# Patient Record
Sex: FEMALE | Race: WHITE | NOT HISPANIC OR LATINO | Employment: OTHER | URBAN - METROPOLITAN AREA
[De-identification: names, ages, dates, MRNs, and addresses within clinical notes are randomized per-mention and may not be internally consistent; named-entity substitution may affect disease eponyms.]

---

## 2019-01-09 DIAGNOSIS — Z00.6 EXAMINATION FOR NORMAL COMPARISON FOR CLINICAL RESEARCH: Primary | ICD-10-CM

## 2019-01-18 ENCOUNTER — TELEPHONE (OUTPATIENT)
Dept: CARDIAC SURGERY | Facility: CLINIC | Age: 59
End: 2019-01-18

## 2019-01-18 NOTE — TELEPHONE ENCOUNTER
New patient scheduled to see Dr. Quinn on 1/10/19 for carotid stenosis. Patient no showed to this appointment. Patient records were placed on Dr. Quinn's desk for review. He stated that he would like to see the patient back on 2/4/19 at his [place] clinic. However, patient has transportation issues and wishes to see Dr. Quinn when he is at his [place] clinic. Appointment made for [place] on 3/14/19.

## 2019-03-14 ENCOUNTER — OFFICE VISIT (OUTPATIENT)
Dept: CARDIAC SURGERY | Facility: CLINIC | Age: 59
End: 2019-03-14

## 2019-03-14 VITALS
WEIGHT: 115 LBS | HEIGHT: 63 IN | BODY MASS INDEX: 20.38 KG/M2 | OXYGEN SATURATION: 99 % | SYSTOLIC BLOOD PRESSURE: 174 MMHG | HEART RATE: 104 BPM | DIASTOLIC BLOOD PRESSURE: 85 MMHG

## 2019-03-14 DIAGNOSIS — I65.22 STENOSIS OF LEFT CAROTID ARTERY: Primary | ICD-10-CM

## 2019-03-14 PROCEDURE — 99205 OFFICE O/P NEW HI 60 MIN: CPT | Performed by: THORACIC SURGERY (CARDIOTHORACIC VASCULAR SURGERY)

## 2019-03-14 RX ORDER — AMLODIPINE BESYLATE 10 MG/1
10 TABLET ORAL EVERY MORNING
Refills: 3 | COMMUNITY
Start: 2019-01-31

## 2019-03-14 RX ORDER — ISOSORBIDE MONONITRATE 30 MG/1
30 TABLET, EXTENDED RELEASE ORAL DAILY
COMMUNITY
End: 2019-04-01

## 2019-03-14 RX ORDER — CLOPIDOGREL BISULFATE 75 MG/1
75 TABLET ORAL EVERY MORNING
Refills: 3 | Status: ON HOLD | COMMUNITY
Start: 2019-01-31 | End: 2022-05-23

## 2019-03-14 RX ORDER — ASPIRIN 81 MG/1
81 TABLET ORAL DAILY
Refills: 3 | COMMUNITY
Start: 2019-02-25

## 2019-03-14 RX ORDER — GABAPENTIN 300 MG/1
300 CAPSULE ORAL 2 TIMES DAILY
Refills: 3 | COMMUNITY
Start: 2019-02-15 | End: 2022-03-03

## 2019-03-14 RX ORDER — ROSUVASTATIN CALCIUM 20 MG/1
20 TABLET, COATED ORAL EVERY MORNING
Refills: 3 | COMMUNITY
Start: 2019-02-25

## 2019-03-14 RX ORDER — FAMOTIDINE 20 MG/1
20 TABLET, FILM COATED ORAL DAILY
Refills: 2 | COMMUNITY
Start: 2019-02-27 | End: 2022-03-03

## 2019-03-14 NOTE — PROGRESS NOTES
"03/14/2019  Patient Information  Shannon Day                                                                                          [address]   [place] [address]   1960  'PCP/Referring Physician'  Provider, No Known  None  No ref. provider found    Chief Complaint   Patient presents with   • Consult     Np referred for carotid artery stenosis.   • Carotid Artery Disease       History of Present Illness:  This is a patient who has a long smoking history beginning at age 12 years.  She has smoked up to two packs a day and continues to smoke.  She has had what she states is a \"previous heart attack\" and she says that she has had a heart catheterization in the last two yeas and she was actually referred to me by her cardiologist with a CTA demonstrating 75% left-sided carotid stenosis.  These are also very small arteries visualized on the scan.  She denies strokes, seizures, TIAs, amaurosis fugax, dysarthria or dysphagia.      Patient Active Problem List   Diagnosis   • Stenosis of left carotid artery     Past Medical History:   Diagnosis Date   • Anxiety    • Arthritis    • Coronary artery disease    • Depression    • Dyslipidemia    • GERD (gastroesophageal reflux disease)    • Hypertension    • Myocardial infarction (CMS/HCC)      Past Surgical History:   Procedure Laterality Date   • CORONARY STENT PLACEMENT         Current Outpatient Medications:   •  amLODIPine (NORVASC) 10 MG tablet, Take 10 mg by mouth Daily., Disp: , Rfl: 3  •  aspirin 81 MG EC tablet, Take 81 mg by mouth Daily., Disp: , Rfl: 3  •  clopidogrel (PLAVIX) 75 MG tablet, Take 75 mg by mouth Daily., Disp: , Rfl: 3  •  famotidine (PEPCID) 20 MG tablet, Take 20 mg by mouth Daily., Disp: , Rfl: 2  •  gabapentin (NEURONTIN) 300 MG capsule, Take 300 mg by mouth 2 (Two) Times a Day., Disp: , Rfl: 3  •  isosorbide mononitrate (IMDUR) 30 MG 24 hr tablet, Take 30 mg by mouth Daily., Disp: , Rfl:   •  rosuvastatin (CRESTOR) 20 MG tablet, " Take 20 mg by mouth Daily., Disp: , Rfl: 3  Allergies   Allergen Reactions   • Percocet [Oxycodone-Acetaminophen] Nausea And Vomiting     Social History     Socioeconomic History   • Marital status: Single     Spouse name: Not on file   • Number of children: 0   • Years of education: Not on file   • Highest education level: Not on file   Social Needs   • Financial resource strain: Not on file   • Food insecurity - worry: Not on file   • Food insecurity - inability: Not on file   • Transportation needs - medical: Not on file   • Transportation needs - non-medical: Not on file   Occupational History   • Occupation: unemployed      Comment: applied for disability   Tobacco Use   • Smoking status: Current Every Day Smoker     Packs/day: 1.00     Years: 46.00     Pack years: 46.00     Types: Cigarettes   • Smokeless tobacco: Never Used   • Tobacco comment: started smoking at age 12   Substance and Sexual Activity   • Alcohol use: Yes     Comment: 1-2 beers daily   • Drug use: No   • Sexual activity: Not on file   Other Topics Concern   • Not on file   Social History Narrative    Lives in [facility]     Family History   Problem Relation Age of Onset   • Other Mother         brain tumor   • Alcohol abuse Father    • Cirrhosis Father      Review of Systems   Constitution: Positive for diaphoresis. Negative for chills, fever, malaise/fatigue, night sweats and weight loss.   HENT: Positive for nosebleeds. Negative for hearing loss, odynophagia and sore throat.    Cardiovascular: Negative for chest pain, dyspnea on exertion, leg swelling, orthopnea and palpitations.   Respiratory: Positive for cough. Negative for hemoptysis.    Endocrine: Negative for cold intolerance, heat intolerance, polydipsia, polyphagia and polyuria.   Hematologic/Lymphatic: Bruises/bleeds easily.   Skin: Negative for itching and rash.   Musculoskeletal: Positive for back pain and falls. Negative for joint pain, joint swelling and  "myalgias.   Gastrointestinal: Negative for abdominal pain, constipation, diarrhea, hematemesis, hematochezia, melena, nausea and vomiting.   Genitourinary: Negative for dysuria, frequency and hematuria.   Neurological: Positive for loss of balance. Negative for focal weakness, headaches, numbness and seizures.   Psychiatric/Behavioral: Positive for depression. Negative for suicidal ideas. The patient is nervous/anxious.    All other systems reviewed and are negative.    Vitals:    03/14/19 1303   BP: 174/85   BP Location: Left arm   Patient Position: Sitting   Pulse: 104   SpO2: 99%   Weight: 52.2 kg (115 lb)   Height: 160 cm (63\")      Physical Exam  CONSTITUTIONAL:  Alert and conversant, in no acute distress.  EYES:    Eyes anicteric.  EOMI.  PERRLA.  ENT:    No nasal deviation.  Trachea is midline.  NECK:    No masses or JVD.  LUNGS:   Decreased in the bases; no wheezing, rales or rhonchi.  CARDIAC:        Regular rate and rhythm without murmur or rub.  No carotid bruits.  GI:      The abdomen is soft, nontender, nondistended with normal bowel sounds.  No hepatosplenomegaly and no masses.  EXTREMITIES:    No cyanosis, clubbing or edema.  SKIN:    No ulcers.  NEURO:   No focal motor or sensory deficits.  PSYCH:   Alert and oriented x3; mood and affect good.    Labs/Imaging:  I have reviewed the CT scan demonstrating 75% left carotid stenosis and very small arteries.    Assessment/Plan:  This patient has a very long smoking history.  She was referred by her cardiologist for a 75% left carotid stenosis.  I have recommended carotid endarterectomy.  I have discussed the risks of stroke, bleeding, infection, death, renal failure and also explained the preoperative testing process.  She agrees to proceed.  She has no telephone and no car for transportation.  This will make scheduling arrangements problematic.  She will phone our office within the next week to try to arrange a time for surgery and the necessary preop " testing.  We will proceed with left carotid endarterectomy.    Patient Active Problem List   Diagnosis   • Stenosis of left carotid artery     CC: MD Vciky Navarro transcribing on behalf of Dallin Quinn MD

## 2019-03-27 ENCOUNTER — PREP FOR SURGERY (OUTPATIENT)
Dept: OTHER | Facility: HOSPITAL | Age: 59
End: 2019-03-27

## 2019-03-27 DIAGNOSIS — I65.22 CAROTID STENOSIS, LEFT: Primary | ICD-10-CM

## 2019-03-27 DIAGNOSIS — I65.22 STENOSIS OF LEFT CAROTID ARTERY: Primary | ICD-10-CM

## 2019-03-27 RX ORDER — CHLORHEXIDINE GLUCONATE 500 MG/1
1 CLOTH TOPICAL EVERY 12 HOURS PRN
Status: CANCELLED | OUTPATIENT
Start: 2019-03-27

## 2019-04-01 ENCOUNTER — APPOINTMENT (OUTPATIENT)
Dept: PREADMISSION TESTING | Facility: HOSPITAL | Age: 59
End: 2019-04-01

## 2019-04-01 ENCOUNTER — ANESTHESIA EVENT (OUTPATIENT)
Dept: PERIOP | Facility: HOSPITAL | Age: 59
End: 2019-04-01

## 2019-04-01 ENCOUNTER — HOSPITAL ENCOUNTER (OUTPATIENT)
Dept: GENERAL RADIOLOGY | Facility: HOSPITAL | Age: 59
Discharge: HOME | End: 2019-04-01
Admitting: PHYSICIAN ASSISTANT

## 2019-04-01 VITALS — WEIGHT: 116.84 LBS | HEIGHT: 63 IN | BODY MASS INDEX: 20.7 KG/M2

## 2019-04-01 DIAGNOSIS — I65.22 CAROTID STENOSIS, LEFT: ICD-10-CM

## 2019-04-01 LAB
ABO GROUP BLD: NORMAL
ANION GAP SERPL CALCULATED.3IONS-SCNC: 7 MMOL/L (ref 3–11)
BLD GP AB SCN SERPL QL: NEGATIVE
BUN BLD-MCNC: 9 MG/DL (ref 9–23)
BUN/CREAT SERPL: 12.2 (ref 7–25)
CALCIUM SPEC-SCNC: 10.2 MG/DL (ref 8.7–10.4)
CHLORIDE SERPL-SCNC: 103 MMOL/L (ref 99–109)
CO2 SERPL-SCNC: 24 MMOL/L (ref 20–31)
CREAT BLD-MCNC: 0.74 MG/DL (ref 0.6–1.3)
DEPRECATED RDW RBC AUTO: 45.1 FL (ref 37–54)
ERYTHROCYTE [DISTWIDTH] IN BLOOD BY AUTOMATED COUNT: 13.3 % (ref 11.3–14.5)
GFR SERPL CREATININE-BSD FRML MDRD: 81 ML/MIN/1.73
GLUCOSE BLD-MCNC: 98 MG/DL (ref 70–100)
HBA1C MFR BLD: 5.2 % (ref 4.8–5.6)
HCT VFR BLD AUTO: 42.6 % (ref 34.5–44)
HGB BLD-MCNC: 14 G/DL (ref 11.5–15.5)
INR PPP: 0.95 (ref 0.85–1.16)
MCH RBC QN AUTO: 30.6 PG (ref 27–31)
MCHC RBC AUTO-ENTMCNC: 32.9 G/DL (ref 32–36)
MCV RBC AUTO: 93 FL (ref 80–99)
PLATELET # BLD AUTO: 292 10*3/MM3 (ref 150–450)
PMV BLD AUTO: 10.1 FL (ref 6–12)
POTASSIUM BLD-SCNC: 4.1 MMOL/L (ref 3.5–5.5)
PROTHROMBIN TIME: 12.2 SECONDS (ref 11.2–14.3)
RBC # BLD AUTO: 4.58 10*6/MM3 (ref 3.89–5.14)
RH BLD: POSITIVE
SODIUM BLD-SCNC: 134 MMOL/L (ref 132–146)
T&S EXPIRATION DATE: NORMAL
WBC NRBC COR # BLD: 11.37 10*3/MM3 (ref 3.5–10.8)

## 2019-04-01 PROCEDURE — 80048 BASIC METABOLIC PNL TOTAL CA: CPT | Performed by: PHYSICIAN ASSISTANT

## 2019-04-01 PROCEDURE — 83036 HEMOGLOBIN GLYCOSYLATED A1C: CPT | Performed by: PHYSICIAN ASSISTANT

## 2019-04-01 PROCEDURE — 71046 X-RAY EXAM CHEST 2 VIEWS: CPT

## 2019-04-01 PROCEDURE — 93005 ELECTROCARDIOGRAM TRACING: CPT

## 2019-04-01 PROCEDURE — 86901 BLOOD TYPING SEROLOGIC RH(D): CPT | Performed by: PHYSICIAN ASSISTANT

## 2019-04-01 PROCEDURE — 86850 RBC ANTIBODY SCREEN: CPT | Performed by: PHYSICIAN ASSISTANT

## 2019-04-01 PROCEDURE — 36415 COLL VENOUS BLD VENIPUNCTURE: CPT

## 2019-04-01 PROCEDURE — 86900 BLOOD TYPING SEROLOGIC ABO: CPT | Performed by: PHYSICIAN ASSISTANT

## 2019-04-01 PROCEDURE — 85027 COMPLETE CBC AUTOMATED: CPT | Performed by: PHYSICIAN ASSISTANT

## 2019-04-01 PROCEDURE — 85610 PROTHROMBIN TIME: CPT | Performed by: PHYSICIAN ASSISTANT

## 2019-04-01 RX ORDER — FAMOTIDINE 10 MG/ML
20 INJECTION, SOLUTION INTRAVENOUS ONCE
Status: CANCELLED | OUTPATIENT
Start: 2019-04-01 | End: 2019-04-01

## 2019-04-01 NOTE — PAT
EKG IN CHART-PATIENT SEES DR. DELAROSA IN [place].      Patient to apply Chlorhexadine wipes  to surgical area (as instructed) the night before procedure and the AM of procedure. Wipes provided.    Per Anesthesia Request, patient instructed not to take their ACE/ARB medications on the AM of surgery.

## 2019-04-02 ENCOUNTER — APPOINTMENT (OUTPATIENT)
Dept: NEUROLOGY | Facility: HOSPITAL | Age: 59
End: 2019-04-02

## 2019-04-02 ENCOUNTER — ANESTHESIA (OUTPATIENT)
Dept: PERIOP | Facility: HOSPITAL | Age: 59
End: 2019-04-02

## 2019-04-02 ENCOUNTER — HOSPITAL ENCOUNTER (INPATIENT)
Facility: HOSPITAL | Age: 59
LOS: 2 days | Discharge: HOME | End: 2019-04-04
Attending: THORACIC SURGERY (CARDIOTHORACIC VASCULAR SURGERY) | Admitting: THORACIC SURGERY (CARDIOTHORACIC VASCULAR SURGERY)

## 2019-04-02 DIAGNOSIS — I65.22 STENOSIS OF LEFT CAROTID ARTERY: ICD-10-CM

## 2019-04-02 DIAGNOSIS — I65.22 CAROTID STENOSIS, LEFT: ICD-10-CM

## 2019-04-02 PROBLEM — I10 ESSENTIAL HYPERTENSION: Status: ACTIVE | Noted: 2019-04-02

## 2019-04-02 PROBLEM — E78.5 DYSLIPIDEMIA: Status: ACTIVE | Noted: 2019-04-02

## 2019-04-02 PROBLEM — J41.1 MUCOPURULENT CHRONIC BRONCHITIS (HCC): Status: ACTIVE | Noted: 2019-04-02

## 2019-04-02 PROBLEM — I25.10 CORONARY ARTERY DISEASE INVOLVING NATIVE CORONARY ARTERY OF NATIVE HEART WITHOUT ANGINA PECTORIS: Status: ACTIVE | Noted: 2019-04-02

## 2019-04-02 LAB
ABO GROUP BLD: NORMAL
RH BLD: POSITIVE

## 2019-04-02 PROCEDURE — 86900 BLOOD TYPING SEROLOGIC ABO: CPT

## 2019-04-02 PROCEDURE — 25010000002 PROPOFOL 10 MG/ML EMULSION: Performed by: NURSE ANESTHETIST, CERTIFIED REGISTERED

## 2019-04-02 PROCEDURE — 88304 TISSUE EXAM BY PATHOLOGIST: CPT | Performed by: THORACIC SURGERY (CARDIOTHORACIC VASCULAR SURGERY)

## 2019-04-02 PROCEDURE — 03UL0KZ SUPPLEMENT LEFT INTERNAL CAROTID ARTERY WITH NONAUTOLOGOUS TISSUE SUBSTITUTE, OPEN APPROACH: ICD-10-PCS | Performed by: THORACIC SURGERY (CARDIOTHORACIC VASCULAR SURGERY)

## 2019-04-02 PROCEDURE — C1768 GRAFT, VASCULAR: HCPCS | Performed by: THORACIC SURGERY (CARDIOTHORACIC VASCULAR SURGERY)

## 2019-04-02 PROCEDURE — 03CL0ZZ EXTIRPATION OF MATTER FROM LEFT INTERNAL CAROTID ARTERY, OPEN APPROACH: ICD-10-PCS | Performed by: THORACIC SURGERY (CARDIOTHORACIC VASCULAR SURGERY)

## 2019-04-02 PROCEDURE — 25010000002 PHENYLEPHRINE PER 1 ML: Performed by: NURSE ANESTHETIST, CERTIFIED REGISTERED

## 2019-04-02 PROCEDURE — 25010000002 HEPARIN (PORCINE) PER 1000 UNITS: Performed by: THORACIC SURGERY (CARDIOTHORACIC VASCULAR SURGERY)

## 2019-04-02 PROCEDURE — 25010000002 DEXAMETHASONE PER 1 MG: Performed by: NURSE ANESTHETIST, CERTIFIED REGISTERED

## 2019-04-02 PROCEDURE — 25010000002 CEFAZOLIN PER 500 MG: Performed by: THORACIC SURGERY (CARDIOTHORACIC VASCULAR SURGERY)

## 2019-04-02 PROCEDURE — 94799 UNLISTED PULMONARY SVC/PX: CPT

## 2019-04-02 PROCEDURE — 88311 DECALCIFY TISSUE: CPT | Performed by: THORACIC SURGERY (CARDIOTHORACIC VASCULAR SURGERY)

## 2019-04-02 PROCEDURE — 25010000002 PROTAMINE SULFATE PER 10 MG: Performed by: NURSE ANESTHETIST, CERTIFIED REGISTERED

## 2019-04-02 PROCEDURE — 94640 AIRWAY INHALATION TREATMENT: CPT

## 2019-04-02 PROCEDURE — 95955 EEG DURING SURGERY: CPT

## 2019-04-02 PROCEDURE — 25010000002 HEPARIN (PORCINE) PER 1000 UNITS: Performed by: NURSE ANESTHETIST, CERTIFIED REGISTERED

## 2019-04-02 PROCEDURE — 25010000002 FENTANYL CITRATE (PF) 100 MCG/2ML SOLUTION: Performed by: NURSE ANESTHETIST, CERTIFIED REGISTERED

## 2019-04-02 PROCEDURE — 25010000002 ONDANSETRON PER 1 MG: Performed by: NURSE ANESTHETIST, CERTIFIED REGISTERED

## 2019-04-02 PROCEDURE — 25010000003 CEFAZOLIN IN DEXTROSE 2-4 GM/100ML-% SOLUTION: Performed by: THORACIC SURGERY (CARDIOTHORACIC VASCULAR SURGERY)

## 2019-04-02 PROCEDURE — 86901 BLOOD TYPING SEROLOGIC RH(D): CPT

## 2019-04-02 PROCEDURE — 25010000002 NEOSTIGMINE 10 MG/10ML SOLUTION: Performed by: NURSE ANESTHETIST, CERTIFIED REGISTERED

## 2019-04-02 PROCEDURE — 99233 SBSQ HOSP IP/OBS HIGH 50: CPT | Performed by: INTERNAL MEDICINE

## 2019-04-02 PROCEDURE — 95816 EEG AWAKE AND DROWSY: CPT

## 2019-04-02 PROCEDURE — 35301 RECHANNELING OF ARTERY: CPT | Performed by: THORACIC SURGERY (CARDIOTHORACIC VASCULAR SURGERY)

## 2019-04-02 DEVICE — VASCU-GUARD PERIPHERAL VASCULAR PATCH IS PREPARED FROM BOVINE PERICARDIUM WHICH IS CROSS-LINKED WITH GLUTARALDEHYDE. THE PERICARDIUM IS PROCURED FROM CATTLE ORIGINATING IN THE UNITED STATES. VASCU-GUARD PERIPHERAL VASCULAR PATCH IS CHEMICALLY STERILIZED USING ETHANOL AND PROPYLENE OXIDE. VASCU-GUARD PERIPHERAL VASCULAR PATCH HAS BEEN TREATED WITH 1 MOLAR SODIUM HYDROXIDE FOR A MINIMUM OF 60 MINUTES AT 20 - 25 C.  VASCU-GUARD PERIPHERAL VASCULAR PATCH IS PACKAGED IN A CONTAINER FILLED WITH STERILE, NON-PYROGENIC WATER CONTAINING PROPYLENE OXIDE. THE CONTENTS OF THE UNOPENED, UNDAMAGED CONTAINER ARE STERILE.
Type: IMPLANTABLE DEVICE | Site: CAROTID | Status: FUNCTIONAL
Brand: VASCU-GUARD PERIPHERAL VASCULAR PATCH

## 2019-04-02 RX ORDER — AMLODIPINE BESYLATE 10 MG/1
10 TABLET ORAL DAILY
Status: DISCONTINUED | OUTPATIENT
Start: 2019-04-03 | End: 2019-04-04 | Stop reason: HOSPADM

## 2019-04-02 RX ORDER — SODIUM CHLORIDE 0.9 % (FLUSH) 0.9 %
3-10 SYRINGE (ML) INJECTION AS NEEDED
Status: DISCONTINUED | OUTPATIENT
Start: 2019-04-02 | End: 2019-04-02 | Stop reason: HOSPADM

## 2019-04-02 RX ORDER — FENTANYL CITRATE 50 UG/ML
50 INJECTION, SOLUTION INTRAMUSCULAR; INTRAVENOUS
Status: DISCONTINUED | OUTPATIENT
Start: 2019-04-02 | End: 2019-04-02 | Stop reason: HOSPADM

## 2019-04-02 RX ORDER — CEFAZOLIN SODIUM 2 G/100ML
2 INJECTION, SOLUTION INTRAVENOUS EVERY 8 HOURS
Status: COMPLETED | OUTPATIENT
Start: 2019-04-02 | End: 2019-04-03

## 2019-04-02 RX ORDER — NEOSTIGMINE METHYLSULFATE 1 MG/ML
INJECTION, SOLUTION INTRAVENOUS AS NEEDED
Status: DISCONTINUED | OUTPATIENT
Start: 2019-04-02 | End: 2019-04-02 | Stop reason: SURG

## 2019-04-02 RX ORDER — GABAPENTIN 300 MG/1
300 CAPSULE ORAL EVERY 12 HOURS SCHEDULED
Status: DISCONTINUED | OUTPATIENT
Start: 2019-04-02 | End: 2019-04-04 | Stop reason: HOSPADM

## 2019-04-02 RX ORDER — MAGNESIUM HYDROXIDE 1200 MG/15ML
LIQUID ORAL AS NEEDED
Status: DISCONTINUED | OUTPATIENT
Start: 2019-04-02 | End: 2019-04-02 | Stop reason: HOSPADM

## 2019-04-02 RX ORDER — GLYCOPYRROLATE 0.2 MG/ML
INJECTION INTRAMUSCULAR; INTRAVENOUS AS NEEDED
Status: DISCONTINUED | OUTPATIENT
Start: 2019-04-02 | End: 2019-04-02 | Stop reason: SURG

## 2019-04-02 RX ORDER — PROMETHAZINE HYDROCHLORIDE 25 MG/ML
6.25 INJECTION, SOLUTION INTRAMUSCULAR; INTRAVENOUS ONCE AS NEEDED
Status: DISCONTINUED | OUTPATIENT
Start: 2019-04-02 | End: 2019-04-02 | Stop reason: HOSPADM

## 2019-04-02 RX ORDER — BUDESONIDE AND FORMOTEROL FUMARATE DIHYDRATE 160; 4.5 UG/1; UG/1
2 AEROSOL RESPIRATORY (INHALATION)
Status: DISCONTINUED | OUTPATIENT
Start: 2019-04-02 | End: 2019-04-04 | Stop reason: HOSPADM

## 2019-04-02 RX ORDER — ONDANSETRON 2 MG/ML
4 INJECTION INTRAMUSCULAR; INTRAVENOUS ONCE AS NEEDED
Status: DISCONTINUED | OUTPATIENT
Start: 2019-04-02 | End: 2019-04-02 | Stop reason: HOSPADM

## 2019-04-02 RX ORDER — PROPOFOL 10 MG/ML
VIAL (ML) INTRAVENOUS AS NEEDED
Status: DISCONTINUED | OUTPATIENT
Start: 2019-04-02 | End: 2019-04-02 | Stop reason: SURG

## 2019-04-02 RX ORDER — MORPHINE SULFATE 4 MG/ML
2 INJECTION, SOLUTION INTRAMUSCULAR; INTRAVENOUS EVERY 4 HOURS PRN
Status: DISCONTINUED | OUTPATIENT
Start: 2019-04-02 | End: 2019-04-04 | Stop reason: HOSPADM

## 2019-04-02 RX ORDER — ESMOLOL HYDROCHLORIDE 10 MG/ML
INJECTION INTRAVENOUS AS NEEDED
Status: DISCONTINUED | OUTPATIENT
Start: 2019-04-02 | End: 2019-04-02 | Stop reason: SURG

## 2019-04-02 RX ORDER — HEPARIN SODIUM 1000 [USP'U]/ML
INJECTION, SOLUTION INTRAVENOUS; SUBCUTANEOUS AS NEEDED
Status: DISCONTINUED | OUTPATIENT
Start: 2019-04-02 | End: 2019-04-02 | Stop reason: SURG

## 2019-04-02 RX ORDER — FAMOTIDINE 20 MG/1
20 TABLET, FILM COATED ORAL 2 TIMES DAILY
Status: DISCONTINUED | OUTPATIENT
Start: 2019-04-02 | End: 2019-04-04 | Stop reason: HOSPADM

## 2019-04-02 RX ORDER — LIDOCAINE HYDROCHLORIDE 10 MG/ML
INJECTION, SOLUTION EPIDURAL; INFILTRATION; INTRACAUDAL; PERINEURAL AS NEEDED
Status: DISCONTINUED | OUTPATIENT
Start: 2019-04-02 | End: 2019-04-02 | Stop reason: SURG

## 2019-04-02 RX ORDER — CEFAZOLIN SODIUM 2 G/100ML
2 INJECTION, SOLUTION INTRAVENOUS ONCE
Status: COMPLETED | OUTPATIENT
Start: 2019-04-02 | End: 2019-04-02

## 2019-04-02 RX ORDER — HYDRALAZINE HYDROCHLORIDE 20 MG/ML
5 INJECTION INTRAMUSCULAR; INTRAVENOUS
Status: DISCONTINUED | OUTPATIENT
Start: 2019-04-02 | End: 2019-04-02 | Stop reason: HOSPADM

## 2019-04-02 RX ORDER — SODIUM CHLORIDE 0.9 % (FLUSH) 0.9 %
3 SYRINGE (ML) INJECTION EVERY 12 HOURS SCHEDULED
Status: DISCONTINUED | OUTPATIENT
Start: 2019-04-02 | End: 2019-04-04 | Stop reason: HOSPADM

## 2019-04-02 RX ORDER — CLOPIDOGREL BISULFATE 75 MG/1
75 TABLET ORAL DAILY
Status: DISCONTINUED | OUTPATIENT
Start: 2019-04-03 | End: 2019-04-04 | Stop reason: HOSPADM

## 2019-04-02 RX ORDER — SODIUM CHLORIDE 0.9 % (FLUSH) 0.9 %
1-10 SYRINGE (ML) INJECTION AS NEEDED
Status: DISCONTINUED | OUTPATIENT
Start: 2019-04-02 | End: 2019-04-02 | Stop reason: HOSPADM

## 2019-04-02 RX ORDER — SODIUM CHLORIDE 0.9 % (FLUSH) 0.9 %
3 SYRINGE (ML) INJECTION EVERY 12 HOURS SCHEDULED
Status: DISCONTINUED | OUTPATIENT
Start: 2019-04-02 | End: 2019-04-02 | Stop reason: HOSPADM

## 2019-04-02 RX ORDER — NALOXONE HCL 0.4 MG/ML
0.4 VIAL (ML) INJECTION AS NEEDED
Status: DISCONTINUED | OUTPATIENT
Start: 2019-04-02 | End: 2019-04-02 | Stop reason: HOSPADM

## 2019-04-02 RX ORDER — PROTAMINE SULFATE 10 MG/ML
INJECTION, SOLUTION INTRAVENOUS AS NEEDED
Status: DISCONTINUED | OUTPATIENT
Start: 2019-04-02 | End: 2019-04-02 | Stop reason: SURG

## 2019-04-02 RX ORDER — FAMOTIDINE 20 MG/1
20 TABLET, FILM COATED ORAL DAILY
Status: DISCONTINUED | OUTPATIENT
Start: 2019-04-03 | End: 2019-04-02

## 2019-04-02 RX ORDER — IPRATROPIUM BROMIDE AND ALBUTEROL SULFATE 2.5; .5 MG/3ML; MG/3ML
3 SOLUTION RESPIRATORY (INHALATION) ONCE AS NEEDED
Status: DISCONTINUED | OUTPATIENT
Start: 2019-04-02 | End: 2019-04-02 | Stop reason: HOSPADM

## 2019-04-02 RX ORDER — NICOTINE 21 MG/24HR
1 PATCH, TRANSDERMAL 24 HOURS TRANSDERMAL
Status: DISCONTINUED | OUTPATIENT
Start: 2019-04-02 | End: 2019-04-04 | Stop reason: HOSPADM

## 2019-04-02 RX ORDER — LIDOCAINE HYDROCHLORIDE 10 MG/ML
0.5 INJECTION, SOLUTION EPIDURAL; INFILTRATION; INTRACAUDAL; PERINEURAL ONCE AS NEEDED
Status: COMPLETED | OUTPATIENT
Start: 2019-04-02 | End: 2019-04-02

## 2019-04-02 RX ORDER — SODIUM CHLORIDE, SODIUM LACTATE, POTASSIUM CHLORIDE, CALCIUM CHLORIDE 600; 310; 30; 20 MG/100ML; MG/100ML; MG/100ML; MG/100ML
INJECTION, SOLUTION INTRAVENOUS CONTINUOUS PRN
Status: DISCONTINUED | OUTPATIENT
Start: 2019-04-02 | End: 2019-04-02 | Stop reason: SURG

## 2019-04-02 RX ORDER — CHLORHEXIDINE GLUCONATE 500 MG/1
1 CLOTH TOPICAL EVERY 12 HOURS PRN
Status: DISCONTINUED | OUTPATIENT
Start: 2019-04-02 | End: 2019-04-04 | Stop reason: HOSPADM

## 2019-04-02 RX ORDER — LIDOCAINE HYDROCHLORIDE 10 MG/ML
INJECTION, SOLUTION INFILTRATION; PERINEURAL AS NEEDED
Status: DISCONTINUED | OUTPATIENT
Start: 2019-04-02 | End: 2019-04-02 | Stop reason: HOSPADM

## 2019-04-02 RX ORDER — MEPERIDINE HYDROCHLORIDE 25 MG/ML
12.5 INJECTION INTRAMUSCULAR; INTRAVENOUS; SUBCUTANEOUS
Status: DISCONTINUED | OUTPATIENT
Start: 2019-04-02 | End: 2019-04-02 | Stop reason: HOSPADM

## 2019-04-02 RX ORDER — PROMETHAZINE HYDROCHLORIDE 25 MG/1
25 SUPPOSITORY RECTAL ONCE AS NEEDED
Status: DISCONTINUED | OUTPATIENT
Start: 2019-04-02 | End: 2019-04-02 | Stop reason: HOSPADM

## 2019-04-02 RX ORDER — IPRATROPIUM BROMIDE AND ALBUTEROL SULFATE 2.5; .5 MG/3ML; MG/3ML
3 SOLUTION RESPIRATORY (INHALATION)
Status: DISCONTINUED | OUTPATIENT
Start: 2019-04-02 | End: 2019-04-04 | Stop reason: HOSPADM

## 2019-04-02 RX ORDER — ASPIRIN 81 MG/1
81 TABLET ORAL DAILY
Status: DISCONTINUED | OUTPATIENT
Start: 2019-04-03 | End: 2019-04-04 | Stop reason: HOSPADM

## 2019-04-02 RX ORDER — SODIUM CHLORIDE 0.9 % (FLUSH) 0.9 %
1-10 SYRINGE (ML) INJECTION AS NEEDED
Status: DISCONTINUED | OUTPATIENT
Start: 2019-04-02 | End: 2019-04-04 | Stop reason: HOSPADM

## 2019-04-02 RX ORDER — FENTANYL CITRATE 50 UG/ML
INJECTION, SOLUTION INTRAMUSCULAR; INTRAVENOUS AS NEEDED
Status: DISCONTINUED | OUTPATIENT
Start: 2019-04-02 | End: 2019-04-02 | Stop reason: SURG

## 2019-04-02 RX ORDER — HYDROMORPHONE HYDROCHLORIDE 1 MG/ML
0.5 INJECTION, SOLUTION INTRAMUSCULAR; INTRAVENOUS; SUBCUTANEOUS
Status: DISCONTINUED | OUTPATIENT
Start: 2019-04-02 | End: 2019-04-02 | Stop reason: HOSPADM

## 2019-04-02 RX ORDER — NICARDIPINE HYDROCHLORIDE 2.5 MG/ML
INJECTION INTRAVENOUS AS NEEDED
Status: DISCONTINUED | OUTPATIENT
Start: 2019-04-02 | End: 2019-04-02 | Stop reason: SURG

## 2019-04-02 RX ORDER — FAMOTIDINE 20 MG/1
20 TABLET, FILM COATED ORAL ONCE
Status: DISCONTINUED | OUTPATIENT
Start: 2019-04-02 | End: 2019-04-02 | Stop reason: HOSPADM

## 2019-04-02 RX ORDER — ROSUVASTATIN CALCIUM 20 MG/1
20 TABLET, COATED ORAL DAILY
Status: DISCONTINUED | OUTPATIENT
Start: 2019-04-03 | End: 2019-04-04 | Stop reason: HOSPADM

## 2019-04-02 RX ORDER — HYDROCODONE BITARTRATE AND ACETAMINOPHEN 5; 325 MG/1; MG/1
1 TABLET ORAL EVERY 6 HOURS PRN
Status: DISCONTINUED | OUTPATIENT
Start: 2019-04-02 | End: 2019-04-04 | Stop reason: HOSPADM

## 2019-04-02 RX ORDER — ONDANSETRON 2 MG/ML
INJECTION INTRAMUSCULAR; INTRAVENOUS AS NEEDED
Status: DISCONTINUED | OUTPATIENT
Start: 2019-04-02 | End: 2019-04-02 | Stop reason: SURG

## 2019-04-02 RX ORDER — SODIUM CHLORIDE, SODIUM LACTATE, POTASSIUM CHLORIDE, CALCIUM CHLORIDE 600; 310; 30; 20 MG/100ML; MG/100ML; MG/100ML; MG/100ML
9 INJECTION, SOLUTION INTRAVENOUS CONTINUOUS
Status: DISCONTINUED | OUTPATIENT
Start: 2019-04-02 | End: 2019-04-04 | Stop reason: HOSPADM

## 2019-04-02 RX ORDER — ROCURONIUM BROMIDE 10 MG/ML
INJECTION, SOLUTION INTRAVENOUS AS NEEDED
Status: DISCONTINUED | OUTPATIENT
Start: 2019-04-02 | End: 2019-04-02 | Stop reason: SURG

## 2019-04-02 RX ORDER — HYDROCODONE BITARTRATE AND ACETAMINOPHEN 5; 325 MG/1; MG/1
1 TABLET ORAL ONCE AS NEEDED
Status: DISCONTINUED | OUTPATIENT
Start: 2019-04-02 | End: 2019-04-02 | Stop reason: HOSPADM

## 2019-04-02 RX ORDER — SODIUM CHLORIDE 9 MG/ML
INJECTION, SOLUTION INTRAVENOUS AS NEEDED
Status: DISCONTINUED | OUTPATIENT
Start: 2019-04-02 | End: 2019-04-02 | Stop reason: HOSPADM

## 2019-04-02 RX ORDER — THROMBIN HUMAN AND FIBRINOGEN 2; 5.5 [USP'U]/1; MG/1
PATCH TOPICAL AS NEEDED
Status: DISCONTINUED | OUTPATIENT
Start: 2019-04-02 | End: 2019-04-02 | Stop reason: HOSPADM

## 2019-04-02 RX ORDER — PROMETHAZINE HYDROCHLORIDE 25 MG/1
25 TABLET ORAL ONCE AS NEEDED
Status: DISCONTINUED | OUTPATIENT
Start: 2019-04-02 | End: 2019-04-02 | Stop reason: HOSPADM

## 2019-04-02 RX ORDER — LABETALOL HYDROCHLORIDE 5 MG/ML
5 INJECTION, SOLUTION INTRAVENOUS
Status: DISCONTINUED | OUTPATIENT
Start: 2019-04-02 | End: 2019-04-02 | Stop reason: HOSPADM

## 2019-04-02 RX ORDER — DEXAMETHASONE SODIUM PHOSPHATE 4 MG/ML
INJECTION, SOLUTION INTRA-ARTICULAR; INTRALESIONAL; INTRAMUSCULAR; INTRAVENOUS; SOFT TISSUE AS NEEDED
Status: DISCONTINUED | OUTPATIENT
Start: 2019-04-02 | End: 2019-04-02 | Stop reason: SURG

## 2019-04-02 RX ORDER — SODIUM CHLORIDE 450 MG/100ML
30 INJECTION, SOLUTION INTRAVENOUS CONTINUOUS
Status: DISCONTINUED | OUTPATIENT
Start: 2019-04-02 | End: 2019-04-04 | Stop reason: HOSPADM

## 2019-04-02 RX ADMIN — SODIUM CHLORIDE, POTASSIUM CHLORIDE, SODIUM LACTATE AND CALCIUM CHLORIDE: 600; 310; 30; 20 INJECTION, SOLUTION INTRAVENOUS at 13:40

## 2019-04-02 RX ADMIN — HEPARIN SODIUM 5000 UNITS: 1000 INJECTION, SOLUTION INTRAVENOUS; SUBCUTANEOUS at 14:06

## 2019-04-02 RX ADMIN — PHENYLEPHRINE HYDROCHLORIDE 80 MCG: 10 INJECTION INTRAVENOUS at 14:54

## 2019-04-02 RX ADMIN — ROCURONIUM BROMIDE 40 MG: 10 INJECTION INTRAVENOUS at 13:51

## 2019-04-02 RX ADMIN — BUDESONIDE AND FORMOTEROL FUMARATE DIHYDRATE 2 PUFF: 160; 4.5 AEROSOL RESPIRATORY (INHALATION) at 20:13

## 2019-04-02 RX ADMIN — SODIUM CHLORIDE, POTASSIUM CHLORIDE, SODIUM LACTATE AND CALCIUM CHLORIDE: 600; 310; 30; 20 INJECTION, SOLUTION INTRAVENOUS at 13:41

## 2019-04-02 RX ADMIN — IPRATROPIUM BROMIDE AND ALBUTEROL SULFATE 3 ML: 2.5; .5 SOLUTION RESPIRATORY (INHALATION) at 20:13

## 2019-04-02 RX ADMIN — SODIUM CHLORIDE, POTASSIUM CHLORIDE, SODIUM LACTATE AND CALCIUM CHLORIDE 9 ML/HR: 600; 310; 30; 20 INJECTION, SOLUTION INTRAVENOUS at 11:42

## 2019-04-02 RX ADMIN — PHENYLEPHRINE HYDROCHLORIDE 1 MCG/KG/MIN: 10 INJECTION INTRAVENOUS at 13:59

## 2019-04-02 RX ADMIN — ESMOLOL HYDROCHLORIDE 20 MG: 10 INJECTION INTRAVENOUS at 14:35

## 2019-04-02 RX ADMIN — NICARDIPINE HYDROCHLORIDE 0.05 MG: 25 INJECTION INTRAVENOUS at 14:25

## 2019-04-02 RX ADMIN — PHENYLEPHRINE HYDROCHLORIDE 40 MCG: 10 INJECTION INTRAVENOUS at 14:44

## 2019-04-02 RX ADMIN — NICARDIPINE HYDROCHLORIDE 0.05 MG: 25 INJECTION INTRAVENOUS at 14:35

## 2019-04-02 RX ADMIN — SODIUM CHLORIDE, PRESERVATIVE FREE 3 ML: 5 INJECTION INTRAVENOUS at 20:22

## 2019-04-02 RX ADMIN — NEOSTIGMINE METHYLSULFATE 2.5 MG: 1 INJECTION, SOLUTION INTRAVENOUS at 14:58

## 2019-04-02 RX ADMIN — HYDROCODONE BITARTRATE AND ACETAMINOPHEN 1 TABLET: 5; 325 TABLET ORAL at 20:21

## 2019-04-02 RX ADMIN — PROTAMINE SULFATE 50 MG: 10 INJECTION, SOLUTION INTRAVENOUS at 14:40

## 2019-04-02 RX ADMIN — LIDOCAINE HYDROCHLORIDE 0.5 ML: 10 INJECTION, SOLUTION EPIDURAL; INFILTRATION; INTRACAUDAL; PERINEURAL at 11:42

## 2019-04-02 RX ADMIN — PHENYLEPHRINE HYDROCHLORIDE 40 MCG: 10 INJECTION INTRAVENOUS at 14:49

## 2019-04-02 RX ADMIN — DEXTROSE MONOHYDRATE 2 G: 5 INJECTION, SOLUTION INTRAVENOUS at 20:21

## 2019-04-02 RX ADMIN — SODIUM CHLORIDE 5 MG/HR: 9 INJECTION, SOLUTION INTRAVENOUS at 14:31

## 2019-04-02 RX ADMIN — NICARDIPINE HYDROCHLORIDE 5 MG/HR: 0.1 INJECTION, SOLUTION INTRAVENOUS at 20:22

## 2019-04-02 RX ADMIN — ONDANSETRON 4 MG: 2 INJECTION INTRAMUSCULAR; INTRAVENOUS at 14:23

## 2019-04-02 RX ADMIN — FENTANYL CITRATE 100 MCG: 50 INJECTION, SOLUTION INTRAMUSCULAR; INTRAVENOUS at 13:51

## 2019-04-02 RX ADMIN — SODIUM CHLORIDE 30 ML/HR: 4.5 INJECTION, SOLUTION INTRAVENOUS at 17:09

## 2019-04-02 RX ADMIN — FAMOTIDINE 20 MG: 20 TABLET ORAL at 20:21

## 2019-04-02 RX ADMIN — CEFAZOLIN SODIUM 2 G: 2 INJECTION, SOLUTION INTRAVENOUS at 13:41

## 2019-04-02 RX ADMIN — PHENYLEPHRINE HYDROCHLORIDE 160 MCG: 10 INJECTION INTRAVENOUS at 14:58

## 2019-04-02 RX ADMIN — NICOTINE 1 PATCH: 21 PATCH, EXTENDED RELEASE TRANSDERMAL at 16:32

## 2019-04-02 RX ADMIN — LIDOCAINE HYDROCHLORIDE 50 MG: 10 INJECTION, SOLUTION EPIDURAL; INFILTRATION; INTRACAUDAL; PERINEURAL at 13:51

## 2019-04-02 RX ADMIN — GABAPENTIN 300 MG: 300 CAPSULE ORAL at 20:21

## 2019-04-02 RX ADMIN — NICARDIPINE HYDROCHLORIDE 5 MG/HR: 0.1 INJECTION, SOLUTION INTRAVENOUS at 17:12

## 2019-04-02 RX ADMIN — PROPOFOL 200 MG: 10 INJECTION, EMULSION INTRAVENOUS at 13:51

## 2019-04-02 RX ADMIN — DEXAMETHASONE SODIUM PHOSPHATE 8 MG: 4 INJECTION, SOLUTION INTRAMUSCULAR; INTRAVENOUS at 13:58

## 2019-04-02 RX ADMIN — GLYCOPYRROLATE 0.4 MG: 0.2 INJECTION, SOLUTION INTRAMUSCULAR; INTRAVENOUS at 14:58

## 2019-04-02 NOTE — ANESTHESIA PROCEDURE NOTES
Arterial Line      Patient location during procedure: pre-op   Line placed for hemodynamic monitoring.  Performed By   Anesthesiologist: Sebastien Duran MD  Preanesthetic Checklist  Completed: patient identified, site marked, surgical consent, pre-op evaluation, timeout performed, IV checked, risks and benefits discussed and monitors and equipment checked  Arterial Line Prep   Sterile Tech: cap, gloves and sterile barriers  Prep: ChloraPrep  Patient monitoring: blood pressure monitoring, continuous pulse oximetry and EKG  Arterial Line Procedure   Laterality:right  Location:  radial artery  Catheter size: 20 G   Guidance: ultrasound guided  PROCEDURE NOTE/ULTRASOUND INTERPRETATION.  Using ultrasound guidance the potential vascular sites for insertion of the catheter were visualized to determine the patency of the vessel to be used for vascular access.  After selecting the appropriate site for insertion, the needle was visualized under ultrasound being inserted into the radial artery, followed by ultrasound confirmation of wire and catheter placement. There were no abnormalities seen on ultrasound; an image was taken; and the patient tolerated the procedure with no complications.   Number of attempts: 1  Successful placement: yes          Post Assessment   Dressing Type: line sutured, occlusive dressing applied, secured with tape and wrist guard applied.   Complications no  Circ/Move/Sens Assessment: normal and unchanged.   Patient Tolerance: patient tolerated the procedure well with no apparent complications

## 2019-04-02 NOTE — OP NOTE
Operative Report    Preop Diagnosis: Left internal carotid stenosis      Postoperative Diagnosis: Same      Procedure: Left internal carotid endarterectomy with pericardial patch closure and EEG monitoring        Surgeons: Dallin Quinn MD as surgeon.  Molina Lacey PA-C is the assistant        Indication: Patient with significant left-sided carotid stenosis documented by CT angiogram.  She understood the risk of stroke bleeding infection death and renal failure these risks were explained on multiple occasions and she agreed to proceed        Description: Supine position.  Sterile prep and drape.  Antibiotics given.  Incision made on the left neck anterior to the sternocleidomastoid and through the platysma.  Care was taken to identify not injure the hypoglossal and vagus nerves and heparin was given.  The internal/external and common carotid arteries were clamped and no deleterious EEG changes were encountered.  The artery was opened there was not only a calcific plaque but appeared to be a fresh hemorrhagic plaque.  It was endarterectomized to a smooth surface and the specimen sent to pathology.  A pericardial patch was sutured in place with Prolene brisk back bleeding was noted from the internal carotid the clamps were then sent released in the proper sequential fashion good hemostasis was obtained and the incision closed multiple layers of suture.  Blood loss less than 100 mL sponge and needle count reported as correct      Please note that portions of this note were completed with a voice recognition program. Efforts were made to edit the dictations, but occasionally words are mistranscribed.

## 2019-04-02 NOTE — PLAN OF CARE
Problem: Patient Care Overview  Goal: Plan of Care Review  Outcome: Ongoing (interventions implemented as appropriate)   04/02/19 1752   Coping/Psychosocial   Plan of Care Reviewed With patient;family   Plan of Care Review   Progress improving   OTHER   Outcome Summary Patient arrived from PACU @ 1709. Patient is Alert and oriented and completely nuerologically intact. Right pupil slightly larger than the left at baseline. Patient Vital signs stable. Will continue to monitor.        Problem: Skin Injury Risk (Adult)  Goal: Identify Related Risk Factors and Signs and Symptoms  Outcome: Outcome(s) achieved Date Met: 04/02/19 04/02/19 1752   Skin Injury Risk (Adult)   Related Risk Factors (Skin Injury Risk) critical care admission     Goal: Skin Health and Integrity  Outcome: Ongoing (interventions implemented as appropriate)   04/02/19 1752   Skin Injury Risk (Adult)   Skin Health and Integrity making progress toward outcome       Problem: Fall Risk (Adult)  Goal: Identify Related Risk Factors and Signs and Symptoms  Outcome: Outcome(s) achieved Date Met: 04/02/19 04/02/19 1752   Fall Risk (Adult)   Related Risk Factors (Fall Risk) polypharmacy     Goal: Absence of Fall  Outcome: Ongoing (interventions implemented as appropriate)   04/02/19 1752   Fall Risk (Adult)   Absence of Fall making progress toward outcome       Problem: Carotid Endarterectomy (Adult)  Goal: Signs and Symptoms of Listed Potential Problems Will be Absent, Minimized or Managed (Carotid Endarterectomy)  Outcome: Outcome(s) achieved Date Met: 04/02/19 04/02/19 1752   Goal/Outcome Evaluation   Problems Assessed (Carotid Endarterectomy) all   Problems Present (Carotid Endarterect) situational response     Goal: Anesthesia/Sedation Recovery  Outcome: Ongoing (interventions implemented as appropriate)   04/02/19 1752   Goal/Outcome Evaluation   Anesthesia/Sedation Recovery progressing toward baseline

## 2019-04-02 NOTE — INTERVAL H&P NOTE
"Pre-Op H&P (See Recent Office Note Attached for Full H&P)    Chief complaint: Carotid artery stenosis    Review of Systems:  General ROS:  no fever, chills, rashes, No change since last office visit  Cardiovascular ROS: no chest pain or dyspnea on exertion.  History of CAD s/p stent.  Follows with Dr. Macario and last visit 6 months ago with no new cardiac symptoms  Respiratory ROS: no cough, shortness of breath, or wheezing  Meds:    No current facility-administered medications on file prior to encounter.      Current Outpatient Medications on File Prior to Encounter   Medication Sig Dispense Refill   • amLODIPine (NORVASC) 10 MG tablet Take 10 mg by mouth Daily.  3   • aspirin 81 MG EC tablet Take 81 mg by mouth Daily.  3   • clopidogrel (PLAVIX) 75 MG tablet Take 75 mg by mouth Daily.  3   • famotidine (PEPCID) 20 MG tablet Take 20 mg by mouth Daily.  2   • gabapentin (NEURONTIN) 300 MG capsule Take 300 mg by mouth 2 (Two) Times a Day.  3   • rosuvastatin (CRESTOR) 20 MG tablet Take 20 mg by mouth Daily.  3       Vital Signs:  /79 (BP Location: Right arm, Patient Position: Lying)   Pulse 92   Temp 98 °F (36.7 °C) (Temporal)   Resp 18   Ht 160 cm (63\")   Wt 52.6 kg (116 lb)   SpO2 96%   BMI 20.55 kg/m²     Physical Exam:    CV:  S1S2 regular rate and rhythm, no murmur               Resp:  Clear to auscultation; respirations regular, even and unlabored    Results Review:     Lab Results   Component Value Date    WBC 11.37 (H) 04/01/2019    HGB 14.0 04/01/2019    HCT 42.6 04/01/2019    MCV 93.0 04/01/2019     04/01/2019    GLUCOSE 98 04/01/2019    BUN 9 04/01/2019    CREATININE 0.74 04/01/2019    EGFRIFNONA 81 04/01/2019     04/01/2019    K 4.1 04/01/2019     04/01/2019    CO2 24.0 04/01/2019    CALCIUM 10.2 04/01/2019        I reviewed the patient's new clinical results.    Cancer Staging (if applicable)  Cancer Patient: __ yes _x_no __unknown; If yes, clinical stage T:__ N:__M:__, " stage group or __N/A    Assessment/Plan:    Left carotid stenosis - Left carotid endarterectomy.  I have discussed the risks of stroke, bleeding, infection, death, and renal failure.  She agrees to proceed.  As above.  Patient with critical left-sided carotid stenosis she began smoking at age 12 years.  And is an ongoing smoker she understands that the surgery has with the risk of stroke bleeding infection death.  These risks were explained in the office and then again today for the second time.  She agrees to proceed    Kelle Hogan, APRN  4/2/2019   12:21 PM

## 2019-04-02 NOTE — ANESTHESIA POSTPROCEDURE EVALUATION
Patient: Shannon Chan    Procedure Summary     Date:  04/02/19 Room / Location:   [facility]    Anesthesia Start:  1341 Anesthesia Stop:  1522    Procedure:  CAROTID ENDARTERECTOMY WITH EEG LEFT (Left Neck) Diagnosis:       Stenosis of left carotid artery      (Stenosis of left carotid artery [I65.22])    Surgeon:  Dallin Quinn MD Provider:  Sebastein Duran MD    Anesthesia Type:  general ASA Status:  3          Anesthesia Type: general  Last vitals  BP   132/68   Temp 97.6   Pulse 85   Resp 18   SpO2 97%     Post Anesthesia Care and Evaluation    Patient location during evaluation: PACU  Patient participation: complete - patient participated  Level of consciousness: awake and alert  Pain score: 0  Pain management: adequate  Airway patency: patent  Anesthetic complications: No anesthetic complications  PONV Status: none  Cardiovascular status: hemodynamically stable and acceptable  Respiratory status: nonlabored ventilation, acceptable and nasal cannula  Hydration status: acceptable

## 2019-04-02 NOTE — ANESTHESIA PREPROCEDURE EVALUATION
Anesthesia Evaluation     Patient summary reviewed and Nursing notes reviewed   NPO Solid Status: > 8 hours  NPO Liquid Status: > 4 hours           Airway   Mallampati: I  TM distance: >3 FB  Neck ROM: full  No difficulty expected  Dental      Pulmonary     breath sounds clear to auscultation  Cardiovascular     Rhythm: regular  Rate: normal    (+) hypertension, CAD, PVD, hyperlipidemia,  carotid artery disease left carotid      Neuro/Psych  GI/Hepatic/Renal/Endo    (+)  GERD,      Musculoskeletal     Abdominal    Substance History      OB/GYN          Other   (+) arthritis                     Anesthesia Plan    ASA 3     general   (Allie)  intravenous induction   Anesthetic plan, all risks, benefits, and alternatives have been provided, discussed and informed consent has been obtained with: patient.    Plan discussed with CRNA.

## 2019-04-02 NOTE — PROGRESS NOTES
Critical Care Note     LOS: 0 days   Patient Care Team:  Jaylene Bradshaw MD as PCP - General (Internal Medicine)    Chief Complaint/Reason for visit:      Left carotid stenosis  CAD  Hypertension  Dyslipidemia  Chronic bronchitis      Subjective     Interval History:     58-year-old woman found to have a 75% stenosis of her left internal carotid artery.  Today she underwent carotid endarterectomy without immediate complications.  She is requiring a Cardene drip for high blood pressure.  She denies headache, weakness, numbness, difficulty speaking.  Risk factors include tobacco use, hypertension, dyslipidemia.  She denies diabetes.    History taken from: patient    Review of Systems:    All systems were reviewed and negative except as noted in subjective.  She admits to a chronic cough in the mornings.  She uses 3 inhalers, albuterol, Spiriva, Breo.  She has never been hospitalized for COPD.  She does not require supplemental oxygen.  She had 3 stents placed in 2016 after a heart attack.  She denies angina, orthopnea, edema, palpitations.  She does consume 2-3 beers daily.  She denies hematemesis, tarry stools, nausea or vomiting.  She has a lot of arthritis in her left knee.  She is a previous surgical repair of a fracture.    Medical history, surgical history, social history, family history reviewed    Objective     Intake/Output:    Intake/Output Summary (Last 24 hours) at 4/2/2019 1837  Last data filed at 4/2/2019 1451  Gross per 24 hour   Intake 900 ml   Output --   Net 900 ml       Nutrition:  Diet Regular; Consistent Carbohydrate, Cardiac    Infusions:    lactated ringers 9 mL/hr Last Rate: 9 mL/hr (04/02/19 1142)   niCARdipine 5-15 mg/hr Last Rate: 5 mg/hr (04/02/19 1800)   phenylephrine (SAHRA-SYNEPHRINE) 50 mg/250 (0.2 mg/mL) infusion 0.5-3 mcg/kg/min Last Rate: Stopped (04/02/19 1800)   sodium chloride 30 mL/hr Last Rate: 30 mL/hr (04/02/19 1709)         Telemetry: Sinus rhythm             Vital  "Signs  Blood pressure 112/63, pulse 84, temperature 97.8 °F (36.6 °C), temperature source Axillary, resp. rate 16, height 160 cm (63\"), weight 54.8 kg (120 lb 13 oz), SpO2 92 %.    Physical Exam:  General Appearance:   Thin older woman in no acute distress   Head:   Normocephalic, atraumatic   Eyes:          Conjunctiva pink.  No jaundice.  Pupils equal and reactive to light   Ears:     Throat:  Oral mucosa moist.  Dentures in place.  No exudate   Neck:  Left neck dressing intact with minimal dried blood.   Back:      Lungs:    Symmetric chest expansion without wheeze or rhonchi    Heart:   Regular rate and rhythm, S1, S2 auscultated, no murmur   Abdomen:    Flat, bowel sounds present, nontender   Rectal:   Deferred   Extremities:  No edema or cyanosis.  Cannot fully straighten her left leg. right radial arterial line in place.  Hand with good capillary refill   Pulses:    Skin:  Warm and dry   Lymph nodes:    Neurologic:  Alert and oriented.  Speech fluent.  Face symmetric.  Tongue midline.  No motor drift upper or lower extremities      Results Review:     I reviewed the patient's new clinical results.   Results from last 7 days   Lab Units 04/01/19  1311   SODIUM mmol/L 134   POTASSIUM mmol/L 4.1   CHLORIDE mmol/L 103   CO2 mmol/L 24.0   BUN mg/dL 9   CREATININE mg/dL 0.74   CALCIUM mg/dL 10.2   GLUCOSE mg/dL 98     Results from last 7 days   Lab Units 04/01/19  1311   WBC 10*3/mm3 11.37*   HEMOGLOBIN g/dL 14.0   HEMATOCRIT % 42.6   PLATELETS 10*3/mm3 292         No results found for: BLOODCX  No results found for: URINECX    I reviewed the patient's new imaging including images and reports.       FINDINGS: PA and lateral views of the chest reveal cardiac and  mediastinal silhouettes within normal limits.  Underlying chronic and  emphysematous changes seen within the lung fields bilaterally. No focal  parenchymal opacification present. No pleural effusion or pneumothorax.  Degenerative changes seen within the " spine. Vascular calcification seen  within the thoracic aorta. No pleural effusion or pneumothorax.         IMPRESSION:  Chronic changes seen within the lung fields with no evidence  of acute parenchymal disease.     D:  04/01/2019  All medications reviewed.     [START ON 4/3/2019] amLODIPine 10 mg Oral Daily   [START ON 4/3/2019] aspirin 81 mg Oral Daily   ceFAZolin 2 g Intravenous Q8H   [START ON 4/3/2019] clopidogrel 75 mg Oral Daily   [START ON 4/3/2019] famotidine 20 mg Oral Daily   gabapentin 300 mg Oral Q12H   niCARdipine (CARDENE) infusion 3-15 mg/hr Intravenous Once   nicotine 1 patch Transdermal Q24H   [START ON 4/3/2019] rosuvastatin 20 mg Oral Daily   sodium chloride 3 mL Intravenous Q12H         Assessment/Plan       Stenosis of left carotid artery    Essential hypertension    Coronary artery disease involving native coronary artery of native heart without angina pectoris    Dyslipidemia    Mucopurulent chronic bronchitis (CMS/HCC)    #1 left carotid stenosis status post carotid endarterectomy without significant difficulties.  She is neurologically intact    #2 hypertension currently on Cardene drip.  She takes Norvasc at home    #3 coronary disease with previous stents to the LAD x2, circumflex x1 in 2016.  Currently without angina    #4 chronic bronchitis she takes an inhaled corticosteroid,LABA, anticholinergic and and short acting rescue inhaler at home.  She has chronic mucoid secretions.  She does not require supplemental oxygen.  She states that she has no intention of smoking cessation    #5 dyslipidemia for which she takes Crestor at home    PLAN:  Monitor neurologic exam  Blood pressure control with Cardene and transition to Norvasc  Nebulized bronchodilators, inhaled corticosteroids  Monitor surgical site for bleeding  Restart statin  Restart Plavix when okay with surgery  Initiate proton pump inhibitor  SCDS  May be candidate for outpatient low dose screening CT chest      Nitza BOATENG  MD Aliyah  04/02/19  6:37 PM      Time: 25min  I personally provided care to this critically ill patient as documented above.  Critical care time does not include time spent on separately billed procedures.  Non of my critical care time was concurrent with other critical care providers.

## 2019-04-02 NOTE — ANESTHESIA PROCEDURE NOTES
Airway  Urgency: elective    Airway not difficult    General Information and Staff    Patient location during procedure: OR  CRNA: Mariela Hanks CRNA    Indications and Patient Condition  Indications for airway management: airway protection    Preoxygenated: yes  MILS not maintained throughout  Mask difficulty assessment: 1 - vent by mask    Final Airway Details  Final airway type: endotracheal airway      Successful airway: ETT  Cuffed: yes   Successful intubation technique: direct laryngoscopy  Endotracheal tube insertion site: oral  Blade: Romeo  Blade size: 3  ETT size (mm): 7.0  Cormack-Lehane Classification: grade I - full view of glottis  Placement verified by: chest auscultation and capnometry   Measured from: lips  ETT to lips (cm): 20  Number of attempts at approach: 1    Additional Comments  Negative epigastric sounds, Breath sound equal bilaterally with symmetric chest rise and fall, lips and teeth as pre op

## 2019-04-03 PROBLEM — I65.22 CAROTID STENOSIS, LEFT: Status: ACTIVE | Noted: 2019-04-03

## 2019-04-03 LAB
ANION GAP SERPL CALCULATED.3IONS-SCNC: 6 MMOL/L (ref 3–11)
BASOPHILS # BLD AUTO: 0.01 10*3/MM3 (ref 0–0.2)
BASOPHILS NFR BLD AUTO: 0.1 % (ref 0–1)
BUN BLD-MCNC: 9 MG/DL (ref 9–23)
BUN/CREAT SERPL: 11.4 (ref 7–25)
CALCIUM SPEC-SCNC: 9.2 MG/DL (ref 8.7–10.4)
CHLORIDE SERPL-SCNC: 105 MMOL/L (ref 99–109)
CO2 SERPL-SCNC: 20 MMOL/L (ref 20–31)
CREAT BLD-MCNC: 0.79 MG/DL (ref 0.6–1.3)
DEPRECATED RDW RBC AUTO: 43.3 FL (ref 37–54)
EOSINOPHIL # BLD AUTO: 0 10*3/MM3 (ref 0–0.3)
EOSINOPHIL NFR BLD AUTO: 0 % (ref 0–3)
ERYTHROCYTE [DISTWIDTH] IN BLOOD BY AUTOMATED COUNT: 13.1 % (ref 11.3–14.5)
GFR SERPL CREATININE-BSD FRML MDRD: 75 ML/MIN/1.73
GLUCOSE BLD-MCNC: 220 MG/DL (ref 70–100)
HCT VFR BLD AUTO: 35.4 % (ref 34.5–44)
HGB BLD-MCNC: 11.7 G/DL (ref 11.5–15.5)
IMM GRANULOCYTES # BLD AUTO: 0.04 10*3/MM3 (ref 0–0.05)
IMM GRANULOCYTES NFR BLD AUTO: 0.3 % (ref 0–0.6)
LYMPHOCYTES # BLD AUTO: 1.51 10*3/MM3 (ref 0.6–4.8)
LYMPHOCYTES NFR BLD AUTO: 10.2 % (ref 24–44)
MCH RBC QN AUTO: 30.1 PG (ref 27–31)
MCHC RBC AUTO-ENTMCNC: 33.1 G/DL (ref 32–36)
MCV RBC AUTO: 91 FL (ref 80–99)
MONOCYTES # BLD AUTO: 0.39 10*3/MM3 (ref 0–1)
MONOCYTES NFR BLD AUTO: 2.6 % (ref 0–12)
NEUTROPHILS # BLD AUTO: 12.93 10*3/MM3 (ref 1.5–8.3)
NEUTROPHILS NFR BLD AUTO: 87.1 % (ref 41–71)
PLATELET # BLD AUTO: 279 10*3/MM3 (ref 150–450)
PMV BLD AUTO: 10.7 FL (ref 6–12)
POTASSIUM BLD-SCNC: 3.7 MMOL/L (ref 3.5–5.5)
RBC # BLD AUTO: 3.89 10*6/MM3 (ref 3.89–5.14)
SODIUM BLD-SCNC: 131 MMOL/L (ref 132–146)
WBC NRBC COR # BLD: 14.84 10*3/MM3 (ref 3.5–10.8)

## 2019-04-03 PROCEDURE — 25010000002 CEFAZOLIN PER 500 MG: Performed by: THORACIC SURGERY (CARDIOTHORACIC VASCULAR SURGERY)

## 2019-04-03 PROCEDURE — 99232 SBSQ HOSP IP/OBS MODERATE 35: CPT | Performed by: INTERNAL MEDICINE

## 2019-04-03 PROCEDURE — 94799 UNLISTED PULMONARY SVC/PX: CPT

## 2019-04-03 PROCEDURE — 85025 COMPLETE CBC W/AUTO DIFF WBC: CPT | Performed by: PHYSICIAN ASSISTANT

## 2019-04-03 PROCEDURE — 80048 BASIC METABOLIC PNL TOTAL CA: CPT | Performed by: PHYSICIAN ASSISTANT

## 2019-04-03 PROCEDURE — 99024 POSTOP FOLLOW-UP VISIT: CPT | Performed by: THORACIC SURGERY (CARDIOTHORACIC VASCULAR SURGERY)

## 2019-04-03 RX ADMIN — ROSUVASTATIN CALCIUM 20 MG: 20 TABLET, FILM COATED ORAL at 09:01

## 2019-04-03 RX ADMIN — IPRATROPIUM BROMIDE AND ALBUTEROL SULFATE 3 ML: 2.5; .5 SOLUTION RESPIRATORY (INHALATION) at 11:33

## 2019-04-03 RX ADMIN — DEXTROSE MONOHYDRATE 2 G: 5 INJECTION, SOLUTION INTRAVENOUS at 05:05

## 2019-04-03 RX ADMIN — IPRATROPIUM BROMIDE AND ALBUTEROL SULFATE 3 ML: 2.5; .5 SOLUTION RESPIRATORY (INHALATION) at 07:50

## 2019-04-03 RX ADMIN — ASPIRIN 81 MG: 81 TABLET, COATED ORAL at 09:01

## 2019-04-03 RX ADMIN — HYDROCODONE BITARTRATE AND ACETAMINOPHEN 1 TABLET: 5; 325 TABLET ORAL at 14:17

## 2019-04-03 RX ADMIN — NICOTINE 1 PATCH: 21 PATCH, EXTENDED RELEASE TRANSDERMAL at 09:02

## 2019-04-03 RX ADMIN — HYDROCODONE BITARTRATE AND ACETAMINOPHEN 1 TABLET: 5; 325 TABLET ORAL at 05:08

## 2019-04-03 RX ADMIN — FAMOTIDINE 20 MG: 20 TABLET ORAL at 09:01

## 2019-04-03 RX ADMIN — FAMOTIDINE 20 MG: 20 TABLET ORAL at 20:06

## 2019-04-03 RX ADMIN — IPRATROPIUM BROMIDE AND ALBUTEROL SULFATE 3 ML: 2.5; .5 SOLUTION RESPIRATORY (INHALATION) at 15:55

## 2019-04-03 RX ADMIN — HYDROCODONE BITARTRATE AND ACETAMINOPHEN 1 TABLET: 5; 325 TABLET ORAL at 20:05

## 2019-04-03 RX ADMIN — GABAPENTIN 300 MG: 300 CAPSULE ORAL at 09:01

## 2019-04-03 RX ADMIN — BUDESONIDE AND FORMOTEROL FUMARATE DIHYDRATE 2 PUFF: 160; 4.5 AEROSOL RESPIRATORY (INHALATION) at 07:50

## 2019-04-03 RX ADMIN — CLOPIDOGREL BISULFATE 75 MG: 75 TABLET ORAL at 09:01

## 2019-04-03 RX ADMIN — GABAPENTIN 300 MG: 300 CAPSULE ORAL at 20:05

## 2019-04-03 NOTE — PROGRESS NOTES
Discharge Planning Assessment  [facility]     Patient Name: Shannon Chan  MRN: [id number]  Today's Date: 4/3/2019    Admit Date: 4/2/2019    Discharge Needs Assessment     Row Name 04/03/19 1038       Living Environment    Lives With  alone    Current Living Arrangements  home/apartment/condo    Primary Care Provided by  self    Provides Primary Care For  no one    Family Caregiver if Needed  sibling(s)    Quality of Family Relationships  supportive    Able to Return to Prior Arrangements  yes       Resource/Environmental Concerns    Resource/Environmental Concerns  none    Transportation Concerns  car, none       Transition Planning    Patient/Family Anticipates Transition to  home    Patient/Family Anticipated Services at Transition  none    Transportation Anticipated  family or friend will provide       Discharge Needs Assessment    Readmission Within the Last 30 Days  no previous admission in last 30 days    Concerns to be Addressed  denies needs/concerns at this time    Equipment Currently Used at Home  cane, straight    Anticipated Changes Related to Illness  none    Equipment Needed After Discharge  none        Discharge Plan     Row Name 04/03/19 1039       Plan    Plan  Home    Patient/Family in Agreement with Plan  yes    Plan Comments  Spoke with patient at bedside.  Patient resides in [facility] and lives alone.  Prior to admission patient was independent with ADL's and  mobility using a cane.  Patient has a cane but no other DME and states she has never used home health.  Patient plans to go home at discharge, states she does have help at home and denies any needs at this time.  CM will continue to follow.        Destination      No service coordination in this encounter.      Durable Medical Equipment      No service coordination in this encounter.      Dialysis/Infusion      No service coordination in this encounter.      Home Medical Care      No service coordination in this encounter.       Therapy      No service coordination in this encounter.      Community Resources      No service coordination in this encounter.        Expected Discharge Date and Time     Expected Discharge Date Expected Discharge Time    Apr 5, 2019         Demographic Summary     Row Name 04/03/19 1038       General Information    Admission Type  inpatient    Arrived From  home    Referral Source  admission list    Reason for Consult  discharge planning    Preferred Language  English       Contact Information    Permission Granted to Share Info With          Functional Status    No documentation.       Psychosocial    No documentation.       Abuse/Neglect    No documentation.       Legal    No documentation.       Substance Abuse    No documentation.       Patient Forms    No documentation.           Bridget Esparza RN

## 2019-04-03 NOTE — PLAN OF CARE
Problem: Patient Care Overview  Goal: Plan of Care Review  Outcome: Ongoing (interventions implemented as appropriate)   04/03/19 1900   Coping/Psychosocial   Plan of Care Reviewed With patient   Plan of Care Review   Progress improving   OTHER   Outcome Summary ambulated; up in chair; a-line dc'd       Problem: Skin Injury Risk (Adult)  Goal: Skin Health and Integrity  Outcome: Ongoing (interventions implemented as appropriate)      Problem: Fall Risk (Adult)  Goal: Absence of Fall  Outcome: Ongoing (interventions implemented as appropriate)      Problem: Carotid Endarterectomy (Adult)  Goal: Anesthesia/Sedation Recovery  Outcome: Outcome(s) achieved Date Met: 04/03/19

## 2019-04-03 NOTE — PLAN OF CARE
Problem: Patient Care Overview  Goal: Plan of Care Review  Outcome: Ongoing (interventions implemented as appropriate)   04/03/19 0624   Coping/Psychosocial   Plan of Care Reviewed With patient   Plan of Care Review   Progress improving   OTHER   Outcome Summary pt remains alert and oriented x4. cardene gtt off since 2217, HR has been 60s-90s, SBP 80-100s. sats >92% on room air. UOP of 1050. pain controlled with PRN norco x2.      Goal: Individualization and Mutuality  Outcome: Ongoing (interventions implemented as appropriate)    Goal: Discharge Needs Assessment  Outcome: Ongoing (interventions implemented as appropriate)    Goal: Interprofessional Rounds/Family Conf  Outcome: Ongoing (interventions implemented as appropriate)      Problem: Skin Injury Risk (Adult)  Goal: Skin Health and Integrity  Outcome: Ongoing (interventions implemented as appropriate)      Problem: Fall Risk (Adult)  Goal: Absence of Fall  Outcome: Ongoing (interventions implemented as appropriate)      Problem: Carotid Endarterectomy (Adult)  Goal: Anesthesia/Sedation Recovery  Outcome: Ongoing (interventions implemented as appropriate)

## 2019-04-03 NOTE — CONSULTS
[facility] History & Physical    Referring Provider: Dallin Quinn MD    Patient Care Team:  Jaylene Bradshaw MD as PCP - General (Internal Medicine)    Chief complaint No chief complaint on file.      Subjective .     History of present illness: Pleasant middle-aged white female who underwent carotid endarterectomy yesterday.  She has been doing well from a heart standpoint with no chest pain although she does have shortness of breath that occurs daily made worse by exertion diminished with rest of moderate intensity.  No dizziness or palpitation.  She is followed by Dr. leonarda Melgoza    Review of Systems   Pertinent items are noted in HPI, all other systems reviewed and negative    History  Past Medical History:   Diagnosis Date   • Anxiety    • Arthritis    • Coronary artery disease    • Depression    • Dyslipidemia    • GERD (gastroesophageal reflux disease)    • Hyperlipidemia    • Hypertension    • Myocardial infarction (CMS/HCC)    • Wears dentures     UPPER PLATE    • Wears glasses    , Past Surgical History:   Procedure Laterality Date   • CARDIAC CATHETERIZATION  2016 [place]   • CAROTID ENDARTERECTOMY Left 4/2/2019    Procedure: CAROTID ENDARTERECTOMY WITH EEG LEFT;  Surgeon: Dallin Quinn MD;  Location: [facility];  Service: Vascular   • CORONARY STENT PLACEMENT  2016    LADx2,Circx1 [place]   • KNEE SURGERY Left     fracture repair   • TUBAL ABDOMINAL LIGATION     , Family History   Problem Relation Age of Onset   • Other Mother         brain tumor   • Alcohol abuse Father    • Cirrhosis Father    , Social History     Tobacco Use   • Smoking status: Current Every Day Smoker     Packs/day: 0.50     Years: 46.00     Pack years: 23.00     Types: Cigarettes   • Smokeless tobacco: Never Used   • Tobacco comment: started smoking at age 12   Substance Use Topics   • Alcohol use: Yes     Alcohol/week: 12.6 oz     Types: 21 Cans of beer per week     Frequency: Never   •  "Drug use: No   , Medications Prior to Admission   Medication Sig Dispense Refill Last Dose   • amLODIPine (NORVASC) 10 MG tablet Take 10 mg by mouth Daily.  3 4/2/2019 at 0700   • aspirin 81 MG EC tablet Take 81 mg by mouth Daily.  3 4/2/2019 at 0700   • clopidogrel (PLAVIX) 75 MG tablet Take 75 mg by mouth Daily.  3 4/2/2019 at 0700   • famotidine (PEPCID) 20 MG tablet Take 20 mg by mouth Daily.  2 4/2/2019 at 0700   • gabapentin (NEURONTIN) 300 MG capsule Take 300 mg by mouth 2 (Two) Times a Day.  3 4/2/2019 at 070   • rosuvastatin (CRESTOR) 20 MG tablet Take 20 mg by mouth Daily.  3 4/2/2019 at 070   , Scheduled Meds:    amLODIPine 10 mg Oral Daily   aspirin 81 mg Oral Daily   budesonide-formoterol 2 puff Inhalation BID - RT   clopidogrel 75 mg Oral Daily   famotidine 20 mg Oral BID   gabapentin 300 mg Oral Q12H   ipratropium-albuterol 3 mL Nebulization 4x Daily - RT   niCARdipine (CARDENE) infusion 3-15 mg/hr Intravenous Once   nicotine 1 patch Transdermal Q24H   rosuvastatin 20 mg Oral Daily   sodium chloride 3 mL Intravenous Q12H   , Continuous Infusions:    lactated ringers 9 mL/hr Last Rate: 9 mL/hr (04/02/19 1142)   niCARdipine 5-15 mg/hr Last Rate: Stopped (04/02/19 2217)   phenylephrine (SAHRA-SYNEPHRINE) 50 mg/250 (0.2 mg/mL) infusion 0.5-3 mcg/kg/min Last Rate: Stopped (04/02/19 1800)   sodium chloride 30 mL/hr Last Rate: 30 mL/hr (04/02/19 1709)   , PRN Meds:  Chlorhexidine Gluconate Cloth  •  HYDROcodone-acetaminophen  •  Morphine  •  sodium chloride and Allergies:  Percocet [oxycodone-acetaminophen]    Objective     Vital Sign Min/Max for last 24 hours  Temp  Min: 97.1 °F (36.2 °C)  Max: 98.1 °F (36.7 °C)   BP  Min: 78/46  Max: 138/79   Pulse  Min: 59  Max: 118   Resp  Min: 13  Max: 22   SpO2  Min: 84 %  Max: 99 %   No Data Recorded   Weight  Min: 52.6 kg (116 lb)  Max: 54.8 kg (120 lb 13 oz)     Flowsheet Rows      First Filed Value   Admission Height  160 cm (63\") Documented at 04/02/2019 1121 "   Admission Weight  52.6 kg (116 lb) Documented at 04/02/2019 1121             Ejection Fraction  No results found for: EF    Echo EF Estimated  No results found for: ECHOEFEST    Nuclear Stress Ejection Fraction  No components found for: NUCEF    Cath Ejection Fraction Quantitative  No results found for: CATHEF    Physical Exam:     General Appearance:    Alert, cooperative, in no acute distress   Head:    Normocephalic, without obvious abnormality, atraumatic   Eyes:            Lids and lashes normal, conjunctivae and sclerae normal, no   icterus, no pallor, corneas clear, PERRLA   Ears:    Ears appear intact with no abnormalities noted   Throat:   No oral lesions, no thrush, oral mucosa moist   Neck:   No adenopathy, supple, trachea midline, no thyromegaly, no     carotid bruit, no JVD   Back:     No kyphosis present, no scoliosis present, no skin lesions,       erythema or scars, no tenderness to percussion or                   palpation,   range of motion normal   Lungs:     Clear to auscultation,respirations regular, even and                   unlabored    Heart:    Regular rhythm and normal rate, normal S1 and S2, no            murmur, no gallop, no rub, no click   Breast Exam:    Deferred   Abdomen:     Normal bowel sounds, no masses, no organomegaly, soft        non-tender, non-distended, no guarding, no rebound                 tenderness   Genitalia:    Deferred   Extremities:   Moves all extremities well, no edema, no cyanosis, no              redness   Pulses:   Pulses palpable and equal bilaterally   Skin:   No bleeding, bruising or rash   Lymph nodes:   No palpable adenopathy   Neurologic:   Cranial nerves 2 - 12 grossly intact, sensation intact, DTR        present and equal bilaterally       Results Review:   I reviewed the patient's new clinical results.  Results from last 7 days   Lab Units 04/03/19  0359 04/01/19  1311   WBC 10*3/mm3 14.84* 11.37*   HEMOGLOBIN g/dL 11.7 14.0   HEMATOCRIT % 35.4  42.6   PLATELETS 10*3/mm3 279 292     Results from last 7 days   Lab Units 04/03/19  0359 04/01/19  1311   SODIUM mmol/L 131* 134   POTASSIUM mmol/L 3.7 4.1   CHLORIDE mmol/L 105 103   CO2 mmol/L 20.0 24.0   BUN mg/dL 9 9   CREATININE mg/dL 0.79 0.74   GLUCOSE mg/dL 220* 98   CALCIUM mg/dL 9.2 10.2     No results found for: TROPONINT          Results from last 7 days   Lab Units 04/01/19  1311   INR  0.95           Assessment/Plan       Stenosis of left carotid artery    Essential hypertension    Coronary artery disease involving native coronary artery of native heart without angina pectoris    Dyslipidemia    Mucopurulent chronic bronchitis (CMS/HCC)    Carotid stenosis, left      Stable after carotid endarterectomy, continue supportive care    I discussed the patients findings and my recommendations with patient    Gerald Everett MD  04/03/19  9:32 AM

## 2019-04-03 NOTE — PAYOR COMM NOTE
"Shannon Becerra (58 y.o. Female)     Reena King, RN  [phone]  F [phone]    Initial clinicals on a scheduled inpatient surgery, auth  [id number], please call/fax with how many days have been approved.    Date of Birth Social Security Number Address Home Phone MRN    1960  [address]    [place] [address]  [id number]    [employer] Marital Status          None Single       Admission Date Admission Type Admitting Provider Attending Provider Department, Room/Bed    4/2/19 Elective Dallin Quinn MD Mitchell, Robert O, MD [facility] 2A ICU, N201/1    Discharge Date Discharge Disposition Discharge Destination                       Attending Provider:  Dallin Quinn MD    Allergies:  Percocet [Oxycodone-acetaminophen]    Isolation:  None   Infection:  None   Code Status:  CPR    Ht:  160 cm (63\")   Wt:  54.8 kg (120 lb 13 oz)    Admission Cmt:  None   Principal Problem:  Stenosis of left carotid artery [I65.22]                 Active Insurance as of 4/2/2019     Primary Coverage     Payor Plan Insurance Group Employer/Plan Group    PASSPORT PASSPORT MEDICAID     Payor Plan Address Payor Plan Phone Number Payor Plan Fax Number Effective Dates    [address] [phone]  11/1/1997 - None Entered    [facility] [id number]       Subscriber Name Subscriber Birth Date Member ID       SHANNON BECERRA 1960 [id number]                 Emergency Contacts      (Rel.) Home Phone Work Phone Mobile Phone    DAKOTAH LAWSON (Sister) [phone] -- [phone]               History & Physical      Dallin Quinn MD at 4/2/2019 12:21 PM          Pre-Op H&P (See Recent Office Note Attached for Full H&P)    Chief complaint: Carotid artery stenosis    Review of Systems:  General ROS:  no fever, chills, rashes, No change since last office visit  Cardiovascular ROS: no chest pain or dyspnea on exertion.  History of CAD s/p stent.  Follows with Dr. Macario and " "last visit 6 months ago with no new cardiac symptoms  Respiratory ROS: no cough, shortness of breath, or wheezing  Meds:    No current facility-administered medications on file prior to encounter.      Current Outpatient Medications on File Prior to Encounter   Medication Sig Dispense Refill   • amLODIPine (NORVASC) 10 MG tablet Take 10 mg by mouth Daily.  3   • aspirin 81 MG EC tablet Take 81 mg by mouth Daily.  3   • clopidogrel (PLAVIX) 75 MG tablet Take 75 mg by mouth Daily.  3   • famotidine (PEPCID) 20 MG tablet Take 20 mg by mouth Daily.  2   • gabapentin (NEURONTIN) 300 MG capsule Take 300 mg by mouth 2 (Two) Times a Day.  3   • rosuvastatin (CRESTOR) 20 MG tablet Take 20 mg by mouth Daily.  3       Vital Signs:  /79 (BP Location: Right arm, Patient Position: Lying)   Pulse 92   Temp 98 °F (36.7 °C) (Temporal)   Resp 18   Ht 160 cm (63\")   Wt 52.6 kg (116 lb)   SpO2 96%   BMI 20.55 kg/m²      Physical Exam:    CV:  S1S2 regular rate and rhythm, no murmur               Resp:  Clear to auscultation; respirations regular, even and unlabored    Results Review:     Lab Results   Component Value Date    WBC 11.37 (H) 04/01/2019    HGB 14.0 04/01/2019    HCT 42.6 04/01/2019    MCV 93.0 04/01/2019     04/01/2019    GLUCOSE 98 04/01/2019    BUN 9 04/01/2019    CREATININE 0.74 04/01/2019    EGFRIFNONA 81 04/01/2019     04/01/2019    K 4.1 04/01/2019     04/01/2019    CO2 24.0 04/01/2019    CALCIUM 10.2 04/01/2019        I reviewed the patient's new clinical results.    Cancer Staging (if applicable)  Cancer Patient: __ yes _x_no __unknown; If yes, clinical stage T:__ N:__M:__, stage group or __N/A    Assessment/Plan:    Left carotid stenosis - Left carotid endarterectomy.  I have discussed the risks of stroke, bleeding, infection, death, and renal failure.  She agrees to proceed.  As above.  Patient with critical left-sided carotid stenosis she began smoking at age 12 years.  And is an " "ongoing smoker she understands that the surgery has with the risk of stroke bleeding infection death.  These risks were explained in the office and then again today for the second time.  She agrees to proceed    Kelle Hogan, APRN  4/2/2019   12:21 PM          Electronically signed by Dallin Quinn MD at 4/2/2019  1:43 PM   Source Note             03/14/2019  Patient Information  Shannon Chan                                                                                          [address]   [place] [address]   1960  'PCP/Referring Physician'  Provider, No Known  None  No ref. provider found    Chief Complaint   Patient presents with   • Consult     Np referred for carotid artery stenosis.   • Carotid Artery Disease       History of Present Illness:  This is a patient who has a long smoking history beginning at age 12 years.  She has smoked up to two packs a day and continues to smoke.  She has had what she states is a \"previous heart attack\" and she says that she has had a heart catheterization in the last two yeas and she was actually referred to me by her cardiologist with a CTA demonstrating 75% left-sided carotid stenosis.  These are also very small arteries visualized on the scan.  She denies strokes, seizures, TIAs, amaurosis fugax, dysarthria or dysphagia.      Patient Active Problem List   Diagnosis   • Stenosis of left carotid artery     Past Medical History:   Diagnosis Date   • Anxiety    • Arthritis    • Coronary artery disease    • Depression    • Dyslipidemia    • GERD (gastroesophageal reflux disease)    • Hypertension    • Myocardial infarction (CMS/HCC)      Past Surgical History:   Procedure Laterality Date   • CORONARY STENT PLACEMENT         Current Outpatient Medications:   •  amLODIPine (NORVASC) 10 MG tablet, Take 10 mg by mouth Daily., Disp: , Rfl: 3  •  aspirin 81 MG EC tablet, Take 81 mg by mouth Daily., Disp: , Rfl: 3  •  clopidogrel (PLAVIX) 75 MG tablet, " Take 75 mg by mouth Daily., Disp: , Rfl: 3  •  famotidine (PEPCID) 20 MG tablet, Take 20 mg by mouth Daily., Disp: , Rfl: 2  •  gabapentin (NEURONTIN) 300 MG capsule, Take 300 mg by mouth 2 (Two) Times a Day., Disp: , Rfl: 3  •  isosorbide mononitrate (IMDUR) 30 MG 24 hr tablet, Take 30 mg by mouth Daily., Disp: , Rfl:   •  rosuvastatin (CRESTOR) 20 MG tablet, Take 20 mg by mouth Daily., Disp: , Rfl: 3  Allergies   Allergen Reactions   • Percocet [Oxycodone-Acetaminophen] Nausea And Vomiting     Social History     Socioeconomic History   • Marital status: Single     Spouse name: Not on file   • Number of children: 0   • Years of education: Not on file   • Highest education level: Not on file   Social Needs   • Financial resource strain: Not on file   • Food insecurity - worry: Not on file   • Food insecurity - inability: Not on file   • Transportation needs - medical: Not on file   • Transportation needs - non-medical: Not on file   Occupational History   • Occupation: unemployed      Comment: applied for disability   Tobacco Use   • Smoking status: Current Every Day Smoker     Packs/day: 1.00     Years: 46.00     Pack years: 46.00     Types: Cigarettes   • Smokeless tobacco: Never Used   • Tobacco comment: started smoking at age 12   Substance and Sexual Activity   • Alcohol use: Yes     Comment: 1-2 beers daily   • Drug use: No   • Sexual activity: Not on file   Other Topics Concern   • Not on file   Social History Narrative    Lives in [facility]     Family History   Problem Relation Age of Onset   • Other Mother         brain tumor   • Alcohol abuse Father    • Cirrhosis Father      Review of Systems   Constitution: Positive for diaphoresis. Negative for chills, fever, malaise/fatigue, night sweats and weight loss.   HENT: Positive for nosebleeds. Negative for hearing loss, odynophagia and sore throat.    Cardiovascular: Negative for chest pain, dyspnea on exertion, leg swelling, orthopnea  "and palpitations.   Respiratory: Positive for cough. Negative for hemoptysis.    Endocrine: Negative for cold intolerance, heat intolerance, polydipsia, polyphagia and polyuria.   Hematologic/Lymphatic: Bruises/bleeds easily.   Skin: Negative for itching and rash.   Musculoskeletal: Positive for back pain and falls. Negative for joint pain, joint swelling and myalgias.   Gastrointestinal: Negative for abdominal pain, constipation, diarrhea, hematemesis, hematochezia, melena, nausea and vomiting.   Genitourinary: Negative for dysuria, frequency and hematuria.   Neurological: Positive for loss of balance. Negative for focal weakness, headaches, numbness and seizures.   Psychiatric/Behavioral: Positive for depression. Negative for suicidal ideas. The patient is nervous/anxious.    All other systems reviewed and are negative.    Vitals:    03/14/19 1303   BP: 174/85   BP Location: Left arm   Patient Position: Sitting   Pulse: 104   SpO2: 99%   Weight: 52.2 kg (115 lb)   Height: 160 cm (63\")      Physical Exam  CONSTITUTIONAL:  Alert and conversant, in no acute distress.  EYES:    Eyes anicteric.  EOMI.  PERRLA.  ENT:    No nasal deviation.  Trachea is midline.  NECK:    No masses or JVD.  LUNGS:   Decreased in the bases; no wheezing, rales or rhonchi.  CARDIAC:        Regular rate and rhythm without murmur or rub.  No carotid bruits.  GI:      The abdomen is soft, nontender, nondistended with normal bowel sounds.  No hepatosplenomegaly and no masses.  EXTREMITIES:    No cyanosis, clubbing or edema.  SKIN:    No ulcers.  NEURO:   No focal motor or sensory deficits.  PSYCH:   Alert and oriented x3; mood and affect good.    Labs/Imaging:  I have reviewed the CT scan demonstrating 75% left carotid stenosis and very small arteries.    Assessment/Plan:  This patient has a very long smoking history.  She was referred by her cardiologist for a 75% left carotid stenosis.  I have recommended carotid endarterectomy.  I have " "discussed the risks of stroke, bleeding, infection, death, renal failure and also explained the preoperative testing process.  She agrees to proceed.  She has no telephone and no car for transportation.  This will make scheduling arrangements problematic.  She will phone our office within the next week to try to arrange a time for surgery and the necessary preop testing.  We will proceed with left carotid endarterectomy.    Patient Active Problem List   Diagnosis   • Stenosis of left carotid artery     CC: MD Vicky Navarro transcribing on behalf of Dallin Quinn MD       Electronically signed by Dallin Quinn MD at 3/15/2019  7:40 AM             Dallin Quinn MD at 3/14/2019  8:30 AM          03/14/2019  Patient Information  Shannon Rocio                                                                                          [address] [place] [id number]   1960  'PCP/Referring Physician'  Provider, No Known  None  No ref. provider found    Chief Complaint   Patient presents with   • Consult     Np referred for carotid artery stenosis.   • Carotid Artery Disease       History of Present Illness:  This is a patient who has a long smoking history beginning at age 12 years.  She has smoked up to two packs a day and continues to smoke.  She has had what she states is a \"previous heart attack\" and she says that she has had a heart catheterization in the last two yeas and she was actually referred to me by her cardiologist with a CTA demonstrating 75% left-sided carotid stenosis.  These are also very small arteries visualized on the scan.  She denies strokes, seizures, TIAs, amaurosis fugax, dysarthria or dysphagia.      Patient Active Problem List   Diagnosis   • Stenosis of left carotid artery     Past Medical History:   Diagnosis Date   • Anxiety    • Arthritis    • Coronary artery disease    • Depression    • Dyslipidemia    • GERD (gastroesophageal reflux disease)  "   • Hypertension    • Myocardial infarction (CMS/HCC)      Past Surgical History:   Procedure Laterality Date   • CORONARY STENT PLACEMENT         Current Outpatient Medications:   •  amLODIPine (NORVASC) 10 MG tablet, Take 10 mg by mouth Daily., Disp: , Rfl: 3  •  aspirin 81 MG EC tablet, Take 81 mg by mouth Daily., Disp: , Rfl: 3  •  clopidogrel (PLAVIX) 75 MG tablet, Take 75 mg by mouth Daily., Disp: , Rfl: 3  •  famotidine (PEPCID) 20 MG tablet, Take 20 mg by mouth Daily., Disp: , Rfl: 2  •  gabapentin (NEURONTIN) 300 MG capsule, Take 300 mg by mouth 2 (Two) Times a Day., Disp: , Rfl: 3  •  isosorbide mononitrate (IMDUR) 30 MG 24 hr tablet, Take 30 mg by mouth Daily., Disp: , Rfl:   •  rosuvastatin (CRESTOR) 20 MG tablet, Take 20 mg by mouth Daily., Disp: , Rfl: 3  Allergies   Allergen Reactions   • Percocet [Oxycodone-Acetaminophen] Nausea And Vomiting     Social History     Socioeconomic History   • Marital status: Single     Spouse name: Not on file   • Number of children: 0   • Years of education: Not on file   • Highest education level: Not on file   Social Needs   • Financial resource strain: Not on file   • Food insecurity - worry: Not on file   • Food insecurity - inability: Not on file   • Transportation needs - medical: Not on file   • Transportation needs - non-medical: Not on file   Occupational History   • Occupation: unemployed      Comment: applied for disability   Tobacco Use   • Smoking status: Current Every Day Smoker     Packs/day: 1.00     Years: 46.00     Pack years: 46.00     Types: Cigarettes   • Smokeless tobacco: Never Used   • Tobacco comment: started smoking at age 12   Substance and Sexual Activity   • Alcohol use: Yes     Comment: 1-2 beers daily   • Drug use: No   • Sexual activity: Not on file   Other Topics Concern   • Not on file   Social History Narrative    Lives in [facility]     Family History   Problem Relation Age of Onset   • Other Mother         brain  "tumor   • Alcohol abuse Father    • Cirrhosis Father      Review of Systems   Constitution: Positive for diaphoresis. Negative for chills, fever, malaise/fatigue, night sweats and weight loss.   HENT: Positive for nosebleeds. Negative for hearing loss, odynophagia and sore throat.    Cardiovascular: Negative for chest pain, dyspnea on exertion, leg swelling, orthopnea and palpitations.   Respiratory: Positive for cough. Negative for hemoptysis.    Endocrine: Negative for cold intolerance, heat intolerance, polydipsia, polyphagia and polyuria.   Hematologic/Lymphatic: Bruises/bleeds easily.   Skin: Negative for itching and rash.   Musculoskeletal: Positive for back pain and falls. Negative for joint pain, joint swelling and myalgias.   Gastrointestinal: Negative for abdominal pain, constipation, diarrhea, hematemesis, hematochezia, melena, nausea and vomiting.   Genitourinary: Negative for dysuria, frequency and hematuria.   Neurological: Positive for loss of balance. Negative for focal weakness, headaches, numbness and seizures.   Psychiatric/Behavioral: Positive for depression. Negative for suicidal ideas. The patient is nervous/anxious.    All other systems reviewed and are negative.    Vitals:    03/14/19 1303   BP: 174/85   BP Location: Left arm   Patient Position: Sitting   Pulse: 104   SpO2: 99%   Weight: 52.2 kg (115 lb)   Height: 160 cm (63\")      Physical Exam  CONSTITUTIONAL:  Alert and conversant, in no acute distress.  EYES:    Eyes anicteric.  EOMI.  PERRLA.  ENT:    No nasal deviation.  Trachea is midline.  NECK:    No masses or JVD.  LUNGS:   Decreased in the bases; no wheezing, rales or rhonchi.  CARDIAC:        Regular rate and rhythm without murmur or rub.  No carotid bruits.  GI:      The abdomen is soft, nontender, nondistended with normal bowel sounds.  No hepatosplenomegaly and no masses.  EXTREMITIES:    No cyanosis, clubbing or edema.  SKIN:    No ulcers.  NEURO:   No focal motor or sensory " deficits.  PSYCH:   Alert and oriented x3; mood and affect good.    Labs/Imaging:  I have reviewed the CT scan demonstrating 75% left carotid stenosis and very small arteries.    Assessment/Plan:  This patient has a very long smoking history.  She was referred by her cardiologist for a 75% left carotid stenosis.  I have recommended carotid endarterectomy.  I have discussed the risks of stroke, bleeding, infection, death, renal failure and also explained the preoperative testing process.  She agrees to proceed.  She has no telephone and no car for transportation.  This will make scheduling arrangements problematic.  She will phone our office within the next week to try to arrange a time for surgery and the necessary preop testing.  We will proceed with left carotid endarterectomy.    Patient Active Problem List   Diagnosis   • Stenosis of left carotid artery     CC: MD Vicky Navarro transcribing on behalf of Dallin Quinn MD      Electronically signed by Dallin Quinn MD at 3/15/2019  7:40 AM       ICU Vital Signs     Row Name 04/03/19 0700 04/03/19 0630 04/03/19 0615 04/03/19 0600 04/03/19 0545       Vitals    Pulse  70  63  70  63  63    Resp  --  --  --  16  --    BP  91/51  102/57  79/66  (Abnormal)   87/45  (Abnormal)   85/42  (Abnormal)     Noninvasive MAP (mmHg)  --  75  70  62  63       Oxygen Therapy    SpO2  96 %  96 %  95 %  98 %  98 %    Pulse Oximetry Type  Continuous  --  --  --  --    Device (Oxygen Therapy)  room air  --  --  room air  --       Art Line    Arterial Line BP  96/44  105/44  104/48  95/43  96/42    Arterial Line MAP (mmHg)  63 mmHg  67 mmHg  69 mmHg  62 mmHg  63 mmHg    Row Name 04/03/19 0530 04/03/19 0515 04/03/19 0505 04/03/19 0445 04/03/19 0430       Vitals    Pulse  67  65  79  70  67    Resp  --  --  16  --  --    BP  89/40  (Abnormal)   89/44  (Abnormal)   81/63  (Abnormal)   78/46  (Abnormal)   78/45  (Abnormal)     Noninvasive MAP (mmHg)  64  71  71   68  60       Oxygen Therapy    SpO2  96 %  96 %  96 %  95 %  97 %    Pulse Oximetry Type  --  --  Continuous  --  --    Device (Oxygen Therapy)  --  --  room air  --  --       Art Line    Arterial Line BP  103/46  99/44  104/51  --  --    Arterial Line MAP (mmHg)  67 mmHg  64 mmHg  72 mmHg  --  --    Row Name 04/03/19 0415 04/03/19 0400 04/03/19 0345 04/03/19 0330 04/03/19 0315       Vitals    Temp  --  97.9 °F (36.6 °C)  --  --  --    Temp src  --  Oral  --  --  --    Pulse  68  88  70  59  60    Resp  --  18  --  --  --    BP  96/52  100/46  99/54  94/54  95/52    Noninvasive MAP (mmHg)  72  71  85  70  74       Oxygen Therapy    SpO2  96 %  97 %  96 %  96 %  96 %    Device (Oxygen Therapy)  --  room air  --  --  --       Art Line    Arterial Line BP  95/45  --  101/47  --  --    Arterial Line MAP (mmHg)  64 mmHg  --  67 mmHg  --  --    Row Name 04/03/19 0300 04/03/19 0245 04/03/19 0230 04/03/19 0215 04/03/19 0200       Vitals    Pulse  61  65  61  93  63    Resp  14  --  --  --  16  (Pended)     BP  101/55  103/63  106/61  113/59  88/53  (Abnormal)     Noninvasive MAP (mmHg)  81  79  78  77  70       Oxygen Therapy    SpO2  96 %  97 %  96 %  95 %  94 %    Device (Oxygen Therapy)  room air  --  --  --  room air       Art Line    Arterial Line BP  95/44  101/46  100/44  105/57  83/40    Arterial Line MAP (mmHg)  63 mmHg  66 mmHg  65 mmHg  77 mmHg  56 mmHg    Row Name 04/03/19 0145 04/03/19 0130 04/03/19 0115 04/03/19 0100 04/03/19 0045       Vitals    Pulse  61  61  63  69  71    BP  87/50  (Abnormal)   87/52  (Abnormal)   86/50  (Abnormal)   91/49  92/51    Noninvasive MAP (mmHg)  69  69  68  69  67       Oxygen Therapy    SpO2  95 %  95 %  94 %  95 %  93 %    Device (Oxygen Therapy)  --  --  --  room air  --       Art Line    Arterial Line BP  82/40  81/39  --  90/44  94/46    Arterial Line MAP (mmHg)  55 mmHg  56 mmHg  --  61 mmHg  64 mmHg    Row Name 04/03/19 0030 04/03/19 0015 04/03/19 0000 04/02/19 2347  04/02/19 2330       Vitals    Temp  --  --  98 °F (36.7 °C)  --  --    Temp src  --  --  Oral  --  --    Pulse  73  80  69  75  72    Resp  --  --  18  --  --    BP  101/59  98/50  91/50  90/51  93/52    Noninvasive MAP (mmHg)  80  62  67  70  70       Oxygen Therapy    SpO2  96 %  93 %  98 %  98 %  98 %    Pulse Oximetry Type  --  --  Continuous  --  --    Device (Oxygen Therapy)  --  --  room air  --  --       Art Line    Arterial Line BP  92/45  --  92/45  --  93/43    Arterial Line MAP (mmHg)  63 mmHg  --  64 mmHg  --  62 mmHg    Row Name 04/02/19 2315 04/02/19 2300 04/02/19 2245 04/02/19 2230 04/02/19 2215       Vitals    Pulse  84  73  74  79  75    BP  --  99/63  98/60  106/57  87/55  (Abnormal)     Noninvasive MAP (mmHg)  --  78  74  78  72       Oxygen Therapy    SpO2  97 %  98 %  97 %  98 %  96 %    Pulse Oximetry Type  --  Continuous  (Pended)   --  --  --    Device (Oxygen Therapy)  --  nasal cannula  --  --  --    Flow (L/min)  --  2  --  --  --       Art Line    Arterial Line BP  99/50  --  --  --  --    Arterial Line MAP (mmHg)  70 mmHg  --  --  --  --    Row Name 04/02/19 2200 04/02/19 2145 04/02/19 2130 04/02/19 2115 04/02/19 2100       Vitals    Pulse  76  78  83  90  102    Resp  --  --  --  --  16    BP  96/54  88/55  (Abnormal)   95/55  114/68  102/61    Noninvasive MAP (mmHg)  70  64  75  79  84       Oxygen Therapy    SpO2  95 %  96 %  93 %  93 %  92 %    Pulse Oximetry Type  --  --  --  --  Continuous    Device (Oxygen Therapy)  nasal cannula  --  --  --  nasal cannula    Flow (L/min)  2  --  --  --  2       Art Line    Arterial Line BP  --  --  --  --  88/48    Arterial Line MAP (mmHg)  --  --  --  --  66 mmHg    Row Name 04/02/19 2045 04/02/19 2030 04/02/19 2015 04/02/19 2013 04/02/19 2000       Vitals    Temp  --  --  --  --  98.1 °F (36.7 °C)    Temp src  --  --  --  --  Oral    Pulse  98  85  78  81  83    Heart Rate Source  --  --  --  Monitor  --    Resp  --  --  --  20  18    Resp  Rate Source  --  --  --  Visual  --    BP  125/92  100/72  119/63  --  109/67    Noninvasive MAP (mmHg)  112  81  92  --  82       Oxygen Therapy    SpO2  94 %  93 %  98 %  95 %  94 %    Pulse Oximetry Type  --  --  --  Continuous  Continuous    Device (Oxygen Therapy)  --  --  --  nasal cannula  nasal cannula    Flow (L/min)  --  --  --  2  2       Art Line    Arterial Line BP  98/62  --  --  --  --    Arterial Line MAP (mmHg)  78 mmHg  --  --  --  --    Row Name 04/02/19 1945 04/02/19 1930 04/02/19 1915 04/02/19 1900 04/02/19 1845       Vitals    Pulse  92  78  89  118  84    BP  105/89  98/69  96/68  99/80  --    Noninvasive MAP (mmHg)  93  80  75  93  --       Oxygen Therapy    SpO2  95 %  95 %  94 %  --  92 %    Row Name 04/02/19 1830 04/02/19 1815 04/02/19 1800 04/02/19 1745 04/02/19 1730       Vitals    Pulse  106  79  89  84  118    Heart Rate Source  Monitor  --  Monitor  Monitor  Monitor    Resp  22  --  18  16  18    Resp Rate Source  Visual  --  Visual  Visual  Visual    BP  115/94  100/71  99/64  112/63  126/78    Noninvasive MAP (mmHg)  96  84  79  78  96    BP Location  Left arm  --  Left arm  Left arm  Left arm    BP Method  Automatic  --  Automatic  Automatic  Automatic    Patient Position  Lying  --  Lying  Lying  Lying       Oxygen Therapy    SpO2  95 %  84 %  (Abnormal)   93 %  92 %  93 %    Pulse Oximetry Type  Continuous  --  Continuous  Continuous  Continuous    Device (Oxygen Therapy)  nasal cannula;humidified  --  humidified;nasal cannula  humidified;nasal cannula  nasal cannula;humidified    Flow (L/min)  2  --  2  2  2    Row Name 04/02/19 1716 04/02/19 1715 04/02/19 1714 04/02/19 1711 04/02/19 1710       Vitals    Temp  --  --  --  --  97.8 °F (36.6 °C)    Temp src  --  --  --  --  Axillary    Pulse  89  84  93  103  89    Heart Rate Source  --  Monitor  --  --  Monitor    Resp  --  16  --  --  18    Resp Rate Source  --  Visual  --  --  Visual    BP  --  121/73  --  --  107/80     Noninvasive MAP (mmHg)  --  98  --  --  91    BP Location  --  Left arm  --  --  Left arm    BP Method  --  Automatic  --  --  Automatic    Patient Position  --  Lying  --  --  Lying       Oxygen Therapy    SpO2  94 %  94 %  92 %  95 %  95 %    Pulse Oximetry Type  --  Continuous  --  --  Continuous    Device (Oxygen Therapy)  --  humidified;nasal cannula  --  --  nasal cannula;humidified    Flow (L/min)  --  2  --  --  2    Row Name 04/02/19 1709 04/02/19 1700 04/02/19 1645 04/02/19 1630 04/02/19 1615       Height and Weight    Weight  54.8 kg (120 lb 13 oz)  --  --  --  --    Weight Method  Bed scale  --  --  --  --       Vitals    Temp  97.9 °F (36.6 °C)  --  --  97.4 °F (36.3 °C)  --    Temp src  Axillary  --  --  Tympanic  --    Pulse  91  --  81  88  83    Heart Rate Source  Monitor  --  Monitor  Monitor  Monitor    Resp  16  --  14 16 13    Resp Rate Source  --  --  Visual  Visual  Visual    BP  124/75  --  102/82  103/68  95/61 cardene gtt turned off    Noninvasive MAP (mmHg)  95  --  95  82  77       Oxygen Therapy    SpO2  95 %  --  95 %  99 %  96 %    Pulse Oximetry Type  Continuous  --  Continuous  --  Continuous    Device (Oxygen Therapy)  nasal cannula  nasal cannula  nasal cannula  nasal cannula  nasal cannula    Flow (L/min)  2  2  2  2  2       Art Line    Arterial Line BP  --  --  100/57  97/54  87/49    Arterial Line MAP (mmHg)  --  --  73 mmHg  73 mmHg  64 mmHg    Row Name 04/02/19 1613 04/02/19 1605 04/02/19 1600 04/02/19 1545 04/02/19 1530       Vitals    Temp  --  --  97.1 °F (36.2 °C)  97.3 °F (36.3 °C)  97.5 °F (36.4 °C)    Temp src  --  --  Temporal  Temporal  Temporal    Pulse  --  86  87  94  94    Heart Rate Source  --  --  Monitor  Monitor  Monitor    Resp  --  --  16 16  16    Resp Rate Source  --  --  Visual  Visual  Visual    BP  --  --  108/64  118/70  93/84    Noninvasive MAP (mmHg)  --  --  81  83  87    BP Location  --  --  Left arm  Left arm  Left arm    BP Method  --  --   "Automatic  Automatic  Automatic    Patient Position  --  --  Sitting  Sitting  Sitting       Oxygen Therapy    SpO2  --  94 %  96 %  98 %  96 %    Pulse Oximetry Type  --  --  Continuous  Continuous  Continuous    Device (Oxygen Therapy)  nasal cannula  --  nasal cannula  nasal cannula  nasal cannula    Flow (L/min)  2  --  2  2  2       Art Line    Arterial Line BP  --  95/52  94/52  106/61  --    Arterial Line MAP (mmHg)  --  70 mmHg  69 mmHg  79 mmHg  --    Row Name 04/02/19 1525 04/02/19 1519 04/02/19 1121             Height and Weight    Height  --  --  160 cm (63\")      Height Method  --  --  Stated      Weight  --  --  52.6 kg (116 lb)      Weight Method  --  --  -- confirmed with pt      Ideal Body Weight (IBW) (kg)  --  --  52.72      BSA (Calculated - sq m)  --  --  1.53 sq meters      BMI (Calculated)  --  --  20.6      Weight in (lb) to have BMI = 25  --  --  140.8         Vitals    Temp  97.5 °F (36.4 °C)  97.6 °F (36.4 °C)  98 °F (36.7 °C)      Temp src  Temporal  Temporal  Temporal      Pulse  99  110  92      Heart Rate Source  Monitor  Monitor  Monitor      Resp  16  18  18      Resp Rate Source  Visual  Visual  Visual      BP  129/61  132/68  138/79      Noninvasive MAP (mmHg)  80  83  --      BP Location  Left arm  Left arm  Right arm      BP Method  Automatic  Automatic  Automatic      Patient Position  Sitting  Sitting  Lying         Oxygen Therapy    SpO2  98 %  98 %  96 %      Pulse Oximetry Type  Continuous  Continuous  --      Device (Oxygen Therapy)  nasal cannula  nasal cannula  room air      Flow (L/min)  3  4  --          Hospital Medications (active)       Dose Frequency Start End    amLODIPine (NORVASC) tablet 10 mg 10 mg Daily 4/3/2019     Sig - Route: Take 1 tablet by mouth Daily. - Oral    aspirin EC tablet 81 mg 81 mg Daily 4/3/2019     Sig - Route: Take 1 tablet by mouth Daily. - Oral    budesonide-formoterol (SYMBICORT) 160-4.5 MCG/ACT inhaler 2 puff 2 puff 2 Times Daily - RT " 4/2/2019     Sig - Route: Inhale 2 puffs 2 (Two) Times a Day. - Inhalation    ceFAZolin in dextrose (ANCEF) IVPB solution 2 g 2 g Once 4/2/2019 4/2/2019    Sig - Route: Infuse 100 mL into a venous catheter 1 (One) Time. - Intravenous    ceFAZolin in dextrose (ANCEF) IVPB solution 2 g 2 g Every 8 Hours 4/2/2019 4/3/2019    Sig - Route: Infuse 100 mL into a venous catheter Every 8 (Eight) Hours. - Intravenous    Chlorhexidine Gluconate Cloth 2 % pads 1 application 1 application Every 12 Hours PRN 4/2/2019     Sig - Route: Apply 1 application topically to the appropriate area as directed Every 12 (Twelve) Hours As Needed (12 hours night before surgery and repeat in AM prior to surgery.). - Topical    clopidogrel (PLAVIX) tablet 75 mg 75 mg Daily 4/3/2019     Sig - Route: Take 1 tablet by mouth Daily. - Oral    famotidine (PEPCID) tablet 20 mg 20 mg 2 Times Daily 4/2/2019     Sig - Route: Take 1 tablet by mouth 2 (Two) Times a Day. - Oral    gabapentin (NEURONTIN) capsule 300 mg 300 mg Every 12 Hours Scheduled 4/2/2019     Sig - Route: Take 1 capsule by mouth Every 12 (Twelve) Hours. - Oral    HYDROcodone-acetaminophen (NORCO) 5-325 MG per tablet 1 tablet 1 tablet Every 6 Hours PRN 4/2/2019 4/12/2019    Sig - Route: Take 1 tablet by mouth Every 6 (Six) Hours As Needed for Moderate Pain . - Oral    ipratropium-albuterol (DUO-NEB) nebulizer solution 3 mL 3 mL 4 Times Daily - RT 4/2/2019     Sig - Route: Take 3 mL by nebulization 4 (Four) Times a Day. - Nebulization    lactated ringers infusion 9 mL/hr Continuous 4/2/2019     Sig - Route: Infuse 9 mL/hr into a venous catheter Continuous. - Intravenous    lidocaine PF 1% (XYLOCAINE) injection 0.5 mL 0.5 mL Once As Needed 4/2/2019 4/2/2019    Sig - Route: Inject 0.5 mL as directed 1 (One) Time As Needed (IV Start). - Injection    Morphine sulfate (PF) injection 2 mg 2 mg Every 4 Hours PRN 4/2/2019 4/12/2019    Sig - Route: Infuse 0.5 mL into a venous catheter Every 4 (Four)  Hours As Needed for Severe Pain . - Intravenous    niCARdipine (CARDENE) 20 mg in sodium chloride 0.9 % 200 mL (0.1 mg/mL) infusion 3-15 mg/hr Once 4/2/2019     Sig - Route: Infuse 3-15 mg/hr into a venous catheter 1 (One) Time. - Intravenous    niCARdipine (CARDENE-IV) 20 mg/200 mL (0.1 mg/mL) in 0.9% NaCl infusion 5-15 mg/hr Titrated 4/2/2019     Sig - Route: Infuse 5-15 mg/hr into a venous catheter Dose Adjusted By Provider As Needed. - Intravenous    nicotine (NICODERM CQ) 21 MG/24HR patch 1 patch 1 patch Every 24 Hours Scheduled 4/2/2019     Sig - Route: Place 1 patch on the skin as directed by provider Daily. - Transdermal    phenylephrine (SAHRA-SYNEPHRINE) 50 mg in sodium chloride 0.9 % 250 mL (0.2 mg/mL) infusion 0.5-3 mcg/kg/min × 52.6 kg Titrated 4/2/2019     Sig - Route: Infuse 26.3-157.8 mcg/min into a venous catheter Dose Adjusted By Provider As Needed. - Intravenous    rosuvastatin (CRESTOR) tablet 20 mg 20 mg Daily 4/3/2019     Sig - Route: Take 1 tablet by mouth Daily. - Oral    sodium chloride 0.45 % infusion 30 mL/hr Continuous 4/2/2019     Sig - Route: Infuse 30 mL/hr into a venous catheter Continuous. - Intravenous    sodium chloride 0.9 % flush 1-10 mL 1-10 mL As Needed 4/2/2019     Sig - Route: Infuse 1-10 mL into a venous catheter As Needed for Line Care. - Intravenous    sodium chloride 0.9 % flush 3 mL 3 mL Every 12 Hours Scheduled 4/2/2019     Sig - Route: Infuse 3 mL into a venous catheter Every 12 (Twelve) Hours. - Intravenous    bacitracin 50,000 Units in lactated ringers 500 mL irrigation (Discontinued)  As Needed 4/2/2019 4/2/2019    Sig: As Needed.    Reason for Discontinue: Patient Discharge    cefuroxime (ZINACEF) 1.5 g/100 mL 0.9% NS IVPB (mbp) (Discontinued) 1.5 g Once 4/2/2019 4/2/2019    Sig - Route: Infuse 100 mL into a venous catheter 1 (One) Time. - Intravenous    Reason for Discontinue: *Re-Entry    cefuroxime (ZINACEF) 1.5 g/100 mL 0.9% NS IVPB (mbp) (Discontinued) 1.5 g  Every 8 Hours 4/2/2019 4/2/2019    Sig - Route: Infuse 100 mL into a venous catheter Every 8 (Eight) Hours. - Intravenous    Reason for Discontinue: Duplicate order    dexamethasone (DECADRON) injection (Discontinued)  As Needed 4/2/2019 4/2/2019    Sig: As Needed.    Reason for Discontinue: Anesthesia Stop    esmolol (BREVIBLOC) injection (Discontinued)  As Needed 4/2/2019 4/2/2019    Sig: As Needed.    Reason for Discontinue: Anesthesia Stop    famotidine (PEPCID) tablet 20 mg (Discontinued) 20 mg Once 4/2/2019 4/2/2019    Sig - Route: Take 1 tablet by mouth 1 (One) Time. - Oral    Reason for Discontinue: Patient Transfer    famotidine (PEPCID) tablet 20 mg (Discontinued) 20 mg Daily 4/3/2019 4/2/2019    Sig - Route: Take 1 tablet by mouth Daily. - Oral    fentaNYL citrate (PF) (SUBLIMAZE) injection 50 mcg (Discontinued) 50 mcg Every 5 Minutes PRN 4/2/2019 4/2/2019    Sig - Route: Infuse 1 mL into a venous catheter Every 5 (Five) Minutes As Needed for Moderate Pain . - Intravenous    Reason for Discontinue: Patient Transfer    fentaNYL citrate (PF) (SUBLIMAZE) injection (Discontinued)  As Needed 4/2/2019 4/2/2019    Sig: As Needed.    Reason for Discontinue: Anesthesia Stop    glycopyrrolate (ROBINUL) injection (Discontinued)  As Needed 4/2/2019 4/2/2019    Sig: As Needed.    Reason for Discontinue: Anesthesia Stop    heparin (porcine) injection (Discontinued)  As Needed 4/2/2019 4/2/2019    Sig: As Needed.    Reason for Discontinue: Anesthesia Stop    hydrALAZINE (APRESOLINE) injection 5 mg (Discontinued) 5 mg Every 10 Minutes PRN 4/2/2019 4/2/2019    Sig - Route: Infuse 0.25 mL into a venous catheter Every 10 (Ten) Minutes As Needed for High Blood Pressure (for systolic blood pressure greater than 160mmHg or diastolic blood pressure greater than 105 mmHg). - Intravenous    Reason for Discontinue: Patient Transfer    HYDROcodone-acetaminophen (NORCO) 5-325 MG per tablet 1 tablet (Discontinued) 1 tablet Once As  Needed 4/2/2019 4/2/2019    Sig - Route: Take 1 tablet by mouth 1 (One) Time As Needed for Moderate Pain  (pain). - Oral    Reason for Discontinue: Patient Transfer    HYDROmorphone (DILAUDID) injection 0.5 mg (Discontinued) 0.5 mg Every 10 Minutes PRN 4/2/2019 4/2/2019    Sig - Route: Infuse 0.5 mL into a venous catheter Every 10 (Ten) Minutes As Needed for Severe Pain . - Intravenous    Reason for Discontinue: Patient Transfer    ipratropium-albuterol (DUO-NEB) nebulizer solution 3 mL (Discontinued) 3 mL Once As Needed 4/2/2019 4/2/2019    Sig - Route: Take 3 mL by nebulization 1 (One) Time As Needed for Wheezing or Shortness of Air (bronchospasm). - Nebulization    Reason for Discontinue: Patient Transfer    labetalol (NORMODYNE,TRANDATE) injection 5 mg (Discontinued) 5 mg Every 5 Minutes PRN 4/2/2019 4/2/2019    Sig - Route: Infuse 1 mL into a venous catheter Every 5 (Five) Minutes As Needed for High Blood Pressure (for systolic blood pressure greater than 1 mmHg or diastolic blood pressure greater than 105 mmHg). - Intravenous    Reason for Discontinue: Patient Transfer    lactated ringers bolus 500 mL (Discontinued) 500 mL Once As Needed 4/2/2019 4/2/2019    Sig - Route: Infuse 500 mL into a venous catheter 1 (One) Time As Needed (Hypotension). - Intravenous    Reason for Discontinue: Patient Transfer    lactated ringers infusion (Discontinued)  Continuous PRN 4/2/2019 4/2/2019    Sig: Continuous As Needed.    Reason for Discontinue: Anesthesia Stop    lidocaine (XYLOCAINE) 1 % injection (Discontinued)  As Needed 4/2/2019 4/2/2019    Sig: As Needed.    Reason for Discontinue: Patient Discharge    lidocaine PF 1% (XYLOCAINE) injection (Discontinued)  As Needed 4/2/2019 4/2/2019    Sig: As Needed.    Reason for Discontinue: Anesthesia Stop    meperidine (DEMEROL) injection 12.5 mg (Discontinued) 12.5 mg Every 5 Minutes PRN 4/2/2019 4/2/2019    Sig - Route: Infuse 0.5 mL into a venous catheter Every 5 (Five)  "Minutes As Needed for Shivering (May repeat x 1). - Intravenous    Reason for Discontinue: Patient Transfer    naloxone (NARCAN) injection 0.4 mg (Discontinued) 0.4 mg As Needed 4/2/2019 4/2/2019    Sig - Route: Infuse 1 mL into a venous catheter As Needed for Opioid Reversal (unresponsiveness, decrease oxygen saturation). - Intravenous    Reason for Discontinue: Patient Transfer    neostigmine injection (Discontinued)  As Needed 4/2/2019 4/2/2019    Sig: As Needed.    Reason for Discontinue: Anesthesia Stop    niCARdipine (CARDENE) 0.1 mg/mL in sodium chloride 0.9 % 260.4 mL infusion (Discontinued)  Continuous PRN 4/2/2019 4/2/2019    Sig: Continuous As Needed.    Reason for Discontinue: Anesthesia Stop    niCARdipine (CARDENE) injection (Discontinued)  As Needed 4/2/2019 4/2/2019    Sig: As Needed.    Reason for Discontinue: Anesthesia Stop    ondansetron (ZOFRAN) injection 4 mg (Discontinued) 4 mg Once As Needed 4/2/2019 4/2/2019    Sig - Route: Infuse 2 mL into a venous catheter 1 (One) Time As Needed for Nausea or Vomiting. - Intravenous    Reason for Discontinue: Patient Transfer    ondansetron (ZOFRAN) injection (Discontinued)  As Needed 4/2/2019 4/2/2019    Sig: As Needed.    Reason for Discontinue: Anesthesia Stop    phenylephrine (SAHRA-SYNEPHRINE) 50 mg in sodium chloride 0.9 % 250 mL infusion (Discontinued)  Continuous PRN 4/2/2019 4/2/2019    Sig: Continuous As Needed.    Reason for Discontinue: Anesthesia Stop    phenylephrine (SAHRA-SYNEPHRINE) injection (Discontinued)  As Needed 4/2/2019 4/2/2019    Sig: As Needed.    Reason for Discontinue: Anesthesia Stop    promethazine (PHENERGAN) injection 6.25 mg (Discontinued) 6.25 mg Once As Needed 4/2/2019 4/2/2019    Sig - Route: Infuse 0.25 mL into a venous catheter 1 (One) Time As Needed for Nausea or Vomiting. - Intravenous    Reason for Discontinue: Patient Transfer    Linked Group 1:  \"Or\" Linked Group Details        promethazine (PHENERGAN) injection 6.25 " "mg (Discontinued) 6.25 mg Once As Needed 4/2/2019 4/2/2019    Sig - Route: Inject 0.25 mL into the appropriate muscle as directed by prescriber 1 (One) Time As Needed for Nausea or Vomiting. - Intramuscular    Reason for Discontinue: Patient Transfer    Linked Group 1:  \"Or\" Linked Group Details        promethazine (PHENERGAN) suppository 25 mg (Discontinued) 25 mg Once As Needed 4/2/2019 4/2/2019    Sig - Route: Insert 1 suppository into the rectum 1 (One) Time As Needed for Nausea or Vomiting. - Rectal    Reason for Discontinue: Patient Transfer    Linked Group 1:  \"Or\" Linked Group Details        promethazine (PHENERGAN) tablet 25 mg (Discontinued) 25 mg Once As Needed 4/2/2019 4/2/2019    Sig - Route: Take 1 tablet by mouth 1 (One) Time As Needed for Nausea or Vomiting. - Oral    Reason for Discontinue: Patient Transfer    Linked Group 1:  \"Or\" Linked Group Details        Propofol (DIPRIVAN) injection (Discontinued)  As Needed 4/2/2019 4/2/2019    Sig: As Needed.    Reason for Discontinue: Anesthesia Stop    protamine injection (Discontinued)  As Needed 4/2/2019 4/2/2019    Sig: As Needed.    Reason for Discontinue: Anesthesia Stop    rocuronium (ZEMURON) injection (Discontinued)  As Needed 4/2/2019 4/2/2019    Sig: As Needed.    Reason for Discontinue: Anesthesia Stop    sodium chloride 0.9 % flush 1-10 mL (Discontinued) 1-10 mL As Needed 4/2/2019 4/2/2019    Sig - Route: Infuse 1-10 mL into a venous catheter As Needed for Line Care. - Intravenous    Reason for Discontinue: Patient Transfer    sodium chloride 0.9 % flush 3 mL (Discontinued) 3 mL Every 12 Hours Scheduled 4/2/2019 4/2/2019    Sig - Route: Infuse 3 mL into a venous catheter Every 12 (Twelve) Hours. - Intravenous    Reason for Discontinue: Patient Transfer    sodium chloride 0.9 % flush 3 mL (Discontinued) 3 mL Every 12 Hours Scheduled 4/2/2019 4/2/2019    Sig - Route: Infuse 3 mL into a venous catheter Every 12 (Twelve) Hours. - Intravenous    " Reason for Discontinue: Patient Transfer    sodium chloride 0.9 % flush 3-10 mL (Discontinued) 3-10 mL As Needed 4/2/2019 4/2/2019    Sig - Route: Infuse 3-10 mL into a venous catheter As Needed for Line Care. - Intravenous    Reason for Discontinue: Patient Transfer    sodium chloride 0.9 % solution (Discontinued)  As Needed 4/2/2019 4/2/2019    Sig: As Needed.    Reason for Discontinue: Patient Discharge    sodium chloride 1,000 mL with heparin (porcine) 10,000 Units mixture (Discontinued)  As Needed 4/2/2019 4/2/2019    Sig: As Needed.    Reason for Discontinue: Patient Discharge    sterile water irrigation solution (Discontinued)  As Needed 4/2/2019 4/2/2019    Sig: As Needed.    Reason for Discontinue: Patient Discharge    TACHOSIL patch (Discontinued)  As Needed 4/2/2019 4/2/2019    Sig: As Needed.    Reason for Discontinue: Patient Discharge            Lab Results (last 24 hours)     Procedure Component Value Units Date/Time    Basic Metabolic Panel [[id number]]  (Abnormal) Collected:  04/03/19 0359    Specimen:  Blood Updated:  04/03/19 0511     Glucose 220 mg/dL      BUN 9 mg/dL      Creatinine 0.79 mg/dL      Sodium 131 mmol/L      Potassium 3.7 mmol/L      Chloride 105 mmol/L      CO2 20.0 mmol/L      Calcium 9.2 mg/dL      eGFR Non African Amer 75 mL/min/1.73      BUN/Creatinine Ratio 11.4     Anion Gap 6.0 mmol/L     Narrative:       [employer] Guidelines    Stage     Description        GFR  1         Normal or High     90+  2         Mild decrease      60-89  3         Moderate decrease  30-59  4         Severe decrease    15-29  5         Kidney failure     <15    The MDRD GFR formula is only valid for adults with stable renal function between ages 18 and 70.    CBC & Differential [[id number]] Collected:  04/03/19 0359    Specimen:  Blood Updated:  04/03/19 0459    Narrative:       The following orders were created for panel order CBC & Differential.  Procedure                                Abnormality         Status                     ---------                               -----------         ------                     CBC Auto Differential[[id number]]        Abnormal            Final result                 Please view results for these tests on the individual orders.    CBC Auto Differential [[id number]]  (Abnormal) Collected:  04/03/19 0359    Specimen:  Blood Updated:  04/03/19 0453     WBC 14.84 10*3/mm3      RBC 3.89 10*6/mm3      Hemoglobin 11.7 g/dL      Hematocrit 35.4 %      MCV 91.0 fL      MCH 30.1 pg      MCHC 33.1 g/dL      RDW 13.1 %      RDW-SD 43.3 fl      MPV 10.7 fL      Platelets 279 10*3/mm3      Neutrophil % 87.1 %      Lymphocyte % 10.2 %      Monocyte % 2.6 %      Eosinophil % 0.0 %      Basophil % 0.1 %      Immature Grans % 0.3 %      Neutrophils, Absolute 12.93 10*3/mm3      Lymphocytes, Absolute 1.51 10*3/mm3      Monocytes, Absolute 0.39 10*3/mm3      Eosinophils, Absolute 0.00 10*3/mm3      Basophils, Absolute 0.01 10*3/mm3      Immature Grans, Absolute 0.04 10*3/mm3     Tissue Pathology Exam [[id number]] Collected:  04/02/19 1414    Specimen:  Tissue from Carotid Plaque Updated:  04/02/19 1542        Imaging Results (last 24 hours)     ** No results found for the last 24 hours. **        Orders (last 24 hrs)     Start     Ordered    04/03/19 0900  rosuvastatin (CRESTOR) tablet 20 mg  Daily      04/02/19 1708 04/03/19 0900  famotidine (PEPCID) tablet 20 mg  Daily,   Status:  Discontinued      04/02/19 1708 04/03/19 0900  aspirin EC tablet 81 mg  Daily      04/02/19 1708 04/03/19 0900  amLODIPine (NORVASC) tablet 10 mg  Daily      04/02/19 1708 04/03/19 0900  clopidogrel (PLAVIX) tablet 75 mg  Daily      04/02/19 1708 04/03/19 0743  Inpatient Admission  Once      04/03/19 0743    04/03/19 0717  Ambulate Patient  Every Shift      04/03/19 0716    04/03/19 0600  Basic Metabolic Panel  Morning Draw      04/02/19 1708 04/03/19 0600  CBC & Differential   Morning Draw      04/02/19 1708    04/03/19 0600  CBC Auto Differential  PROCEDURE ONCE      04/03/19 0002    04/02/19 2130  budesonide-formoterol (SYMBICORT) 160-4.5 MCG/ACT inhaler 2 puff  2 Times Daily - RT      04/02/19 1846    04/02/19 2100  sodium chloride 0.9 % flush 3 mL  Every 12 Hours Scheduled,   Status:  Discontinued      04/02/19 1543    04/02/19 2100  sodium chloride 0.9 % flush 3 mL  Every 12 Hours Scheduled      04/02/19 1708    04/02/19 2100  gabapentin (NEURONTIN) capsule 300 mg  Every 12 Hours Scheduled      04/02/19 1521    04/02/19 2100  ceFAZolin in dextrose (ANCEF) IVPB solution 2 g  Every 8 Hours      04/02/19 1712    04/02/19 2100  famotidine (PEPCID) tablet 20 mg  2 Times Daily      04/02/19 1846    04/02/19 2030  ipratropium-albuterol (DUO-NEB) nebulizer solution 3 mL  4 Times Daily - RT      04/02/19 1846    04/02/19 1800  Incentive Spirometry  Every 2 Hours While Awake      04/02/19 1708    04/02/19 1800  cefuroxime (ZINACEF) 1.5 g/100 mL 0.9% NS IVPB (mbp)  Every 8 Hours,   Status:  Discontinued      04/02/19 1708    04/02/19 1800  niCARdipine (CARDENE-IV) 20 mg/200 mL (0.1 mg/mL) in 0.9% NaCl infusion  Titrated      04/02/19 1708    04/02/19 1800  phenylephrine (SAHRA-SYNEPHRINE) 50 mg in sodium chloride 0.9 % 250 mL (0.2 mg/mL) infusion  Titrated      04/02/19 1708    04/02/19 1721  Chlorhexidine Gluconate Cloth 2 % pads 1 application  Every 12 Hours PRN      04/02/19 1721    04/02/19 1709  Code Status and Medical Interventions:  Continuous      04/02/19 1708    04/02/19 1709  Neurological Measures  Continuous      04/02/19 1708    04/02/19 1709  Vital Signs and Surgical / Access Site Checks- As Specified  Continuous     Comments:  Perform Vital Signs and Surgical / Access Site checks as specified.    For patients in ICU:    Q 15 minutes X 4  Q 30 minutes X 2  Q 1 hour X 4  Q 2 hours until discharged.      For patients not in ICU:    Q 15 minutes X 4  Q 30 minutes X 2  Q 1 hour X 4  Q 4  hours X 2 or until discharged.    04/02/19 1708    04/02/19 1709  Neuro Checks - As Specified  Until Discontinued     Comments:  Perform Neuro checks as specified:    For patients in ICU:    Q 15 minutes X 4  Q 30 minutes X 2  Q 1 hour X 10  Q 2 hours until discharged.      For patients not in ICU:    Q 15 minutes X 4  Q 30 minutes X 2  Q 1 hour X 10  Q 4 hours X 2 or until discharged.    04/02/19 1708    04/02/19 1709  Peripheral Vascular Checks - As Specified  Until Discontinued     Comments:  Perform Vital Signs as specified.    For patients in ICU:    Q 15 minutes X 4  Q 30 minutes X 2  Q 1 hour X 4  Q 2 hours until discharged.      For patients not in ICU:    Q 15 minutes X 4  Q 30 minutes X 2  Q 1 hour X 4  Q 4 hours X 2 or until discharged.    04/02/19 1708    04/02/19 1709  Cardiac Monitoring  Continuous      04/02/19 1708 04/02/19 1709  Monitor Arterial Line  Every Shift      04/02/19 1708 04/02/19 1709  Provide Stroke Educational Material  Once     Comments:  Provide and review Stroke Education Booklet with patient/family. Sign mutually agreed goals (last page) and scan into media.    04/02/19 1708 04/02/19 1709  Monitor Surgical Site Drain Output  Every Shift      04/02/19 1708    04/02/19 1709  Notify Physician  - As Specified  Until Discontinued     Comments:  Notify Physician for:    SBP greater than 180 or less than 90.    HR greater than 120 or less than 60.    Urine output less than 30 mL per hour.    04/02/19 1708    04/02/19 1709  Notify Provider - Neurological Deterioration, New Headache, Acute Hypertension, Nausea or Vomiting  Until Discontinued      04/02/19 1708    04/02/19 1709  Notify Provider for surgical site swelling, bleeding, or hematoma  Until Discontinued      04/02/19 1708    04/02/19 1709  Notify Physician for access site swelling, bleeding, hematoma or change in peripheral pulses, temperature, color or sensation in extremity  Until Discontinued      04/02/19 1708     04/02/19 1709  Oxygen Therapy- Nasal Cannula; Titrate for SPO2: equal to or greater than, 92%, per policy  Continuous      04/02/19 1708    04/02/19 1709  Continuous Pulse Oximetry  Continuous      04/02/19 1708    04/02/19 1709  Insert Peripheral IV  Once      04/02/19 1708    04/02/19 1709  Saline Lock & Maintain IV Access  Continuous      04/02/19 1708    04/02/19 1708  sodium chloride 0.9 % flush 1-10 mL  As Needed      04/02/19 1708    04/02/19 1706  Diet Regular; Consistent Carbohydrate, Cardiac  Diet Effective Now      04/02/19 1705    04/02/19 1706  Advance Diet as Tolerated  Until Discontinued      04/02/19 1705 04/02/19 1706  DIET MESSAGE Please send dinner tray as soon as possible  Once     Comments:  Please send dinner tray as soon as possible    04/02/19 1705    04/02/19 1548  sodium chloride 0.45 % infusion  Continuous      04/02/19 1546    04/02/19 1544  Vital signs every 5 minutes for 15 minutes, every 15 minutes thereafter.  Once,   Status:  Canceled      04/02/19 1543    04/02/19 1544  Call Anesthesiologist for additional IV Fluid bolus for Hypotension/Tachycardia  Continuous,   Status:  Canceled      04/02/19 1543    04/02/19 1544  Notify Anesthesia of Any Acute Changes in Patient Condition  Until Discontinued,   Status:  Canceled      04/02/19 1543    04/02/19 1544  Notify Anesthesia for Unrelieved Pain  Until Discontinued,   Status:  Canceled      04/02/19 1543    04/02/19 1544  Insert Peripheral IV  Once,   Status:  Canceled      04/02/19 1543    04/02/19 1544  Saline Lock & Maintain IV Access  Continuous,   Status:  Canceled      04/02/19 1543    04/02/19 1544  Once DC criteria to floor met, follow surgeon's orders.  Until Discontinued,   Status:  Canceled      04/02/19 1543    04/02/19 1544  Discharge patient from PACU when discharge criteria is met.  Until Discontinued,   Status:  Canceled      04/02/19 1543    04/02/19 1543  sodium chloride 0.9 % flush 1-10 mL  As Needed,   Status:   Discontinued      04/02/19 1543    04/02/19 1543  lactated ringers bolus 500 mL  Once As Needed,   Status:  Discontinued      04/02/19 1543    04/02/19 1543  HYDROcodone-acetaminophen (NORCO) 5-325 MG per tablet 1 tablet  Once As Needed,   Status:  Discontinued      04/02/19 1543    04/02/19 1543  HYDROmorphone (DILAUDID) injection 0.5 mg  Every 10 Minutes PRN,   Status:  Discontinued      04/02/19 1543    04/02/19 1543  fentaNYL citrate (PF) (SUBLIMAZE) injection 50 mcg  Every 5 Minutes PRN,   Status:  Discontinued      04/02/19 1543    04/02/19 1543  labetalol (NORMODYNE,TRANDATE) injection 5 mg  Every 5 Minutes PRN,   Status:  Discontinued      04/02/19 1543    04/02/19 1543  hydrALAZINE (APRESOLINE) injection 5 mg  Every 10 Minutes PRN,   Status:  Discontinued      04/02/19 1543    04/02/19 1543  ipratropium-albuterol (DUO-NEB) nebulizer solution 3 mL  Once As Needed,   Status:  Discontinued      04/02/19 1543    04/02/19 1543  naloxone (NARCAN) injection 0.4 mg  As Needed,   Status:  Discontinued      04/02/19 1543    04/02/19 1543  ondansetron (ZOFRAN) injection 4 mg  Once As Needed,   Status:  Discontinued      04/02/19 1543    04/02/19 1543  promethazine (PHENERGAN) injection 6.25 mg  Once As Needed,   Status:  Discontinued      04/02/19 1543    04/02/19 1543  promethazine (PHENERGAN) injection 6.25 mg  Once As Needed,   Status:  Discontinued      04/02/19 1543    04/02/19 1543  promethazine (PHENERGAN) suppository 25 mg  Once As Needed,   Status:  Discontinued      04/02/19 1543    04/02/19 1543  promethazine (PHENERGAN) tablet 25 mg  Once As Needed,   Status:  Discontinued      04/02/19 1543    04/02/19 1543  meperidine (DEMEROL) injection 12.5 mg  Every 5 Minutes PRN,   Status:  Discontinued      04/02/19 1543    04/02/19 1543  Oxygen Therapy- Nasal Cannula; 2 LPM; Titrate for SPO2: equal to or greater than, 90%  Continuous,   Status:  Canceled      04/02/19 1542    04/02/19 1543  Place Sequential  Compression Device  Once      04/02/19 1542    04/02/19 1543  Maintain Sequential Compression Device  Continuous      04/02/19 1542    04/02/19 1530  EEG Monitoring Nonintracranial Surgery  Once      04/02/19 1055    04/02/19 1523  nicotine (NICODERM CQ) 21 MG/24HR patch 1 patch  Every 24 Hours Scheduled      04/02/19 1521    04/02/19 1521  HYDROcodone-acetaminophen (NORCO) 5-325 MG per tablet 1 tablet  Every 6 Hours PRN      04/02/19 1521    04/02/19 1521  Morphine sulfate (PF) injection 2 mg  Every 4 Hours PRN      04/02/19 1521    04/02/19 1506  Tissue Pathology Exam      04/02/19 1506    04/02/19 1437  TACHOSIL patch  As Needed,   Status:  Discontinued      04/02/19 1437    04/02/19 1345  niCARdipine (CARDENE) 20 mg in sodium chloride 0.9 % 200 mL (0.1 mg/mL) infusion  Once      04/02/19 1343    04/02/19 1330  bacitracin 50,000 Units in lactated ringers 500 mL irrigation  As Needed,   Status:  Discontinued      04/02/19 1407    04/02/19 1330  lidocaine (XYLOCAINE) 1 % injection  As Needed,   Status:  Discontinued      04/02/19 1408    04/02/19 1330  sodium chloride 0.9 % solution  As Needed,   Status:  Discontinued      04/02/19 1409    04/02/19 1330  sterile water irrigation solution  As Needed,   Status:  Discontinued      04/02/19 1409    04/02/19 1330  sodium chloride 1,000 mL with heparin (porcine) 10,000 Units mixture  As Needed,   Status:  Discontinued      04/02/19 1409    04/02/19 1106  ceFAZolin in dextrose (ANCEF) IVPB solution 2 g  Once      04/02/19 1104    04/02/19 1057  lactated ringers infusion  Continuous      04/02/19 1055    04/02/19 1057  famotidine (PEPCID) tablet 20 mg  Once,   Status:  Discontinued      04/02/19 1055    04/02/19 1057  cefuroxime (ZINACEF) 1.5 g/100 mL 0.9% NS IVPB (mbp)  Once,   Status:  Discontinued      04/02/19 1055    04/02/19 1056  ABO/Rh Specimen Verification  Once      04/02/19 1055    04/02/19 1056  Oxygen Therapy- Nasal Cannula; Titrate for SPO2: equal to or  greater than, 90%  Continuous,   Status:  Canceled      04/02/19 1055    04/02/19 1056  Pulse Oximetry, Continuous  Continuous,   Status:  Canceled      04/02/19 1055    04/02/19 1056  Notify Anesthesiologist with any acute changes in patient's condition.  Continuous,   Status:  Canceled      04/02/19 1055    04/02/19 1056  Notify Anesthesiologist for unrelieved pain.  Continuous,   Status:  Canceled     Comments:  Notify Anesthesiologist    04/02/19 1055    04/02/19 1056  Insert Peripheral IV  Once,   Status:  Canceled      04/02/19 1055    04/02/19 1056  Saline Lock & Maintain IV Access  Continuous,   Status:  Canceled      04/02/19 1055 04/02/19 1056  May take Beta Blocker from home with sip of water.  Once,   Status:  Canceled     Comments:  If patient currently taking Beta Blocker and has not taken within 24 hours    04/02/19 1055    04/02/19 1056  EEG (Intra-Op)  Once,   Status:  Canceled      04/02/19 1055    04/02/19 1056  EEG (Pre-Op)  Once      04/02/19 1055    04/02/19 1056  Follow Anesthesia Guidelines / Standing Orders  Once,   Status:  Canceled      04/02/19 1055    04/02/19 1055  Vital Signs - Per Anesthesia Protocol  As Needed,   Status:  Canceled      04/02/19 1055    04/02/19 1055  sodium chloride 0.9 % flush 3 mL  Every 12 Hours Scheduled,   Status:  Discontinued      04/02/19 1055    04/02/19 1055  sodium chloride 0.9 % flush 3-10 mL  As Needed,   Status:  Discontinued      04/02/19 1055    04/02/19 1055  lidocaine PF 1% (XYLOCAINE) injection 0.5 mL  Once As Needed      04/02/19 1055    Signed and Held  OR Potassium  STAT,   Status:  Canceled      Signed and Held    Signed and Held  famotidine (PEPCID) injection 20 mg  Once,   Status:  Canceled      Signed and Held    Signed and Held  ECG 12 Lead  Once,   Status:  Canceled     Comments:  If no ECG within the previous 6 months or any of the following: Heart/Valvular Disease; Previous Abnormal EKG; Diabetic; Renal Failure; Chest Pain;  Hypertension; Emphysema/Respiratory Disease - Read by Anesthesiologist    Signed and Held             Operative/Procedure Notes (all)      Dallin Quinn MD at 4/2/2019  2:02 PM  Version 1 of 1       Operative Report    Preop Diagnosis: Left internal carotid stenosis      Postoperative Diagnosis: Same      Procedure: Left internal carotid endarterectomy with pericardial patch closure and EEG monitoring        Surgeons: Dallin Quinn MD as surgeon.  Molina Lacey PA-C is the assistant        Indication: Patient with significant left-sided carotid stenosis documented by CT angiogram.  She understood the risk of stroke bleeding infection death and renal failure these risks were explained on multiple occasions and she agreed to proceed        Description: Supine position.  Sterile prep and drape.  Antibiotics given.  Incision made on the left neck anterior to the sternocleidomastoid and through the platysma.  Care was taken to identify not injure the hypoglossal and vagus nerves and heparin was given.  The internal/external and common carotid arteries were clamped and no deleterious EEG changes were encountered.  The artery was opened there was not only a calcific plaque but appeared to be a fresh hemorrhagic plaque.  It was endarterectomized to a smooth surface and the specimen sent to pathology.  A pericardial patch was sutured in place with Prolene brisk back bleeding was noted from the internal carotid the clamps were then sent released in the proper sequential fashion good hemostasis was obtained and the incision closed multiple layers of suture.  Blood loss less than 100 mL sponge and needle count reported as correct      Please note that portions of this note were completed with a voice recognition program. Efforts were made to edit the dictations, but occasionally words are mistranscribed.      Electronically signed by Dallin Quinn MD at 4/2/2019  3:06 PM          Physician Progress Notes (last 24  hours) (Notes from 4/2/2019  7:52 AM through 4/3/2019  7:52 AM)      Dallin Quinn MD at 4/3/2019  6:56 AM          CTS Progress Note       LOS: 1 day   Patient Care Team:  Jaylene Bradshaw MD as PCP - General (Internal Medicine)    Chief Complaint: Stenosis of left carotid artery    Vital Signs:  Temp:  [97.1 °F (36.2 °C)-98.1 °F (36.7 °C)] 97.9 °F (36.6 °C)  Heart Rate:  [] 63  Resp:  [13-22] 16  BP: ()/(40-94) 102/57  Arterial Line BP: ()/(39-62) 105/44    Physical Exam:  Tongue is midline.  Neck is satisfactory no neurologic deficits     Results:   Results from last 7 days   Lab Units 04/03/19  0359   WBC 10*3/mm3 14.84*   HEMOGLOBIN g/dL 11.7   HEMATOCRIT % 35.4   PLATELETS 10*3/mm3 279     Results from last 7 days   Lab Units 04/03/19  0359   SODIUM mmol/L 131*   POTASSIUM mmol/L 3.7   CHLORIDE mmol/L 105   CO2 mmol/L 20.0   BUN mg/dL 9   CREATININE mg/dL 0.79   GLUCOSE mg/dL 220*   CALCIUM mg/dL 9.2           Imaging Results (last 24 hours)     ** No results found for the last 24 hours. **          Assessment      Stenosis of left carotid artery    Essential hypertension    Coronary artery disease involving native coronary artery of native heart without angina pectoris    Dyslipidemia    Mucopurulent chronic bronchitis (CMS/HCC)        Plan   Wean and discontinue Cardene for systolic blood pressure less than 140.  Ambulate    Please note that portions of this note were completed with a voice recognition program. Efforts were made to edit the dictations, but occasionally words are mistranscribed.    Dallin Quinn MD  04/03/19  6:57 AM        Electronically signed by Dallin Quinn MD at 4/3/2019  6:57 AM     Nitza Ly MD at 4/2/2019  6:34 PM          Critical Care Note     LOS: 0 days   Patient Care Team:  Jaylene Bradshaw MD as PCP - General (Internal Medicine)    Chief Complaint/Reason for visit:      Left carotid  "stenosis  CAD  Hypertension  Dyslipidemia  Chronic bronchitis      Subjective     Interval History:     58-year-old woman found to have a 75% stenosis of her left internal carotid artery.  Today she underwent carotid endarterectomy without immediate complications.  She is requiring a Cardene drip for high blood pressure.  She denies headache, weakness, numbness, difficulty speaking.  Risk factors include tobacco use, hypertension, dyslipidemia.  She denies diabetes.    History taken from: patient    Review of Systems:    All systems were reviewed and negative except as noted in subjective.  She admits to a chronic cough in the mornings.  She uses 3 inhalers, albuterol, Spiriva, Breo.  She has never been hospitalized for COPD.  She does not require supplemental oxygen.  She had 3 stents placed in 2016 after a heart attack.  She denies angina, orthopnea, edema, palpitations.  She does consume 2-3 beers daily.  She denies hematemesis, tarry stools, nausea or vomiting.  She has a lot of arthritis in her left knee.  She is a previous surgical repair of a fracture.    Medical history, surgical history, social history, family history reviewed    Objective     Intake/Output:    Intake/Output Summary (Last 24 hours) at 4/2/2019 1837  Last data filed at 4/2/2019 1451  Gross per 24 hour   Intake 900 ml   Output --   Net 900 ml       Nutrition:  Diet Regular; Consistent Carbohydrate, Cardiac    Infusions:    lactated ringers 9 mL/hr Last Rate: 9 mL/hr (04/02/19 1142)   niCARdipine 5-15 mg/hr Last Rate: 5 mg/hr (04/02/19 1800)   phenylephrine (SAHRA-SYNEPHRINE) 50 mg/250 (0.2 mg/mL) infusion 0.5-3 mcg/kg/min Last Rate: Stopped (04/02/19 1800)   sodium chloride 30 mL/hr Last Rate: 30 mL/hr (04/02/19 1709)         Telemetry: Sinus rhythm             Vital Signs  Blood pressure 112/63, pulse 84, temperature 97.8 °F (36.6 °C), temperature source Axillary, resp. rate 16, height 160 cm (63\"), weight 54.8 kg (120 lb 13 oz), SpO2 92 " %.    Physical Exam:  General Appearance:   Thin older woman in no acute distress   Head:   Normocephalic, atraumatic   Eyes:          Conjunctiva pink.  No jaundice.  Pupils equal and reactive to light   Ears:     Throat:  Oral mucosa moist.  Dentures in place.  No exudate   Neck:  Left neck dressing intact with minimal dried blood.   Back:      Lungs:    Symmetric chest expansion without wheeze or rhonchi    Heart:   Regular rate and rhythm, S1, S2 auscultated, no murmur   Abdomen:    Flat, bowel sounds present, nontender   Rectal:   Deferred   Extremities:  No edema or cyanosis.  Cannot fully straighten her left leg. right radial arterial line in place.  Hand with good capillary refill   Pulses:    Skin:  Warm and dry   Lymph nodes:    Neurologic:  Alert and oriented.  Speech fluent.  Face symmetric.  Tongue midline.  No motor drift upper or lower extremities      Results Review:     I reviewed the patient's new clinical results.   Results from last 7 days   Lab Units 04/01/19  1311   SODIUM mmol/L 134   POTASSIUM mmol/L 4.1   CHLORIDE mmol/L 103   CO2 mmol/L 24.0   BUN mg/dL 9   CREATININE mg/dL 0.74   CALCIUM mg/dL 10.2   GLUCOSE mg/dL 98     Results from last 7 days   Lab Units 04/01/19  1311   WBC 10*3/mm3 11.37*   HEMOGLOBIN g/dL 14.0   HEMATOCRIT % 42.6   PLATELETS 10*3/mm3 292         No results found for: BLOODCX  No results found for: URINECX    I reviewed the patient's new imaging including images and reports.       FINDINGS: PA and lateral views of the chest reveal cardiac and  mediastinal silhouettes within normal limits.  Underlying chronic and  emphysematous changes seen within the lung fields bilaterally. No focal  parenchymal opacification present. No pleural effusion or pneumothorax.  Degenerative changes seen within the spine. Vascular calcification seen  within the thoracic aorta. No pleural effusion or pneumothorax.         IMPRESSION:  Chronic changes seen within the lung fields with no  evidence  of acute parenchymal disease.     D:  04/01/2019  All medications reviewed.     [START ON 4/3/2019] amLODIPine 10 mg Oral Daily   [START ON 4/3/2019] aspirin 81 mg Oral Daily   ceFAZolin 2 g Intravenous Q8H   [START ON 4/3/2019] clopidogrel 75 mg Oral Daily   [START ON 4/3/2019] famotidine 20 mg Oral Daily   gabapentin 300 mg Oral Q12H   niCARdipine (CARDENE) infusion 3-15 mg/hr Intravenous Once   nicotine 1 patch Transdermal Q24H   [START ON 4/3/2019] rosuvastatin 20 mg Oral Daily   sodium chloride 3 mL Intravenous Q12H         Assessment/Plan       Stenosis of left carotid artery    Essential hypertension    Coronary artery disease involving native coronary artery of native heart without angina pectoris    Dyslipidemia    Mucopurulent chronic bronchitis (CMS/HCC)    #1 left carotid stenosis status post carotid endarterectomy without significant difficulties.  She is neurologically intact    #2 hypertension currently on Cardene drip.  She takes Norvasc at home    #3 coronary disease with previous stents to the LAD x2, circumflex x1 in 2016.  Currently without angina    #4 chronic bronchitis she takes an inhaled corticosteroid,LABA, anticholinergic and and short acting rescue inhaler at home.  She has chronic mucoid secretions.  She does not require supplemental oxygen.  She states that she has no intention of smoking cessation    #5 dyslipidemia for which she takes Crestor at home    PLAN:  Monitor neurologic exam  Blood pressure control with Cardene and transition to Norvasc  Nebulized bronchodilators, inhaled corticosteroids  Monitor surgical site for bleeding  Restart statin  Restart Plavix when okay with surgery  Initiate proton pump inhibitor  SCDS  May be candidate for outpatient low dose screening CT chest      Nitza Ly MD  04/02/19  6:37 PM      Time: 25min  I personally provided care to this critically ill patient as documented above.  Critical care time does not include time spent  on separately billed procedures.  Non of my critical care time was concurrent with other critical care providers.     Electronically signed by Nitza Ly MD at 4/2/2019  6:46 PM       Consult Notes (last 24 hours) (Notes from 4/2/2019  7:52 AM through 4/3/2019  7:52 AM)     No notes of this type exist for this encounter.

## 2019-04-03 NOTE — PROGRESS NOTES
CTS Progress Note       LOS: 1 day   Patient Care Team:  Jaylene Bradshaw MD as PCP - General (Internal Medicine)    Chief Complaint: Stenosis of left carotid artery    Vital Signs:  Temp:  [97.1 °F (36.2 °C)-98.1 °F (36.7 °C)] 97.9 °F (36.6 °C)  Heart Rate:  [] 63  Resp:  [13-22] 16  BP: ()/(40-94) 102/57  Arterial Line BP: ()/(39-62) 105/44    Physical Exam:  Tongue is midline.  Neck is satisfactory no neurologic deficits     Results:   Results from last 7 days   Lab Units 04/03/19  0359   WBC 10*3/mm3 14.84*   HEMOGLOBIN g/dL 11.7   HEMATOCRIT % 35.4   PLATELETS 10*3/mm3 279     Results from last 7 days   Lab Units 04/03/19  0359   SODIUM mmol/L 131*   POTASSIUM mmol/L 3.7   CHLORIDE mmol/L 105   CO2 mmol/L 20.0   BUN mg/dL 9   CREATININE mg/dL 0.79   GLUCOSE mg/dL 220*   CALCIUM mg/dL 9.2           Imaging Results (last 24 hours)     ** No results found for the last 24 hours. **          Assessment      Stenosis of left carotid artery    Essential hypertension    Coronary artery disease involving native coronary artery of native heart without angina pectoris    Dyslipidemia    Mucopurulent chronic bronchitis (CMS/HCC)        Plan   Wean and discontinue Cardene for systolic blood pressure less than 140.  Ambulate    Please note that portions of this note were completed with a voice recognition program. Efforts were made to edit the dictations, but occasionally words are mistranscribed.    Dallin Quinn MD  04/03/19  6:57 AM

## 2019-04-03 NOTE — PROGRESS NOTES
"Intensive Care Follow-up     Hospital:  LOS: 1 day   Ms. Shannon Chan, 58 y.o. female is followed for:   Stenosis of left carotid artery   With postoperative management of hypertension     Subjective   Interval History:  The chart has been reviewed.  The patient has had good pain control overnight.  She has not required Cardene since late last evening.  There is no focal weakness or visual change.    The patient's past medical, surgical and social history were reviewed and updated in Epic as appropriate.        Objective     Infusions:    lactated ringers 9 mL/hr Last Rate: 9 mL/hr (04/02/19 1142)   niCARdipine 5-15 mg/hr Last Rate: Stopped (04/02/19 2217)   phenylephrine (SAHRA-SYNEPHRINE) 50 mg/250 (0.2 mg/mL) infusion 0.5-3 mcg/kg/min Last Rate: Stopped (04/02/19 1800)   sodium chloride 30 mL/hr Last Rate: 30 mL/hr (04/02/19 1709)     Medications:    amLODIPine 10 mg Oral Daily   aspirin 81 mg Oral Daily   budesonide-formoterol 2 puff Inhalation BID - RT   clopidogrel 75 mg Oral Daily   famotidine 20 mg Oral BID   gabapentin 300 mg Oral Q12H   ipratropium-albuterol 3 mL Nebulization 4x Daily - RT   niCARdipine (CARDENE) infusion 3-15 mg/hr Intravenous Once   nicotine 1 patch Transdermal Q24H   rosuvastatin 20 mg Oral Daily   sodium chloride 3 mL Intravenous Q12H       Vital Sign Min/Max for last 24 hours  Temp  Min: 97.1 °F (36.2 °C)  Max: 98.1 °F (36.7 °C)   BP  Min: 78/46  Max: 138/79   Pulse  Min: 59  Max: 118   Resp  Min: 13  Max: 22   SpO2  Min: 84 %  Max: 99 %   Flow (L/min)  Min: 2  Max: 4       Input/Output for last 24 hour shift  04/02 0701 - 04/03 0700  In: 2210.5 [P.O.:480; I.V.:1630.5]  Out: 1100 [Urine:1100]      Objective:  General Appearance:  Comfortable, well-appearing and in no acute distress.    Vital signs: (most recent): Blood pressure 109/54, pulse 92, temperature 97.9 °F (36.6 °C), temperature source Axillary, resp. rate 18, height 160 cm (63\"), weight 54.8 kg (120 lb 13 oz), SpO2 95 %.  " No fever.    HEENT: (The left side of the neck is dressed and dry)    Lungs:  Normal effort and normal respiratory rate.  Breath sounds clear to auscultation.  She is not in respiratory distress.  No decreased breath sounds.    Heart: Normal rate.  Regular rhythm.  S1 normal and S2 normal.  No murmur.   Abdomen: Abdomen is soft.  Bowel sounds are normal.   There is no abdominal tenderness.     Extremities: Normal range of motion.  There is no deformity.    Neurological: Patient is alert and oriented to person, place and time.    Pupils:  Pupils are equal, round, and reactive to light.  Pupils are equal.   Skin:  Warm.              Results from last 7 days   Lab Units 04/03/19  0359 04/01/19  1311   WBC 10*3/mm3 14.84* 11.37*   HEMOGLOBIN g/dL 11.7 14.0   PLATELETS 10*3/mm3 279 292     Results from last 7 days   Lab Units 04/03/19  0359 04/01/19  1311   SODIUM mmol/L 131* 134   POTASSIUM mmol/L 3.7 4.1   CO2 mmol/L 20.0 24.0   BUN mg/dL 9 9   CREATININE mg/dL 0.79 0.74   GLUCOSE mg/dL 220* 98     Estimated Creatinine Clearance: 68 mL/min (by C-G formula based on SCr of 0.79 mg/dL).            I reviewed the patient's results and images.     Assessment/Plan   Impression        Stenosis of left carotid artery    Essential hypertension    Coronary artery disease involving native coronary artery of native heart without angina pectoris    Dyslipidemia    Mucopurulent chronic bronchitis (CMS/HCC)    Carotid stenosis, left       Plan        Continue blood pressure control.  Pain control as needed.  Mobilize as tolerated.  Recheck sodium in the morning.      Plan of care and goals reviewed with mulitdisciplinary/antibiotic stewardship team during rounds.   I discussed the patient's findings and my recommendations with patient and nursing staff       Twan Lyons MD, [facility]  Pulmonology and Critical Care Medicine

## 2019-04-03 NOTE — PROGRESS NOTES
Clinical Nutrition     Multidisciplinary Rounds    Time: 20min  Patient Name: Shannon Chan  Date of Encounter: 04/03/19 9:26 AM  MRN: [id number]  Admission date: 4/2/2019     BRETT MCDONOUGH to continue to follow per protocol.     Current diet: Diet Regular; Consistent Carbohydrate, Cardiac    Follow treatment plan  Care plan reviewed    Follow up:   Per protocol      Kelle Adams RD  9:26 AM

## 2019-04-04 VITALS
BODY MASS INDEX: 21.41 KG/M2 | DIASTOLIC BLOOD PRESSURE: 86 MMHG | WEIGHT: 120.81 LBS | RESPIRATION RATE: 18 BRPM | OXYGEN SATURATION: 97 % | HEIGHT: 63 IN | SYSTOLIC BLOOD PRESSURE: 146 MMHG | TEMPERATURE: 97.8 F | HEART RATE: 77 BPM

## 2019-04-04 LAB
CYTO UR: NORMAL
LAB AP CASE REPORT: NORMAL
LAB AP CLINICAL INFORMATION: NORMAL
PATH REPORT.FINAL DX SPEC: NORMAL
PATH REPORT.GROSS SPEC: NORMAL

## 2019-04-04 PROCEDURE — 99024 POSTOP FOLLOW-UP VISIT: CPT | Performed by: PHYSICIAN ASSISTANT

## 2019-04-04 PROCEDURE — 99024 POSTOP FOLLOW-UP VISIT: CPT | Performed by: THORACIC SURGERY (CARDIOTHORACIC VASCULAR SURGERY)

## 2019-04-04 RX ORDER — NICOTINE 21 MG/24HR
1 PATCH, TRANSDERMAL 24 HOURS TRANSDERMAL
Qty: 14 PATCH | Refills: 0 | Status: SHIPPED | OUTPATIENT
Start: 2019-04-04 | End: 2022-03-03

## 2019-04-04 RX ADMIN — NICOTINE 1 PATCH: 21 PATCH, EXTENDED RELEASE TRANSDERMAL at 08:13

## 2019-04-04 RX ADMIN — GABAPENTIN 300 MG: 300 CAPSULE ORAL at 08:16

## 2019-04-04 RX ADMIN — HYDROCODONE BITARTRATE AND ACETAMINOPHEN 1 TABLET: 5; 325 TABLET ORAL at 02:26

## 2019-04-04 RX ADMIN — CLOPIDOGREL BISULFATE 75 MG: 75 TABLET ORAL at 08:16

## 2019-04-04 RX ADMIN — AMLODIPINE BESYLATE 10 MG: 10 TABLET ORAL at 08:14

## 2019-04-04 RX ADMIN — ASPIRIN 81 MG: 81 TABLET, COATED ORAL at 08:16

## 2019-04-04 RX ADMIN — ROSUVASTATIN CALCIUM 20 MG: 20 TABLET, FILM COATED ORAL at 08:16

## 2019-04-04 RX ADMIN — FAMOTIDINE 20 MG: 20 TABLET ORAL at 08:16

## 2019-04-04 RX ADMIN — HYDROCODONE BITARTRATE AND ACETAMINOPHEN 1 TABLET: 5; 325 TABLET ORAL at 09:40

## 2019-04-04 NOTE — DISCHARGE SUMMARY
"CTS Discharge Summary    Patient Care Team:  Jaylene Bradshaw MD as PCP - General (Internal Medicine)  Consults:   Consults     No orders found from 3/4/2019 to 4/3/2019.          Date of Admission: 4/2/2019  9:02 AM  Date of Discharge:  4/4/2019    Discharge Diagnosis  Past Medical History:   Diagnosis Date   • Anxiety    • Arthritis    • Coronary artery disease    • Depression    • Dyslipidemia    • GERD (gastroesophageal reflux disease)    • Hyperlipidemia    • Hypertension    • Myocardial infarction (CMS/HCC)    • Wears dentures     UPPER PLATE    • Wears glasses      Stenosis of left carotid artery [I65.22]  Carotid stenosis, left [I65.22]     Procedures Performed 4/2/2019 Dallin Quinn MD    Left internal carotid endarterectomy with pericardial patch closure and EEG monitoring      History of Present Illness  Patient is a 58 y.o. female .This is a patient who has a long smoking history beginning at age 12 years.  She has smoked up to two packs a day and continues to smoke.  She has had what she states is a \"previous heart attack\" and she says that she has had a heart catheterization in the last two yeas and she was actually referred to me by her cardiologist with a CTA demonstrating 75% left-sided carotid stenosis.  These are also very small arteries visualized on the scan.  She denies strokes, seizures, TIAs, amaurosis fugax, dysarthria or dysphagia          Hospital Course  Admitted on 4/2/2019 and underwent the above-mentioned surgical procedures.  Following the procedure patient was transferred to the recovery room extubated.  Following PACU criteria patient was transferred to the ICU.  While in ICU the patient was followed by the hospital intensivist service per hospital protocol. Patient was closely postoperative complications.  Blood pressure tightly controled.  Cardiology consult obtained to help with the treatment evaluation of patient's hypertension. Followed on daily rounds.  " Postoperatively patient found to be doing well.  Neurologically intact.  Following rounds on postoperative day 2 the patient was found to be doing well.  Still neurologically intact.  Surgical site satisfactory.  At this point it was felt that the patient could be safely discharged home.  Follow-up appointments made.      Discharge Medications     Discharge Medications      New Medications      Instructions Start Date   nicotine 21 MG/24HR patch  Commonly known as:  NICODERM CQ  Notes to patient:  Next dose due tomorrow   1 patch, Transdermal, Every 24 Hours Scheduled         Continue These Medications      Instructions Start Date   amLODIPine 10 MG tablet  Commonly known as:  NORVASC  Notes to patient:  Next dose due tomorrow   10 mg, Oral, Daily      aspirin 81 MG EC tablet  Notes to patient:  Next dose due tomorrow   81 mg, Oral, Daily      clopidogrel 75 MG tablet  Commonly known as:  PLAVIX  Notes to patient:  Next dose due tomorrow    75 mg, Oral, Daily      famotidine 20 MG tablet  Commonly known as:  PEPCID  Notes to patient:  Next dose due tomorrow    20 mg, Oral, Daily      gabapentin 300 MG capsule  Commonly known as:  NEURONTIN  Notes to patient:  Next dose due this evening   300 mg, Oral, 2 Times Daily      rosuvastatin 20 MG tablet  Commonly known as:  CRESTOR  Notes to patient:  Next dose due tomorrow   20 mg, Oral, Daily             Discharge Diet: Regular cardiac diet    Activity at Discharge: No driving for 3 weeks and/or released in follow up appointment and no longer taking Narcotics for pain control. Lifting restrictions of 10 lbs. Return to work when cleared in follow up appointment.     Follow-up Appointments  Dr. Quinn's office will contact the patient regarding future follow-up appointment date and time.     WOUND CARE:monitor daily. Keep clean and dry. Call dr Quinn's office with questions or concerns    MAYNOR Neely  04/04/19  11:08 AM

## 2019-04-04 NOTE — PLAN OF CARE
Problem: Patient Care Overview  Goal: Plan of Care Review  Outcome: Ongoing (interventions implemented as appropriate)   04/04/19 0952   Coping/Psychosocial   Plan of Care Reviewed With patient   Plan of Care Review   Progress improving   OTHER   Outcome Summary VSS overnight. Minimal c/o pain. Pt very eager to be d/c'd this am.

## 2019-04-04 NOTE — PROGRESS NOTES
Clinical Nutrition     Multidisciplinary Rounds    Time: 5min  Patient Name: Shannon Chan  Date of Encounter: 04/04/19 10:16 AM  MRN: [id number]  Admission date: 4/2/2019       BRETT MCDONOUGH to continue to follow per protocol.     Plan to dc    Current diet: Diet Regular; Consistent Carbohydrate, Cardiac    Intervention:  Follow treatment plan  Care plan reviewed    Follow up:   Per protocol      Kelle Adams RD  10:16 AM

## 2019-04-04 NOTE — PROGRESS NOTES
CTS Progress Note       LOS: 2 days   Patient Care Team:  Jaylene Bradshaw MD as PCP - General (Internal Medicine)    Chief Complaint: Stenosis of left carotid artery    Vital Signs:  Temp:  [97.4 °F (36.3 °C)-98.4 °F (36.9 °C)] 98.4 °F (36.9 °C)  Heart Rate:  [] 66  Resp:  [16-18] 16  BP: ()/(39-99) 97/57  Arterial Line BP: ()/(42-53) 102/45    Physical Exam: Tongue is midline.  Neck is satisfactory no neurologic deficits       Results:   Results from last 7 days   Lab Units 04/03/19  0359   WBC 10*3/mm3 14.84*   HEMOGLOBIN g/dL 11.7   HEMATOCRIT % 35.4   PLATELETS 10*3/mm3 279     Results from last 7 days   Lab Units 04/03/19  0359   SODIUM mmol/L 131*   POTASSIUM mmol/L 3.7   CHLORIDE mmol/L 105   CO2 mmol/L 20.0   BUN mg/dL 9   CREATININE mg/dL 0.79   GLUCOSE mg/dL 220*   CALCIUM mg/dL 9.2           Imaging Results (last 24 hours)     ** No results found for the last 24 hours. **          Assessment      Stenosis of left carotid artery    Essential hypertension    Coronary artery disease involving native coronary artery of native heart without angina pectoris    Dyslipidemia    Mucopurulent chronic bronchitis (CMS/HCC)    Carotid stenosis, left        Plan   Discontinue neck dressing and neck drain.  Discharge home today.  Discharge with possible oral antibiotics if intensivist thinks they may be beneficial for her underlying bronchitis and recent surgery    Please note that portions of this note were completed with a voice recognition program. Efforts were made to edit the dictations, but occasionally words are mistranscribed.    Dallin Quinn MD  04/04/19  4:29 AM

## 2019-04-04 NOTE — PLAN OF CARE
Problem: Patient Care Overview  Goal: Plan of Care Review  Outcome: Ongoing (interventions implemented as appropriate)   04/04/19 0406   Coping/Psychosocial   Plan of Care Reviewed With patient   Plan of Care Review   Progress improving   OTHER   Outcome Summary Stable VSS. Neurologically intact, w no deficits. Ambulates with asst standby , ambulated 1800 ft. Left neck dressing saturated with old bloody drainage, nothing fresh. Expects to be DC'd home today.        Problem: Fall Risk (Adult)  Goal: Absence of Fall  Outcome: Outcome(s) achieved Date Met: 04/04/19 04/04/19 0406   Fall Risk (Adult)   Absence of Fall achieves outcome       Problem: Carotid Endarterectomy (Adult)  Goal: Signs and Symptoms of Listed Potential Problems Will be Absent, Minimized or Managed (Carotid Endarterectomy)  Outcome: Ongoing (interventions implemented as appropriate)   04/04/19 0406   Goal/Outcome Evaluation   Problems Assessed (Carotid Endarterectomy) all   Problems Present (Carotid Endarterect) none

## 2019-04-05 ENCOUNTER — READMISSION MANAGEMENT (OUTPATIENT)
Dept: CALL CENTER | Facility: HOSPITAL | Age: 59
End: 2019-04-05

## 2019-04-05 NOTE — OUTREACH NOTE
Prep Survey      Responses   Facility patient discharged from?  [facility]   Is patient eligible?  Yes   Discharge diagnosis  Stenosis of left carotid artery, s/p left internal carotid endarterectomy with pericardial patch closure   Does the patient have one of the following disease processes/diagnoses(primary or secondary)?  General Surgery   Does the patient have Home health ordered?  No   Is there a DME ordered?  No   Comments regarding appointments  Office will call   Prep survey completed?  Yes          Bridget Cormier RN

## 2019-04-07 ENCOUNTER — READMISSION MANAGEMENT (OUTPATIENT)
Dept: CALL CENTER | Facility: HOSPITAL | Age: 59
End: 2019-04-07

## 2019-04-07 NOTE — OUTREACH NOTE
General Surgery Week 1 Survey      Responses   Facility patient discharged from?  [facility]   Does the patient have one of the following disease processes/diagnoses(primary or secondary)?  General Surgery   Is there a successful TCM telephone encounter documented?  No   Week 1 attempt successful?  Yes   Call start time  1341   Call end time  1349   Is patient permission given to speak with other caregiver?  Yes   Meds reviewed with patient/caregiver?  Yes   Is the patient having any side effects they believe may be caused by any medication additions or changes?  No   Does the patient have all medications related to this admission filled (includes all antibiotics, pain medications, etc.)  No   Prescription comments  patient was to get the medication from the in house pharmacy and she said she did not get any medication   Is the patient taking all medications as directed (includes completed medication regime)?  No   What is preventing the patient from taking all medications as directed?  Other   Medication comments  will be emailing case managment   Has home health visited the patient within 72 hours of discharge?  N/A   Did the patient receive a copy of their discharge instructions?  Yes   Nursing interventions  Reviewed instructions with patient   What is the patient's perception of their health status since discharge?  Same   Is the patient /caregiver able to teach back basic post-op care?  Keep incision areas clean,dry and protected, Take showers only when approved by MD-sponge bathe until then   Is the patient/caregiver able to teach back signs and symptoms of incisional infection?  Increased redness, swelling or pain at the incisonal site, Increased drainage or bleeding, Incisional warmth, Pus or odor from incision, Fever   Is the patient/caregiver able to teach back steps to recovery at home?  Rest and rebuild strength, gradually increase activity   If the patient is a current smoker, are they able to teach  back resources for cessation?  [id number]   Is the patient/caregiver able to teach back the hierarchy of who to call/visit for symptoms/problems? PCP, Specialist, Home health nurse, Urgent Care, ED, 911  Yes   Additional teach back comments  will try to get nicotine patches as ordered   Week 1 call completed?  Yes   Wrap up additional comments  did not get medication, c/o sore throat is using salt water gargles with some relief          Moira Moctezuma RN

## 2019-04-16 ENCOUNTER — READMISSION MANAGEMENT (OUTPATIENT)
Dept: CALL CENTER | Facility: HOSPITAL | Age: 59
End: 2019-04-16

## 2019-04-16 NOTE — OUTREACH NOTE
General Surgery Week 2 Survey      Responses   Facility patient discharged from?  [facility]   Does the patient have one of the following disease processes/diagnoses(primary or secondary)?  General Surgery   Week 2 attempt successful?  No   Unsuccessful attempts  Attempt 1          Jeane Bernal RN

## 2019-04-18 ENCOUNTER — READMISSION MANAGEMENT (OUTPATIENT)
Dept: CALL CENTER | Facility: HOSPITAL | Age: 59
End: 2019-04-18

## 2019-04-18 NOTE — OUTREACH NOTE
General Surgery Week 2 Survey      Responses   Facility patient discharged from?  [facility]   Does the patient have one of the following disease processes/diagnoses(primary or secondary)?  General Surgery   Call start time  0939   Call end time  0944   Meds reviewed with patient/caregiver?  Yes   Is the patient taking all medications as directed (includes completed medication regime)?  Yes   Medication comments  still has not gotten her patch   Has the patient kept scheduled appointments due by today?  No   What is preventing the patient from keeping their appointments?  Doesn't understand importance   Nursing Interventions  Advised to reschedule appointment   Comments  advised to call the  surgeon  and obtian an appointment, she stated she has the number   What is the patient's perception of their health status since discharge?  Improving   Nursing interventions  Advised patient to call provider [advised to call  her surgeon for an appointment as a follow up]   Additional teach back comments  -- [patient is still smoking has not gottent her Nictoine patches]   Week 2 call completed?  Yes   Wrap up additional comments  -- [stated incision has healed nicely, not gotten her medication to stop smoking and is still smoking]          Moira Moctezuma RN

## 2019-05-23 ENCOUNTER — OFFICE VISIT (OUTPATIENT)
Dept: CARDIAC SURGERY | Facility: CLINIC | Age: 59
End: 2019-05-23

## 2019-05-23 VITALS
BODY MASS INDEX: 20.3 KG/M2 | HEART RATE: 80 BPM | HEIGHT: 63 IN | DIASTOLIC BLOOD PRESSURE: 70 MMHG | WEIGHT: 114.6 LBS | SYSTOLIC BLOOD PRESSURE: 130 MMHG | OXYGEN SATURATION: 90 %

## 2019-05-23 DIAGNOSIS — Z98.890 S/P CAROTID ENDARTERECTOMY: ICD-10-CM

## 2019-05-23 DIAGNOSIS — I65.22 CAROTID STENOSIS, LEFT: Primary | ICD-10-CM

## 2019-05-23 PROCEDURE — 99024 POSTOP FOLLOW-UP VISIT: CPT | Performed by: THORACIC SURGERY (CARDIOTHORACIC VASCULAR SURGERY)

## 2019-05-23 RX ORDER — HYDROCODONE BITARTRATE AND ACETAMINOPHEN 7.5; 325 MG/1; MG/1
TABLET ORAL
Refills: 0 | COMMUNITY
Start: 2019-05-18 | End: 2022-03-03

## 2019-05-23 NOTE — PROGRESS NOTES
05/23/2019  Patient Information  Shannon Desai Day                                                                                          [address]    [place] [address]   1960  'PCP/Referring Physician'  Jaylene Bradshaw MD  [phone]  No ref. provider found    Chief Complaint   Patient presents with   • Post-op     S/P LEFT CEA. PT STATES THAT WHEN SHE CHEWS HER LEFT SIDE JAW HURTS. PT STATES SHE JUST FEELS LAZY CONSTANTLY.   • CAROTID STENOSIS       History of Present Illness:   This patient on whom I performed a recent left carotid endarterectomy.  At this time, her tongue is midline.  She has no focal motor or sensory neurologic deficits.  Unfortunately, she still continues to smoke.      Patient Active Problem List   Diagnosis   • Stenosis of left carotid artery   • Essential hypertension   • Coronary artery disease involving native coronary artery of native heart without angina pectoris   • Dyslipidemia   • Mucopurulent chronic bronchitis (CMS/HCC)   • Carotid stenosis, left   • S/P carotid endarterectomy     Past Medical History:   Diagnosis Date   • Anxiety    • Arthritis    • Coronary artery disease    • Depression    • Dyslipidemia    • GERD (gastroesophageal reflux disease)    • Hyperlipidemia    • Hypertension    • Myocardial infarction (CMS/HCC)    • Wears dentures     UPPER PLATE    • Wears glasses      Past Surgical History:   Procedure Laterality Date   • CARDIAC CATHETERIZATION  2016 [place]   • CAROTID ENDARTERECTOMY Left 4/2/2019    Procedure: CAROTID ENDARTERECTOMY WITH EEG LEFT;  Surgeon: Dallin Quinn MD;  Location: [facility];  Service: Vascular   • CORONARY STENT PLACEMENT  2016    LADx2,Circx1 [place]   • KNEE SURGERY Left     fracture repair   • TUBAL ABDOMINAL LIGATION         Current Outpatient Medications:   •  amLODIPine (NORVASC) 10 MG tablet, Take 10 mg by mouth Daily., Disp: , Rfl: 3  •  aspirin 81 MG EC tablet, Take 81 mg by mouth Daily., Disp: ,  "Rfl: 3  •  clopidogrel (PLAVIX) 75 MG tablet, Take 75 mg by mouth Daily., Disp: , Rfl: 3  •  famotidine (PEPCID) 20 MG tablet, Take 20 mg by mouth Daily., Disp: , Rfl: 2  •  gabapentin (NEURONTIN) 300 MG capsule, Take 300 mg by mouth 2 (Two) Times a Day., Disp: , Rfl: 3  •  HYDROcodone-acetaminophen (NORCO) 7.5-325 MG per tablet, TAKE ONE TABLET BY MOUTH THREE TIMES A DAY FD: 05/18/19, Disp: , Rfl: 0  •  nicotine (NICODERM CQ) 21 MG/24HR patch, Place 1 patch on the skin as directed by provider Daily., Disp: 14 patch, Rfl: 0  •  rosuvastatin (CRESTOR) 20 MG tablet, Take 20 mg by mouth Daily., Disp: , Rfl: 3  Allergies   Allergen Reactions   • Percocet [Oxycodone-Acetaminophen] Nausea And Vomiting     N/V     Social History     Socioeconomic History   • Marital status: Single     Spouse name: Not on file   • Number of children: 0   • Years of education: Not on file   • Highest education level: Not on file   Occupational History   • Occupation: unemployed      Comment: applied for disability   Tobacco Use   • Smoking status: Current Every Day Smoker     Packs/day: 0.50     Years: 46.00     Pack years: 23.00     Types: Cigarettes   • Smokeless tobacco: Never Used   • Tobacco comment: started smoking at age 12   Substance and Sexual Activity   • Alcohol use: Yes     Alcohol/week: 12.6 oz     Types: 21 Cans of beer per week     Frequency: Never   • Drug use: No   • Sexual activity: Defer   Social History Narrative    L[facility] in [facility]     Family History   Problem Relation Age of Onset   • Other Mother         brain tumor   • Alcohol abuse Father    • Cirrhosis Father      ROS  Vitals:    05/23/19 0825   BP: 130/70   BP Location: Right arm   Patient Position: Sitting   Pulse: 80   SpO2: 90%   Weight: 52 kg (114 lb 9.6 oz)   Height: 160 cm (63\")      Physical Exam   NECK:  The incision is healing well.    NEURO:  The tongue is midline.  There are no neurologic deficits.    Assessment/Plan:   Progress " following left-sided carotid endarterectomy.  Unfortunately, she is still continuing to smoke cigarettes and we discussed this.  Her contralateral carotid has only 50% narrowing on the right side.  I have made arrangements for a duplex scan to be done in Dr. Macario's office in 1 year and I will see her back in follow-up.    Patient Active Problem List   Diagnosis   • Stenosis of left carotid artery   • Essential hypertension   • Coronary artery disease involving native coronary artery of native heart without angina pectoris   • Dyslipidemia   • Mucopurulent chronic bronchitis (CMS/HCC)   • Carotid stenosis, left   • S/P carotid endarterectomy     CC: MD Vicky Oropeza, , editing for Dallin Quinn M.D.    I, Dallin Quinn MD, have read and agree with the editing done by Vicky Lopez, .

## 2020-07-13 ENCOUNTER — TELEPHONE (OUTPATIENT)
Dept: CARDIAC SURGERY | Facility: CLINIC | Age: 60
End: 2020-07-13

## 2020-09-28 ENCOUNTER — TELEPHONE (OUTPATIENT)
Dept: CARDIAC SURGERY | Facility: CLINIC | Age: 60
End: 2020-09-28

## 2020-09-28 NOTE — TELEPHONE ENCOUNTER
2 recall letters have been mailed to the pt and returned to sender. Tried to call the pt to confirm new address and discuss f/up but couldn't reach by phone, no vm option.

## 2021-12-27 ENCOUNTER — OFFICE VISIT (OUTPATIENT)
Dept: CARDIAC SURGERY | Facility: CLINIC | Age: 61
End: 2021-12-27

## 2021-12-27 VITALS
DIASTOLIC BLOOD PRESSURE: 80 MMHG | TEMPERATURE: 97.7 F | SYSTOLIC BLOOD PRESSURE: 130 MMHG | HEIGHT: 62 IN | HEART RATE: 69 BPM | BODY MASS INDEX: 19.88 KG/M2 | OXYGEN SATURATION: 91 % | WEIGHT: 108 LBS

## 2021-12-27 DIAGNOSIS — I70.213 ATHEROSCLEROSIS OF NATIVE ARTERY OF BOTH LOWER EXTREMITIES WITH INTERMITTENT CLAUDICATION (HCC): Primary | ICD-10-CM

## 2021-12-27 DIAGNOSIS — I73.9 PAD (PERIPHERAL ARTERY DISEASE) (HCC): Primary | ICD-10-CM

## 2021-12-27 PROCEDURE — 99214 OFFICE O/P EST MOD 30 MIN: CPT | Performed by: THORACIC SURGERY (CARDIOTHORACIC VASCULAR SURGERY)

## 2021-12-27 RX ORDER — PANTOPRAZOLE SODIUM 40 MG/1
40 TABLET, DELAYED RELEASE ORAL EVERY MORNING
COMMUNITY
Start: 2021-12-06

## 2021-12-27 NOTE — PROGRESS NOTES
12/27/2021  Patient Information  Shannon Desai Day                                                                                          [address]    [place] [address]   1960  'PCP/Referring Physician'  Jaylene Bradshaw MD  [phone]  No ref. provider found    Chief Complaint   Patient presents with   • Consult     Former Patient Ref by Carlos Garcia PA-C for Poss PAD-Complains of Bilateral Leg Cramps and Left Arm Numbness       History of Present Illness:   The patient is a 61-year-old female that I have seen in the past and in 2019 performed a left carotid endarterectomy.  She is now referred to me for a completely different diagnosis of peripheral arterial vascular disease.  She, also, has had a history of coronary stents.  She is a current every day smoker and began smoking when she was 15 years of age.  At this time, she cannot walk 100 feet without severe cramping in the right calf which forces her to completely stop walking and/or sit down.  After, approximately, 3 to 5 minutes the pain in the right calf resolves.  She, also, complains of a cool right foot and blue toes on the right foot.  She denies a history of rest pain.  She says that the left leg does have cramping in the calf but it is somewhat more mild. A duplex scan demonstrates severe peripheral disease bilaterally but she has not had a CT angiogram.      Patient Active Problem List   Diagnosis   • Stenosis of left carotid artery   • Essential hypertension   • Coronary artery disease involving native coronary artery of native heart without angina pectoris   • Dyslipidemia   • Mucopurulent chronic bronchitis (HCC)   • Carotid stenosis, left   • S/P carotid endarterectomy   • PAD (peripheral artery disease) (HCC)     Past Medical History:   Diagnosis Date   • Anxiety    • Arthritis    • Carotid stenosis    • Coronary artery disease    • Depression    • Dyslipidemia    • GERD (gastroesophageal reflux disease)    •  Hyperlipidemia    • Hypertension    • Myocardial infarction (HCC)    • Peripheral vascular disease (HCC)    • Wears dentures     UPPER PLATE    • Wears glasses      Past Surgical History:   Procedure Laterality Date   • CARDIAC CATHETERIZATION  2016    [facility]   • CAROTID ENDARTERECTOMY Left 4/2/2019    Procedure: CAROTID ENDARTERECTOMY WITH EEG LEFT;  Surgeon: Dallin Quinn MD;  [facility];  Service: Vascular   • CORONARY STENT PLACEMENT  2016    LADx2,Circx1 [facility]   • KNEE SURGERY Left     fracture repair   • TUBAL ABDOMINAL LIGATION         Current Outpatient Medications:   •  aspirin 81 MG EC tablet, Take 81 mg by mouth Daily., Disp: , Rfl: 3  •  clopidogrel (PLAVIX) 75 MG tablet, Take 75 mg by mouth Daily., Disp: , Rfl: 3  •  rosuvastatin (CRESTOR) 20 MG tablet, Take 20 mg by mouth Daily., Disp: , Rfl: 3  •  amLODIPine (NORVASC) 10 MG tablet, Take 10 mg by mouth Daily., Disp: , Rfl: 3  •  famotidine (PEPCID) 20 MG tablet, Take 20 mg by mouth Daily., Disp: , Rfl: 2  •  gabapentin (NEURONTIN) 300 MG capsule, Take 300 mg by mouth 2 (Two) Times a Day., Disp: , Rfl: 3  •  HYDROcodone-acetaminophen (NORCO) 7.5-325 MG per tablet, TAKE ONE TABLET BY MOUTH THREE TIMES A DAY FD: 05/18/19, Disp: , Rfl: 0  •  nicotine (NICODERM CQ) 21 MG/24HR patch, Place 1 patch on the skin as directed by provider Daily., Disp: 14 patch, Rfl: 0  •  pantoprazole (PROTONIX) 40 MG EC tablet, Take 40 mg by mouth Daily., Disp: , Rfl:   Allergies   Allergen Reactions   • Percocet [Oxycodone-Acetaminophen] Nausea And Vomiting     N/V   • Darvon [Propoxyphene] Nausea And Vomiting     Darvocet   • Lortab [Hydrocodone-Acetaminophen] Nausea And Vomiting     Social History     Socioeconomic History   • Marital status: Single   • Number of children: 0   Tobacco Use   • Smoking status: Current Every Day Smoker     Packs/day: 0.50     Years: 46.00     Pack years: 23.00     Types: Cigarettes   • Smokeless tobacco: Never Used   • Tobacco  "comment: started smoking at age 12   Substance and Sexual Activity   • Alcohol use: Yes     Alcohol/week: 21.0 standard drinks     Types: 21 Cans of beer per week   • Drug use: No   • Sexual activity: Defer     Family History   Problem Relation Age of Onset   • Other Mother         brain tumor   • Alcohol abuse Father    • Cirrhosis Father      Review of Systems   Constitutional: Positive for malaise/fatigue and weight loss (15-20 lbs since March 2021). Negative for chills, fever and night sweats.   HENT: Negative for hearing loss, odynophagia and sore throat.    Cardiovascular: Positive for claudication and leg swelling. Negative for chest pain, dyspnea on exertion, orthopnea and palpitations.   Respiratory: Negative for cough and hemoptysis.    Endocrine: Negative for cold intolerance, heat intolerance, polydipsia, polyphagia and polyuria.   Hematologic/Lymphatic: Bruises/bleeds easily.   Skin: Negative for itching and rash.   Musculoskeletal: Positive for arthritis. Negative for joint pain, joint swelling and myalgias.   Gastrointestinal: Negative for abdominal pain, constipation, diarrhea, hematemesis, hematochezia, melena, nausea and vomiting.   Genitourinary: Negative for dysuria, frequency and hematuria.   Neurological: Positive for loss of balance. Negative for focal weakness, headaches, numbness and seizures.   Psychiatric/Behavioral: Positive for depression. Negative for suicidal ideas. The patient is nervous/anxious.    All other systems reviewed and are negative.    Vitals:    12/27/21 0746 12/27/21 0747   BP: 150/80 130/80   BP Location: Left arm Right arm   Patient Position: Sitting Sitting   Pulse: 69    Temp: 97.7 °F (36.5 °C)    SpO2: 91%    Weight: 49 kg (108 lb)    Height: 157.5 cm (62\")       Physical Exam   CONSTITUTIONAL: Alert and conversant, Well dressed, Well nourished, No acute distress  EYES: Sclera clean, Anicteric, Pupils equal  ENT: No nasal deviation, Trachea midline  NECK: No neck " masses, Supple  LUNGS: No wheezing, Cough, non-congested  HEART: No rubs, No murmurs  GASTROINTESTINAL: Soft, non-distended, No masses, Non tender  to palpation, normal bowel sounds  NEURO: No motor deficits, No sensory deficits, Cranial Nerves 2 through 12 grossly intact  PSYCHIATRIC: Oriented to person, place and time, No memory deficits, Mood appropriate  VASCULAR: No carotid bruits, Femoral pulses palpable on the left but weakly palpable on the right the feet are cool the toes on the right foot are blue and dusky.  There are weak Doppler signals in the posterior tibials bilaterally and I cannot palpate pulses in the posterior tibial or dorsalis pedis bilaterally the left foot is clearly viable the right foot is viable but toes are somewhat dusky  MUSKULOSKELETAL: No extremity trauma or extremity asymmetry    The ROS, past medical history, surgical history, family history, social history and vitals were reviewed by myself and corrected as needed.      Labs/Imaging:  I reviewed the duplex scan report demonstrating bilateral peripheral disease.    Assessment/Plan:   The patient is a 61-year-old female who has a number of issues including previous coronary stenting and previous left carotid endarterectomy.  She now has classic symptoms of severe peripheral vascular disease that has been going on for 6 months. She has, primarily in the right leg, clinic cramping in the right calf with only walking 100 feet and a cool right foot.  The foot is viable and there is a Doppler signal, although, it is extremely weak. The patient has chronic obstructive pulmonary disease and a continuous smoking history since she was 15 years of age.  CHRONIC OBSTRUCTIVE PULMONARY DISEASE: This patient has known emphysema and/or a smoking history.  We will obtain and interpret pulmonary function tests and assess arterial blood gas measurements.  If the chronic obstructive pulmonary disease is severe, I will coordinate management with a  pulmonary specialist, either preoperatively or in the immediate postoperative period.  Bronchodilator therapy will be needed and smoking cessation, if applicable, will be instituted. The underlying COPD will add complexity to any planned surgical intervention.  HYPERTENSION: The patient has known hypertension. I will manage the blood pressure closely intraoperatively and postoperatively to maintain end organ perfusion, but also to mitigate against bleeding caused by extreme hypertension.  Will evaluate for beta blockade prior to anesthesia. Hypertension postoperatively will complicate bleeding problems.  HYPERLIPIDEMIA: The patient's statin medication will be continued up to and including the day of surgery. Dietary changes have been discussed.  VASCULAR DISEASE: I plan for aortogram and possible catheter-based intervention.  The patient is aware of the planned procedure.  We have discussed various risks and options.  The patient is aware of the risk of bleeding, infection, contrast reaction, nephrotoxicity, and limb loss.  The patient is agreeable to proceed.    Patient Active Problem List   Diagnosis   • Stenosis of left carotid artery   • Essential hypertension   • Coronary artery disease involving native coronary artery of native heart without angina pectoris   • Dyslipidemia   • Mucopurulent chronic bronchitis (HCC)   • Carotid stenosis, left   • S/P carotid endarterectomy   • PAD (peripheral artery disease) (HCC)       CC: MD Makayla Dwyer editing for Dallin Quinn MD      I, Dallin Quinn MD, have read and agree with the editing done by Makayla Fernandez, .

## 2022-01-14 ENCOUNTER — HOSPITAL ENCOUNTER (OUTPATIENT)
Dept: CT IMAGING | Facility: HOSPITAL | Age: 62
Discharge: HOME | End: 2022-01-14
Admitting: NURSE PRACTITIONER

## 2022-01-14 DIAGNOSIS — I70.213 ATHEROSCLEROSIS OF NATIVE ARTERY OF BOTH LOWER EXTREMITIES WITH INTERMITTENT CLAUDICATION: ICD-10-CM

## 2022-01-14 LAB — CREAT BLDA-MCNC: 0.6 MG/DL (ref 0.6–1.3)

## 2022-01-14 PROCEDURE — 82565 ASSAY OF CREATININE: CPT

## 2022-01-14 PROCEDURE — 75635 CT ANGIO ABDOMINAL ARTERIES: CPT

## 2022-01-14 PROCEDURE — 0 IOPAMIDOL PER 1 ML: Performed by: NURSE PRACTITIONER

## 2022-01-14 RX ADMIN — IOPAMIDOL 100 ML: 755 INJECTION, SOLUTION INTRAVENOUS at 14:00

## 2022-01-24 DIAGNOSIS — I73.9 PAD (PERIPHERAL ARTERY DISEASE): Primary | ICD-10-CM

## 2022-02-06 ENCOUNTER — APPOINTMENT (OUTPATIENT)
Dept: PREADMISSION TESTING | Facility: HOSPITAL | Age: 62
End: 2022-02-06

## 2022-02-23 ENCOUNTER — PREP FOR SURGERY (OUTPATIENT)
Dept: OTHER | Facility: HOSPITAL | Age: 62
End: 2022-02-23

## 2022-02-23 DIAGNOSIS — I73.9 PVD (PERIPHERAL VASCULAR DISEASE): Primary | ICD-10-CM

## 2022-02-23 RX ORDER — CHLORHEXIDINE GLUCONATE 500 MG/1
1 CLOTH TOPICAL EVERY 12 HOURS PRN
Status: CANCELLED | OUTPATIENT
Start: 2022-03-03

## 2022-03-03 ENCOUNTER — PRE-ADMISSION TESTING (OUTPATIENT)
Dept: PREADMISSION TESTING | Facility: HOSPITAL | Age: 62
End: 2022-03-03

## 2022-03-03 ENCOUNTER — HOSPITAL ENCOUNTER (OUTPATIENT)
Dept: GENERAL RADIOLOGY | Facility: HOSPITAL | Age: 62
Discharge: HOME | End: 2022-03-03

## 2022-03-03 VITALS — WEIGHT: 106.37 LBS | BODY MASS INDEX: 18.85 KG/M2 | HEIGHT: 63 IN

## 2022-03-03 DIAGNOSIS — I73.9 PVD (PERIPHERAL VASCULAR DISEASE): ICD-10-CM

## 2022-03-03 LAB
AMPHET+METHAMPHET UR QL: NEGATIVE
AMPHETAMINES UR QL: NEGATIVE
ANION GAP SERPL CALCULATED.3IONS-SCNC: 18 MMOL/L (ref 5–15)
APTT PPP: 30.6 SECONDS (ref 22–39)
BARBITURATES UR QL SCN: NEGATIVE
BENZODIAZ UR QL SCN: NEGATIVE
BUN SERPL-MCNC: 4 MG/DL (ref 8–23)
BUN/CREAT SERPL: 5.9 (ref 7–25)
BUPRENORPHINE SERPL-MCNC: NEGATIVE NG/ML
CALCIUM SPEC-SCNC: 10.2 MG/DL (ref 8.6–10.5)
CANNABINOIDS SERPL QL: POSITIVE
CHLORIDE SERPL-SCNC: 95 MMOL/L (ref 98–107)
CO2 SERPL-SCNC: 21 MMOL/L (ref 22–29)
COCAINE UR QL: NEGATIVE
CREAT SERPL-MCNC: 0.68 MG/DL (ref 0.57–1)
DEPRECATED RDW RBC AUTO: 42 FL (ref 37–54)
EGFRCR SERPLBLD CKD-EPI 2021: 99.8 ML/MIN/1.73
ERYTHROCYTE [DISTWIDTH] IN BLOOD BY AUTOMATED COUNT: 12.2 % (ref 12.3–15.4)
GLUCOSE SERPL-MCNC: 111 MG/DL (ref 65–99)
HBA1C MFR BLD: 5 % (ref 4.8–5.6)
HCT VFR BLD AUTO: 43.7 % (ref 34–46.6)
HGB BLD-MCNC: 14.8 G/DL (ref 12–15.9)
INR PPP: 0.9 (ref 0.85–1.16)
MCH RBC QN AUTO: 31.4 PG (ref 26.6–33)
MCHC RBC AUTO-ENTMCNC: 33.9 G/DL (ref 31.5–35.7)
MCV RBC AUTO: 92.8 FL (ref 79–97)
METHADONE UR QL SCN: NEGATIVE
OPIATES UR QL: NEGATIVE
OXYCODONE UR QL SCN: NEGATIVE
PCP UR QL SCN: NEGATIVE
PLATELET # BLD AUTO: 261 10*3/MM3 (ref 140–450)
PMV BLD AUTO: 10.1 FL (ref 6–12)
POTASSIUM SERPL-SCNC: 4.1 MMOL/L (ref 3.5–5.2)
PROPOXYPH UR QL: NEGATIVE
PROTHROMBIN TIME: 11.9 SECONDS (ref 11.4–14.4)
QT INTERVAL: 366 MS
QTC INTERVAL: 495 MS
RBC # BLD AUTO: 4.71 10*6/MM3 (ref 3.77–5.28)
SODIUM SERPL-SCNC: 134 MMOL/L (ref 136–145)
TRICYCLICS UR QL SCN: NEGATIVE
WBC NRBC COR # BLD: 14.08 10*3/MM3 (ref 3.4–10.8)

## 2022-03-03 PROCEDURE — 85027 COMPLETE CBC AUTOMATED: CPT

## 2022-03-03 PROCEDURE — 93010 ELECTROCARDIOGRAM REPORT: CPT | Performed by: STUDENT IN AN ORGANIZED HEALTH CARE EDUCATION/TRAINING PROGRAM

## 2022-03-03 PROCEDURE — 80306 DRUG TEST PRSMV INSTRMNT: CPT | Performed by: PHYSICIAN ASSISTANT

## 2022-03-03 PROCEDURE — 83036 HEMOGLOBIN GLYCOSYLATED A1C: CPT

## 2022-03-03 PROCEDURE — 80048 BASIC METABOLIC PNL TOTAL CA: CPT

## 2022-03-03 PROCEDURE — 85610 PROTHROMBIN TIME: CPT

## 2022-03-03 PROCEDURE — 71046 X-RAY EXAM CHEST 2 VIEWS: CPT

## 2022-03-03 PROCEDURE — 85730 THROMBOPLASTIN TIME PARTIAL: CPT

## 2022-03-03 PROCEDURE — 80305 DRUG TEST PRSMV DIR OPT OBS: CPT | Performed by: PHYSICIAN ASSISTANT

## 2022-03-03 PROCEDURE — 93005 ELECTROCARDIOGRAM TRACING: CPT

## 2022-03-03 PROCEDURE — 36415 COLL VENOUS BLD VENIPUNCTURE: CPT

## 2022-03-03 NOTE — DISCHARGE INSTRUCTIONS

## 2022-03-04 LAB
COTININE UR QL SCN: POSITIVE NG/ML
Lab: ABNORMAL

## 2022-03-06 ENCOUNTER — CLINICAL SUPPORT NO REQUIREMENTS (OUTPATIENT)
Dept: PREADMISSION TESTING | Facility: HOSPITAL | Age: 62
End: 2022-03-06

## 2022-03-06 DIAGNOSIS — I73.9 PVD (PERIPHERAL VASCULAR DISEASE): ICD-10-CM

## 2022-03-06 LAB — SARS-COV-2 RNA PNL SPEC NAA+PROBE: NOT DETECTED

## 2022-03-06 PROCEDURE — C9803 HOPD COVID-19 SPEC COLLECT: HCPCS

## 2022-03-06 PROCEDURE — U0004 COV-19 TEST NON-CDC HGH THRU: HCPCS

## 2022-03-08 ENCOUNTER — ANESTHESIA EVENT (OUTPATIENT)
Dept: PERIOP | Facility: HOSPITAL | Age: 62
End: 2022-03-08

## 2022-03-08 ENCOUNTER — ANESTHESIA (OUTPATIENT)
Dept: PERIOP | Facility: HOSPITAL | Age: 62
End: 2022-03-08

## 2022-03-08 ENCOUNTER — APPOINTMENT (OUTPATIENT)
Dept: GENERAL RADIOLOGY | Facility: HOSPITAL | Age: 62
End: 2022-03-08

## 2022-03-08 ENCOUNTER — HOSPITAL ENCOUNTER (OUTPATIENT)
Facility: HOSPITAL | Age: 62
Setting detail: SURGERY ADMIT
Discharge: HOME | End: 2022-03-08
Attending: THORACIC SURGERY (CARDIOTHORACIC VASCULAR SURGERY) | Admitting: THORACIC SURGERY (CARDIOTHORACIC VASCULAR SURGERY)

## 2022-03-08 VITALS
BODY MASS INDEX: 18.78 KG/M2 | TEMPERATURE: 97.9 F | RESPIRATION RATE: 15 BRPM | HEIGHT: 63 IN | OXYGEN SATURATION: 90 % | WEIGHT: 106 LBS | SYSTOLIC BLOOD PRESSURE: 107 MMHG | DIASTOLIC BLOOD PRESSURE: 66 MMHG | HEART RATE: 79 BPM

## 2022-03-08 DIAGNOSIS — I73.9 PVD (PERIPHERAL VASCULAR DISEASE): ICD-10-CM

## 2022-03-08 LAB
ABO GROUP BLD: NORMAL
BLD GP AB SCN SERPL QL: NEGATIVE
RH BLD: POSITIVE
T&S EXPIRATION DATE: NORMAL

## 2022-03-08 PROCEDURE — S0260 H&P FOR SURGERY: HCPCS | Performed by: THORACIC SURGERY (CARDIOTHORACIC VASCULAR SURGERY)

## 2022-03-08 PROCEDURE — C1769 GUIDE WIRE: HCPCS | Performed by: THORACIC SURGERY (CARDIOTHORACIC VASCULAR SURGERY)

## 2022-03-08 PROCEDURE — C1894 INTRO/SHEATH, NON-LASER: HCPCS | Performed by: THORACIC SURGERY (CARDIOTHORACIC VASCULAR SURGERY)

## 2022-03-08 PROCEDURE — 25010000002 PROPOFOL 10 MG/ML EMULSION: Performed by: NURSE ANESTHETIST, CERTIFIED REGISTERED

## 2022-03-08 PROCEDURE — C1887 CATHETER, GUIDING: HCPCS | Performed by: THORACIC SURGERY (CARDIOTHORACIC VASCULAR SURGERY)

## 2022-03-08 PROCEDURE — 86900 BLOOD TYPING SEROLOGIC ABO: CPT | Performed by: THORACIC SURGERY (CARDIOTHORACIC VASCULAR SURGERY)

## 2022-03-08 PROCEDURE — 25010000002 HEPARIN (PORCINE) PER 1000 UNITS: Performed by: THORACIC SURGERY (CARDIOTHORACIC VASCULAR SURGERY)

## 2022-03-08 PROCEDURE — 86901 BLOOD TYPING SEROLOGIC RH(D): CPT | Performed by: THORACIC SURGERY (CARDIOTHORACIC VASCULAR SURGERY)

## 2022-03-08 PROCEDURE — 25010000002 DEXAMETHASONE PER 1 MG: Performed by: NURSE ANESTHETIST, CERTIFIED REGISTERED

## 2022-03-08 PROCEDURE — 36200 PLACE CATHETER IN AORTA: CPT | Performed by: THORACIC SURGERY (CARDIOTHORACIC VASCULAR SURGERY)

## 2022-03-08 PROCEDURE — 75625 CONTRAST EXAM ABDOMINL AORTA: CPT | Performed by: THORACIC SURGERY (CARDIOTHORACIC VASCULAR SURGERY)

## 2022-03-08 PROCEDURE — 86850 RBC ANTIBODY SCREEN: CPT | Performed by: THORACIC SURGERY (CARDIOTHORACIC VASCULAR SURGERY)

## 2022-03-08 PROCEDURE — 0 LIDOCAINE 1 % SOLUTION: Performed by: THORACIC SURGERY (CARDIOTHORACIC VASCULAR SURGERY)

## 2022-03-08 PROCEDURE — 75716 ARTERY X-RAYS ARMS/LEGS: CPT

## 2022-03-08 PROCEDURE — 0 CEFUROXIME SODIUM 1.5 G RECONSTITUTED SOLUTION

## 2022-03-08 PROCEDURE — 0 IODIXANOL PER 1 ML: Performed by: THORACIC SURGERY (CARDIOTHORACIC VASCULAR SURGERY)

## 2022-03-08 PROCEDURE — 25010000002 ONDANSETRON PER 1 MG: Performed by: NURSE ANESTHETIST, CERTIFIED REGISTERED

## 2022-03-08 PROCEDURE — 75710 ARTERY X-RAYS ARM/LEG: CPT | Performed by: THORACIC SURGERY (CARDIOTHORACIC VASCULAR SURGERY)

## 2022-03-08 PROCEDURE — 75625 CONTRAST EXAM ABDOMINL AORTA: CPT

## 2022-03-08 PROCEDURE — 25010000002 FENTANYL CITRATE (PF) 50 MCG/ML SOLUTION

## 2022-03-08 RX ORDER — PROMETHAZINE HYDROCHLORIDE 25 MG/1
25 TABLET ORAL ONCE AS NEEDED
Status: DISCONTINUED | OUTPATIENT
Start: 2022-03-08 | End: 2022-03-08 | Stop reason: HOSPADM

## 2022-03-08 RX ORDER — HYDRALAZINE HYDROCHLORIDE 20 MG/ML
5 INJECTION INTRAMUSCULAR; INTRAVENOUS
Status: DISCONTINUED | OUTPATIENT
Start: 2022-03-08 | End: 2022-03-08 | Stop reason: HOSPADM

## 2022-03-08 RX ORDER — SODIUM CHLORIDE 450 MG/100ML
100 INJECTION, SOLUTION INTRAVENOUS CONTINUOUS
Status: CANCELLED | OUTPATIENT
Start: 2022-03-08

## 2022-03-08 RX ORDER — IODIXANOL 320 MG/ML
INJECTION, SOLUTION INTRAVASCULAR AS NEEDED
Status: DISCONTINUED | OUTPATIENT
Start: 2022-03-08 | End: 2022-03-08 | Stop reason: HOSPADM

## 2022-03-08 RX ORDER — ONDANSETRON 2 MG/ML
INJECTION INTRAMUSCULAR; INTRAVENOUS AS NEEDED
Status: DISCONTINUED | OUTPATIENT
Start: 2022-03-08 | End: 2022-03-08 | Stop reason: SURG

## 2022-03-08 RX ORDER — PROPOFOL 10 MG/ML
VIAL (ML) INTRAVENOUS AS NEEDED
Status: DISCONTINUED | OUTPATIENT
Start: 2022-03-08 | End: 2022-03-08 | Stop reason: SURG

## 2022-03-08 RX ORDER — DROPERIDOL 2.5 MG/ML
0.62 INJECTION, SOLUTION INTRAMUSCULAR; INTRAVENOUS ONCE AS NEEDED
Status: DISCONTINUED | OUTPATIENT
Start: 2022-03-08 | End: 2022-03-08 | Stop reason: HOSPADM

## 2022-03-08 RX ORDER — HYDROCODONE BITARTRATE AND ACETAMINOPHEN 5; 325 MG/1; MG/1
1 TABLET ORAL ONCE AS NEEDED
Status: DISCONTINUED | OUTPATIENT
Start: 2022-03-08 | End: 2022-03-08 | Stop reason: HOSPADM

## 2022-03-08 RX ORDER — LABETALOL HYDROCHLORIDE 5 MG/ML
5 INJECTION, SOLUTION INTRAVENOUS
Status: DISCONTINUED | OUTPATIENT
Start: 2022-03-08 | End: 2022-03-08 | Stop reason: HOSPADM

## 2022-03-08 RX ORDER — LIDOCAINE HYDROCHLORIDE 20 MG/ML
INJECTION, SOLUTION INFILTRATION; PERINEURAL AS NEEDED
Status: DISCONTINUED | OUTPATIENT
Start: 2022-03-08 | End: 2022-03-08 | Stop reason: SURG

## 2022-03-08 RX ORDER — MEPERIDINE HYDROCHLORIDE 25 MG/ML
12.5 INJECTION INTRAMUSCULAR; INTRAVENOUS; SUBCUTANEOUS
Status: DISCONTINUED | OUTPATIENT
Start: 2022-03-08 | End: 2022-03-08 | Stop reason: HOSPADM

## 2022-03-08 RX ORDER — SODIUM CHLORIDE, SODIUM LACTATE, POTASSIUM CHLORIDE, CALCIUM CHLORIDE 600; 310; 30; 20 MG/100ML; MG/100ML; MG/100ML; MG/100ML
INJECTION, SOLUTION INTRAVENOUS CONTINUOUS PRN
Status: DISCONTINUED | OUTPATIENT
Start: 2022-03-08 | End: 2022-03-08 | Stop reason: SURG

## 2022-03-08 RX ORDER — ETOMIDATE 2 MG/ML
INJECTION INTRAVENOUS AS NEEDED
Status: DISCONTINUED | OUTPATIENT
Start: 2022-03-08 | End: 2022-03-08 | Stop reason: SURG

## 2022-03-08 RX ORDER — NALOXONE HCL 0.4 MG/ML
0.4 VIAL (ML) INJECTION AS NEEDED
Status: DISCONTINUED | OUTPATIENT
Start: 2022-03-08 | End: 2022-03-08 | Stop reason: HOSPADM

## 2022-03-08 RX ORDER — SODIUM CHLORIDE 0.9 % (FLUSH) 0.9 %
3 SYRINGE (ML) INJECTION EVERY 12 HOURS SCHEDULED
Status: DISCONTINUED | OUTPATIENT
Start: 2022-03-08 | End: 2022-03-08 | Stop reason: HOSPADM

## 2022-03-08 RX ORDER — SODIUM CHLORIDE, SODIUM LACTATE, POTASSIUM CHLORIDE, CALCIUM CHLORIDE 600; 310; 30; 20 MG/100ML; MG/100ML; MG/100ML; MG/100ML
9 INJECTION, SOLUTION INTRAVENOUS CONTINUOUS
Status: DISCONTINUED | OUTPATIENT
Start: 2022-03-08 | End: 2022-03-08 | Stop reason: HOSPADM

## 2022-03-08 RX ORDER — IPRATROPIUM BROMIDE AND ALBUTEROL SULFATE 2.5; .5 MG/3ML; MG/3ML
3 SOLUTION RESPIRATORY (INHALATION) ONCE AS NEEDED
Status: DISCONTINUED | OUTPATIENT
Start: 2022-03-08 | End: 2022-03-08 | Stop reason: HOSPADM

## 2022-03-08 RX ORDER — DEXAMETHASONE SODIUM PHOSPHATE 10 MG/ML
INJECTION INTRAMUSCULAR; INTRAVENOUS AS NEEDED
Status: DISCONTINUED | OUTPATIENT
Start: 2022-03-08 | End: 2022-03-08 | Stop reason: SURG

## 2022-03-08 RX ORDER — DROPERIDOL 2.5 MG/ML
0.62 INJECTION, SOLUTION INTRAMUSCULAR; INTRAVENOUS AS NEEDED
Status: DISCONTINUED | OUTPATIENT
Start: 2022-03-08 | End: 2022-03-08 | Stop reason: HOSPADM

## 2022-03-08 RX ORDER — ONDANSETRON 2 MG/ML
4 INJECTION INTRAMUSCULAR; INTRAVENOUS ONCE AS NEEDED
Status: DISCONTINUED | OUTPATIENT
Start: 2022-03-08 | End: 2022-03-08 | Stop reason: HOSPADM

## 2022-03-08 RX ORDER — HYDROMORPHONE HYDROCHLORIDE 1 MG/ML
0.5 INJECTION, SOLUTION INTRAMUSCULAR; INTRAVENOUS; SUBCUTANEOUS
Status: DISCONTINUED | OUTPATIENT
Start: 2022-03-08 | End: 2022-03-08 | Stop reason: HOSPADM

## 2022-03-08 RX ORDER — PROMETHAZINE HYDROCHLORIDE 25 MG/1
25 SUPPOSITORY RECTAL ONCE AS NEEDED
Status: DISCONTINUED | OUTPATIENT
Start: 2022-03-08 | End: 2022-03-08 | Stop reason: HOSPADM

## 2022-03-08 RX ORDER — BUPIVACAINE HCL/0.9 % NACL/PF 0.125 %
PLASTIC BAG, INJECTION (ML) EPIDURAL AS NEEDED
Status: DISCONTINUED | OUTPATIENT
Start: 2022-03-08 | End: 2022-03-08 | Stop reason: SURG

## 2022-03-08 RX ORDER — LIDOCAINE HYDROCHLORIDE 10 MG/ML
0.2 INJECTION, SOLUTION INFILTRATION; PERINEURAL ONCE
Status: COMPLETED | OUTPATIENT
Start: 2022-03-08 | End: 2022-03-08

## 2022-03-08 RX ORDER — SODIUM CHLORIDE 0.9 % (FLUSH) 0.9 %
3-10 SYRINGE (ML) INJECTION AS NEEDED
Status: DISCONTINUED | OUTPATIENT
Start: 2022-03-08 | End: 2022-03-08 | Stop reason: HOSPADM

## 2022-03-08 RX ORDER — LIDOCAINE HYDROCHLORIDE 10 MG/ML
INJECTION, SOLUTION INFILTRATION; PERINEURAL AS NEEDED
Status: DISCONTINUED | OUTPATIENT
Start: 2022-03-08 | End: 2022-03-08 | Stop reason: HOSPADM

## 2022-03-08 RX ORDER — FENTANYL CITRATE 50 UG/ML
50 INJECTION, SOLUTION INTRAMUSCULAR; INTRAVENOUS
Status: DISCONTINUED | OUTPATIENT
Start: 2022-03-08 | End: 2022-03-08 | Stop reason: HOSPADM

## 2022-03-08 RX ORDER — FAMOTIDINE 20 MG/1
20 TABLET, FILM COATED ORAL ONCE
Status: COMPLETED | OUTPATIENT
Start: 2022-03-08 | End: 2022-03-08

## 2022-03-08 RX ORDER — FENTANYL CITRATE 50 UG/ML
INJECTION, SOLUTION INTRAMUSCULAR; INTRAVENOUS
Status: COMPLETED
Start: 2022-03-08 | End: 2022-03-08

## 2022-03-08 RX ADMIN — FENTANYL CITRATE 50 MCG: 0.05 INJECTION, SOLUTION INTRAMUSCULAR; INTRAVENOUS at 15:25

## 2022-03-08 RX ADMIN — SODIUM CHLORIDE, POTASSIUM CHLORIDE, SODIUM LACTATE AND CALCIUM CHLORIDE 9 ML/HR: 600; 310; 30; 20 INJECTION, SOLUTION INTRAVENOUS at 12:20

## 2022-03-08 RX ADMIN — Medication 100 MCG: at 14:33

## 2022-03-08 RX ADMIN — DEXAMETHASONE SODIUM PHOSPHATE 4 MG: 10 INJECTION INTRAMUSCULAR; INTRAVENOUS at 14:14

## 2022-03-08 RX ADMIN — FAMOTIDINE 20 MG: 20 TABLET, FILM COATED ORAL at 12:33

## 2022-03-08 RX ADMIN — FENTANYL CITRATE 50 MCG: 50 INJECTION, SOLUTION INTRAMUSCULAR; INTRAVENOUS at 15:25

## 2022-03-08 RX ADMIN — PROPOFOL 25 MG: 10 INJECTION, EMULSION INTRAVENOUS at 14:03

## 2022-03-08 RX ADMIN — LIDOCAINE HYDROCHLORIDE 0.2 ML: 10 INJECTION, SOLUTION EPIDURAL; INFILTRATION; INTRACAUDAL; PERINEURAL at 12:20

## 2022-03-08 RX ADMIN — Medication 100 MCG: at 14:15

## 2022-03-08 RX ADMIN — SODIUM CHLORIDE, POTASSIUM CHLORIDE, SODIUM LACTATE AND CALCIUM CHLORIDE: 600; 310; 30; 20 INJECTION, SOLUTION INTRAVENOUS at 14:02

## 2022-03-08 RX ADMIN — ONDANSETRON 4 MG: 2 INJECTION INTRAMUSCULAR; INTRAVENOUS at 14:33

## 2022-03-08 RX ADMIN — ETOMIDATE 8 MG: 2 INJECTION, SOLUTION INTRAVENOUS at 14:02

## 2022-03-08 RX ADMIN — LIDOCAINE HYDROCHLORIDE 50 MG: 20 INJECTION, SOLUTION INFILTRATION; PERINEURAL at 14:02

## 2022-03-08 RX ADMIN — PROPOFOL 50 MG: 10 INJECTION, EMULSION INTRAVENOUS at 14:02

## 2022-03-08 NOTE — OP NOTE
Operative Report    Preop Diagnosis: Peripheral arterial vascular disease.  Previous carotid endarterectomy.  Claudication right leg worse than left.  Long history of tobacco abuse            Procedure: Catheter in the left femoral artery.  Cath in aorta.  Aortogram.  We position of catheter below the renal arteries complete arteriographic runoff of the lower extremities.        Surgeons: Dallin Quinn MD      Assistant: Jamir Ellis PA-C    The Assistant provided services of suctioning, irrigation and retraction.  Also, assisted in suture closure of parts of the skin incision.      Indication: This patient is fairly well-known to me as I performed her previous carotid surgery.  She has a long and ongoing history of tobacco abuse and stated she was smoking up to 1 pack a day when she was 15 years of age.  Her sister reports she may have been smoking this much since 13 of age.  In any event she has had claudication in the quite severe in the right leg and says she cannot walk 100 feet.  She understood our plan procedure risk of bleeding infection contrast reaction nephrotoxicity and agreed to proceed.        Description: Supine position sterile prep and drape.  Left femoral artery had a weak pulse the right femoral had no palpable pulse.  I cannulated the left femoral artery and an 035 guide was placed in the mid descending thoracic aorta under fluoroscopic visualization and a 4 Bengali sheath was placed.  Pigtail catheter was placed aortogram demonstrated what appeared to be a heavily calcified right renal artery with possibly some stenosis but it did not seem significant.  The left renal artery had noticed evidence of stenosis.  The aorta itself was small with luminal irregularities but had no stenosis.  In the right leg the common and external iliac arteries were extremely small and heavily calcified but had no focal stenosis.  They did have a hypoplastic appearance.  The right common femoral artery itself was  occluded at what appeared to be the level of the inguinal ligament and reconstituted at the profundus and superficial femoral takeoff.  In the mid thigh there was approximately 70% narrowing in the right superficial femoral.  Distal to that the popliteal and trifurcation vessels were small but normal in their appearance and runoff to the foot.  The left leg the common and external iliacs are hypoplastic but had no stenosis.  It appeared the sheath itself was occlusive in the left common femoral artery but a this artery had a pulse prior to its cannulation.  The left superficial femoral was calcified with no stenosis in the left popliteal trifurcation vessels were extremely small but had no stenosis.  This time we plan to return for a long endarterectomy of the right common femoral artery with a pericardial patch closure.  This might have to extend to the distal portion of the external iliac on the right.  At that same operation we will probably proceed with catheter-based means with angioplasty and stenting of the right superficial femoral artery.  If there is any question at that time about the left common femoral retrograde hand-held injection from the right side would more elucidate the left common femoral but for now I believe it is just occluded because of the sheath.  Total amount of contrast 60 mL total fluoroscopy time 1 minute 42 seconds      Please note that portions of this note were completed with a voice recognition program. Efforts were made to edit the dictations, but occasionally words are mistranscribed.

## 2022-03-08 NOTE — ANESTHESIA POSTPROCEDURE EVALUATION
Patient: Shannon Chan    Procedure Summary     Date: 03/08/22 Room / Location: [facility] OR 01 /  [facility]    Anesthesia Start: 1359 Anesthesia Stop: 1453    Procedure: AORTAGRAM WITH RUNOFFS (N/A Abdomen) Diagnosis:       PAD (peripheral artery disease) ([facility])      (PAD (peripheral artery disease) ([facility]) [I73.9])    Surgeons: Dallin Quinn MD Provider: Sebastien Duran MD    Anesthesia Type: general ASA Status: 3          Anesthesia Type: general    Vitals  Vitals Value Taken Time   /78 03/08/22 1455   Temp 99.4 °F (37.4 °C) 03/08/22 1450   Pulse 85 03/08/22 1455   Resp 16 03/08/22 1455   SpO2 96 % 03/08/22 1455           Post Anesthesia Care and Evaluation    Patient location during evaluation: PACU  Patient participation: complete - patient participated  Level of consciousness: awake and alert  Pain management: adequate  Airway patency: patent  Anesthetic complications: No anesthetic complications  PONV Status: none  Cardiovascular status: hemodynamically stable and acceptable  Respiratory status: nonlabored ventilation, acceptable and nasal cannula  Hydration status: acceptable

## 2022-03-08 NOTE — ANESTHESIA PROCEDURE NOTES
Airway  Urgency: elective    Date/Time: 3/8/2022 2:04 PM  Airway not difficult    General Information and Staff    Patient location during procedure: OR  CRNA: Shannon Milan CRNA    Indications and Patient Condition  Indications for airway management: airway protection    Preoxygenated: yes  Mask difficulty assessment: 1 - vent by mask    Final Airway Details  Final airway type: supraglottic airway      Successful airway: I-gel  Size 3    Number of attempts at approach: 1  Assessment: lips, teeth, and gum same as pre-op    Additional Comments  LMA placed without difficulty, ventilation with assist, equal breath sounds and symmetric chest rise and fall

## 2022-03-08 NOTE — ANESTHESIA POSTPROCEDURE EVALUATION
Patient: Shannon Chan    Procedure Summary     Date: 03/08/22 Room / Location:  [facility] /  [facility]    Anesthesia Start: 1359 Anesthesia Stop:     Procedure: AORTAGRAM WITH RUNOFFS (N/A Abdomen) Diagnosis:       PAD (peripheral artery disease) ([facility])      (PAD (peripheral artery disease) ([facility]) [I73.9])    Surgeons: Dallin Quinn MD Provider: Sebastien Duran MD    Anesthesia Type: general ASA Status: 3          Anesthesia Type: general    Vitals  No vitals data found for the desired time range.          Post Anesthesia Care and Evaluation    Patient location during evaluation: PACU  Patient participation: complete - patient participated  Level of consciousness: awake and alert  Pain management: adequate  Airway patency: patent  Anesthetic complications: No anesthetic complications  PONV Status: none  Cardiovascular status: hemodynamically stable and acceptable  Respiratory status: nonlabored ventilation, acceptable and nasal cannula  Hydration status: acceptable

## 2022-03-08 NOTE — H&P
Pre-Op H&P  Shannon Desai Day  [id number]  1960      Chief complaint: Leg pain      Subjective:  Patient is a 61 y.o.female presents for scheduled surgery by Dr. Quinn. She anticipates a AORTAGRAM WITH OR WITHOUT RUNOFFS with bilateral iliac STENT with possible renal stent today. She complains of worsening BLE pain and intermittent swelling. She denies wounds of skin discoloration. She cannot walk 100 feet without severe cramping in the right calf which forces her to completely stop walking and/or sit down nor stand for a prolonged period of time. She had CT angio 1/14/22 that showed extensive atherosclerotic disease seen throughout the mesenteric vessels as well as the lower chimney vessels with multiple areas of severe stenoses seen bilaterally. There is severe stenosis at the superior mesenteric artery as well as the bilateral renal arteries.      Review of Systems:  Constitutional-- No fever, chills or sweats. No fatigue.  CV-- No chest pain, palpitation or syncope. +HTN, HLD, CAD  Resp-- No SOB, cough, hemoptysis. +smoker  Skin--No rashes or lesions      Allergies:   Allergies   Allergen Reactions   • Percocet [Oxycodone-Acetaminophen] Nausea And Vomiting     N/V   • Lortab [Hydrocodone-Acetaminophen] Nausea And Vomiting   • Darvon [Propoxyphene] Nausea And Vomiting     Darvocet         Home Meds:  Medications Prior to Admission   Medication Sig Dispense Refill Last Dose   • amLODIPine (NORVASC) 10 MG tablet Take 10 mg by mouth Every Morning.  3 3/8/2022 at 0700   • aspirin 81 MG EC tablet Take 81 mg by mouth Daily.  3 3/8/2022 at 0700   • clopidogrel (PLAVIX) 75 MG tablet Take 75 mg by mouth Every Morning. Per office do not stop  3 3/8/2022 at 0700   • pantoprazole (PROTONIX) 40 MG EC tablet Take 40 mg by mouth Every Morning.   3/8/2022 at 0700   • rosuvastatin (CRESTOR) 20 MG tablet Take 20 mg by mouth Every Morning.  3 3/8/2022 at 0700         PMH:   Past Medical History:   Diagnosis Date   • Anxiety  "   • Arthritis    • Coronary artery disease     s/p stent in 2016   • Depression    • Dyslipidemia    • GERD (gastroesophageal reflux disease)    • Hyperlipidemia    • Hypertension    • Myocardial infarction (HCC)     age 28   • Peripheral vascular disease (HCC)    • Wears dentures     UPPER PLATE    • Wears glasses      PSH:    Past Surgical History:   Procedure Laterality Date   • CARDIAC CATHETERIZATION  2016    [place]   • CAROTID ENDARTERECTOMY Left 4/2/2019    Procedure: CAROTID ENDARTERECTOMY WITH EEG LEFT;  Surgeon: Dallin Quinn MD;  Location: [facility];  Service: Vascular   • CORONARY STENT PLACEMENT  2016    LADx2,Circx1 [facility]   • KNEE SURGERY Left     fracture repair   • TUBAL ABDOMINAL LIGATION         Immunization History:  Influenza: No  Pneumococcal: No  Tetanus: UTD  Covid : No    Social History:   Tobacco:   Social History     Tobacco Use   Smoking Status Current Every Day Smoker   • Packs/day: 0.50   • Years: 46.00   • Pack years: 23.00   • Types: Cigarettes   Smokeless Tobacco Never Used   Tobacco Comment    started smoking at age 12      Alcohol:     Social History     Substance and Sexual Activity   Alcohol Use Yes   • Alcohol/week: 21.0 standard drinks   • Types: 21 Cans of beer per week         Physical Exam:/96 (BP Location: Right arm, Patient Position: Sitting)   Pulse 105   Temp 97.7 °F (36.5 °C) (Temporal)   Resp 18   Ht 160 cm (63\")   Wt 48.1 kg (106 lb)   SpO2 96%   Breastfeeding No   BMI 18.78 kg/m²       General Appearance:    Alert, cooperative, no distress, appears stated age   Head:    Normocephalic, without obvious abnormality, atraumatic   Lungs:     Clear to auscultation bilaterally, respirations unlabored    Heart:   Regular rate and rhythm, S1 and S2 normal    Abdomen:    Soft without tenderness   Extremities:   Extremities normal, atraumatic, no cyanosis or edema   Skin:   Skin color, texture, turgor normal, no rashes or lesions   Neurologic:   Grossly " intact     Results Review:     LABS:  Lab Results   Component Value Date    WBC 14.08 (H) 03/03/2022    HGB 14.8 03/03/2022    HCT 43.7 03/03/2022    MCV 92.8 03/03/2022     03/03/2022    NEUTROABS 12.93 (H) 04/03/2019    GLUCOSE 111 (H) 03/03/2022    BUN 4 (L) 03/03/2022    CREATININE 0.68 03/03/2022    EGFRIFNONA 75 04/03/2019     (L) 03/03/2022    K 4.1 03/03/2022    CL 95 (L) 03/03/2022    CO2 21.0 (L) 03/03/2022    CALCIUM 10.2 03/03/2022       RADIOLOGY:  1/14/22 CT abd angio:  FINDINGS:      ABDOMEN: The lung bases are grossly clear. Chronic changes in the lung  bases bilaterally. The liver is homogeneous in appearance. No stones in  the gallbladder. The spleen is unremarkable. Kidneys and adrenal glands  are within normal limits. Pancreas is homogeneous. There is extensive  atherosclerotic disease seen within the mesenteric vessels. There is  atherosclerotic disease identified in the renal arteries bilaterally  with findings concerning for possible stenosis of the renal arteries  bilaterally. Clinical correlation is needed.     PELVIS: The pelvic organs are unremarkable. The pelvic portion of the  gastrointestinal tract is within normal limits. Stool seen scattered  diffusely throughout the colon. No evidence of obvious obstruction or  gross free intraperitoneal air.     VASCULATURE: There is severe stenosis identified of the superior  mesenteric artery proximally of greater than 80%. Findings concerning  for severe stenosis as well seen of the renal arteries bilaterally.  Extensive atherosclerotic disease seen within the abdominal aorta. No  aneurysmal dilatation. There is severe stenosis greater than 80% of the  proximal left common iliac artery. The right common iliac artery reveals  atherosclerotic disease with no significant stenosis. There is severe  stenosis of greater than 80% in the external iliac artery on the right.  The left external iliac artery reveals 2 areas of severe stenoses.      The right lower extremity reveals severe stenosis of the artery on the  right. There is severe stenosis identified of the proximal superficial  femoral artery on the right. There is a severe stenosis in the distal  superficial femoral artery on the right. The popliteal artery is patent  with atherosclerotic disease. There is a three-vessel runoff identified  to the right ankle.     The left lower extremity reveals severe stenosis identified in the  common femoral artery with moderate stenosis seen of the left  superficial femoral artery proximally. There is a moderate stenosis of  the distal superficial femoral artery on the left. The popliteal artery  reveals atherosclerotic disease with a two-vessel runoff identified to  the left ankle.     IMPRESSION:  Extensive atherosclerotic disease seen throughout the  mesenteric vessels as well as the lower chimney vessels with multiple  areas of severe stenoses seen bilaterally. There is severe stenosis at  the superior mesenteric artery as well as the bilateral renal arteries.  Continued follow-up recommended if indicated.    I reviewed the patient's new clinical results.    Cancer Staging (if applicable)  Cancer Patient: __ yes __no __unknown; If yes, clinical stage T:__ N:__M:__, stage group or __N/A      Impression:  PAD (peripheral artery disease)      Plan: AORTAGRAM WITH OR WITHOUT RUNOFFS with bilateral iliac STENT with possible renal stent  Agreed the above.  This patient has been smoking since she was 15 years of age.  Output performed her previous carotid endarterectomies.  Plan for aortogram with runoff catheter-based intervention she is aware of the risk of contrast reaction nephrotoxicity bleeding etc. and agrees to proceed.    Claudia Palomares, DENIS   3/8/2022   13:03 EST

## 2022-03-08 NOTE — ANESTHESIA PREPROCEDURE EVALUATION
Anesthesia Evaluation     Patient summary reviewed and Nursing notes reviewed   NPO Solid Status: > 8 hours  NPO Liquid Status: > 2 hours           Airway   Mallampati: I  TM distance: >3 FB  Neck ROM: full  No difficulty expected  Dental      Pulmonary    (+) decreased breath sounds,   Cardiovascular     ECG reviewed  Rhythm: regular  Rate: normal    (+) hypertension, CAD, cardiac stents PVD, hyperlipidemia,  carotid artery disease      Neuro/Psych  GI/Hepatic/Renal/Endo    (+)  GERD,      Musculoskeletal     Abdominal    Substance History      OB/GYN          Other   arthritis,                      Anesthesia Plan    ASA 3     general     intravenous induction     Anesthetic plan, all risks, benefits, and alternatives have been provided, discussed and informed consent has been obtained with: patient.    Plan discussed with CRNA.        CODE STATUS:

## 2022-03-09 NOTE — DISCHARGE SUMMARY
CTS Discharge Summary    Patient Care Team:  Zachary Shepherd III, MD as PCP - General (Family Medicine)  Consults:   Consults     No orders found for last 30 day(s).          Date of Admission: 3/8/2022 11:51 AM  Date of Discharge:  3/8/2022    Discharge Diagnosis  Past Medical History:   Diagnosis Date   • Anxiety    • Arthritis    • Coronary artery disease     s/p stent in 2016   • Depression    • Dyslipidemia    • GERD (gastroesophageal reflux disease)    • Hyperlipidemia    • Hypertension    • Myocardial infarction ([facility])     age 28   • Peripheral vascular disease ([facility])    • Wears dentures     UPPER PLATE    • Wears glasses      PAD (peripheral artery disease) ([facility]) [I73.9]     Procedures Performed  Procedure(s):  AORTAGRAM WITH RUNOFFS     History of Present Illness  Patient is a 61 y.o. female  presents for scheduled surgery by Dr. Quinn. She anticipates a AORTAGRAM WITH OR WITHOUT RUNOFFS with bilateral iliac STENT with possible renal stent today. She complains of worsening BLE pain and intermittent swelling. She denies wounds of skin discoloration. She cannot walk 100 feet without severe cramping in the right calf which forces her to completely stop walking and/or sit down nor stand for a prolonged period of time. She had CT angio 1/14/22 that showed extensive atherosclerotic disease seen throughout the mesenteric vessels as well as the lower chimney vessels with multiple areas of severe stenoses seen bilaterally. There is severe stenosis at the superior mesenteric artery as well as the bilateral renal arteries.         Hospital Course  Admitted on 3/8/2022 and underwent the above-mentioned surgical procedure.  Following the procedure patient was transferred to the recovery room, extubated.  Once PACU criteria was met the patient was discharged home.  Please see operative note for full results    Op note results copied and pasted:   Pigtail catheter was placed aortogram demonstrated what appeared to  be a heavily calcified right renal artery with possibly some stenosis but it did not seem significant.  The left renal artery had noticed evidence of stenosis.  The aorta itself was small with luminal irregularities but had no stenosis.  In the right leg the common and external iliac arteries were extremely small and heavily calcified but had no focal stenosis.  They did have a hypoplastic appearance.  The right common femoral artery itself was occluded at what appeared to be the level of the inguinal ligament and reconstituted at the profundus and superficial femoral takeoff.  In the mid thigh there was approximately 70% narrowing in the right superficial femoral.  Distal to that the popliteal and trifurcation vessels were small but normal in their appearance and runoff to the foot.  The left leg the common and external iliacs are hypoplastic but had no stenosis.  It appeared the sheath itself was occlusive in the left common femoral artery but a this artery had a pulse prior to its cannulation.  The left superficial femoral was calcified with no stenosis in the left popliteal trifurcation vessels were extremely small but had no stenosis.  This time we plan to return for a long endarterectomy of the right common femoral artery with a pericardial patch closure.  This might have to extend to the distal portion of the external iliac on the right.  At that same operation we will probably proceed with catheter-based means with angioplasty and stenting of the right superficial femoral artery.     Discharge Medications     Discharge Medications      Continue These Medications      Instructions Start Date   amLODIPine 10 MG tablet  Commonly known as: NORVASC   10 mg, Oral, Every Morning      aspirin 81 MG EC tablet   81 mg, Oral, Daily      clopidogrel 75 MG tablet  Commonly known as: PLAVIX   75 mg, Oral, Every Morning, Per office do not stop      pantoprazole 40 MG EC tablet  Commonly known as: PROTONIX   40 mg, Oral,  Every Morning      rosuvastatin 20 MG tablet  Commonly known as: CRESTOR   20 mg, Oral, Every Morning             Discharge Diet:   Diet Instructions     Diet: Regular, Cardiac      Discharge Diet:  Regular  Cardiac             Activity at Discharge:   Activity Instructions     Driving Restrictions      No driving until released and follow-up appointment    Type of Restriction: Driving    Driving Restrictions: No Driving While Taking Narcotics    Lifting Restrictions      Type of Restriction: Lifting    Lifting Restrictions: Lifting Restriction (Indicate Limit)    Weight Limit (Pounds): 10    Length of Lifting Restriction: To be addressed in follow-up appointment           WOUND CARE: Monitor surgical wounds daily.  Keep clean and dry.  Change surgical bandage as needed to keep clean and dry otherwise surgical bandage not needed.  Call cardiovascular and thoracic surgeon's office with any question or concerns regarding the incisions.  Phone number is [phone] specifically let them know if changes such as increased redness, increased pain, increased swelling, increased drainage or opening up of incision occurs.        MAYNOR Neely  03/09/22  07:33 EST

## 2022-04-04 ENCOUNTER — OFFICE VISIT (OUTPATIENT)
Dept: CARDIAC SURGERY | Facility: CLINIC | Age: 62
End: 2022-04-04

## 2022-04-04 VITALS
HEART RATE: 73 BPM | OXYGEN SATURATION: 91 % | WEIGHT: 104 LBS | SYSTOLIC BLOOD PRESSURE: 128 MMHG | HEIGHT: 63 IN | BODY MASS INDEX: 18.43 KG/M2 | DIASTOLIC BLOOD PRESSURE: 99 MMHG | TEMPERATURE: 97.8 F

## 2022-04-04 DIAGNOSIS — I73.9 PAD (PERIPHERAL ARTERY DISEASE): Primary | ICD-10-CM

## 2022-04-04 DIAGNOSIS — I65.22 CAROTID STENOSIS, LEFT: ICD-10-CM

## 2022-04-04 DIAGNOSIS — I65.21 CAROTID STENOSIS, ASYMPTOMATIC, RIGHT: ICD-10-CM

## 2022-04-04 PROCEDURE — 99214 OFFICE O/P EST MOD 30 MIN: CPT | Performed by: NURSE PRACTITIONER

## 2022-04-04 NOTE — PROGRESS NOTES
[facility] Cardiothoracic Surgery Office Follow Up Note     Date of Encounter: 04/04/2022     YOB: 1960  Name: Shannon Chan    PCP: Zachary Shepherd III, MD    Chief Complaint:    Chief Complaint   Patient presents with   • Post-op     Hosp f/u S/P AA w/ RO - RT leg is very painful with throbbing throughout the whole leg       History of Present Illness:    Shannon Chan is a 61 y.o. female with a history of hypertension, hyperlipidemia, ongoing tobacco use, CAD s/p stenting, bilateral carotid stenosis (50% on right preoperatively) s/p left carotid endarterectomy on 4/2/2019 with Dr. Quinn and PVD s/p AA with runoff on 3/8/2022 with Dr. Quinn.  Patient presents today for post procedure follow-up and to discuss revascularization options.  Since procedure, patient continues to have bilateral lower extremity claudication symptoms, right greater than left.  She denies any LE ulcerations and denies any rest pain.  She is continuing to smoke.  She is on DAPT.  Patient was last seen in 2019 postoperative follow-up with Dr. Quinn in regards to her bilateral carotid stenosis s/p left CEA with plans for 1 year carotid duplex with Dr. Macario's office with follow-up in our office and unfortunately was lost to follow-up.  Patient is now following with .  She denies any carotid duplex since 2019 and in review of his recent office note with referral for her PVD to Dr. Quinn, does not appear that she has had a carotid duplex since 2019.  She denies any TIA/CVA symptoms.  Unfortunately, patient continues to smoke.    Review of Systems:  Review of Systems   Constitutional: Positive for malaise/fatigue and weight loss. Negative for chills, decreased appetite and fever.   Cardiovascular: Positive for claudication. Negative for chest pain, dyspnea on exertion, irregular heartbeat, leg swelling, near-syncope, orthopnea, palpitations and syncope.   Respiratory: Negative for cough,  hemoptysis, shortness of breath, sputum production and wheezing.    Hematologic/Lymphatic: Negative for bleeding problem. Does not bruise/bleed easily.   Skin: Negative for color change, poor wound healing and rash.   Musculoskeletal: Negative for back pain, falls and joint pain.   Gastrointestinal: Negative for abdominal pain, constipation, diarrhea, nausea and vomiting.   Neurological: Negative for focal weakness, numbness and paresthesias.   Psychiatric/Behavioral: Negative for depression. The patient does not have insomnia.        Allergies:  Allergies   Allergen Reactions   • Percocet [Oxycodone-Acetaminophen] Nausea And Vomiting     N/V   • Lortab [Hydrocodone-Acetaminophen] Nausea And Vomiting   • Darvon [Propoxyphene] Nausea And Vomiting     Darvocet       Medications:      Current Outpatient Medications:   •  amLODIPine (NORVASC) 10 MG tablet, Take 10 mg by mouth Every Morning., Disp: , Rfl: 3  •  aspirin 81 MG EC tablet, Take 81 mg by mouth Daily., Disp: , Rfl: 3  •  clopidogrel (PLAVIX) 75 MG tablet, Take 75 mg by mouth Every Morning. Per office do not stop, Disp: , Rfl: 3  •  pantoprazole (PROTONIX) 40 MG EC tablet, Take 40 mg by mouth Every Morning., Disp: , Rfl:   •  rosuvastatin (CRESTOR) 20 MG tablet, Take 20 mg by mouth Every Morning., Disp: , Rfl: 3    Social History     Socioeconomic History   • Marital status: Single   • Number of children: 0   Tobacco Use   • Smoking status: Current Every Day Smoker     Packs/day: 0.50     Years: 46.00     Pack years: 23.00     Types: Cigarettes   • Smokeless tobacco: Never Used   • Tobacco comment: started smoking at age 12   Vaping Use   • Vaping Use: Never used   Substance and Sexual Activity   • Alcohol use: Yes     Alcohol/week: 21.0 standard drinks     Types: 21 Cans of beer per week   • Drug use: No   • Sexual activity: Defer       Family History   Problem Relation Age of Onset   • Other Mother         brain tumor   • Alcohol abuse Father    • Cirrhosis  "Father        Past Medical History:   Diagnosis Date   • Anxiety    • Arthritis    • Coronary artery disease     s/p stent in 2016   • Depression    • Dyslipidemia    • GERD (gastroesophageal reflux disease)    • Hyperlipidemia    • Hypertension    • Myocardial infarction (HCC)     age 28   • Peripheral vascular disease (HCC)    • Wears dentures     UPPER PLATE    • Wears glasses        Past Surgical History:   Procedure Laterality Date   • AORTAGRAM N/A 3/8/2022    Procedure: AORTAGRAM WITH RUNOFFS;  Surgeon: Dallin Quinn MD;  Location: [facility] HYBRID SHAWN;  Service: Vascular;  Laterality: N/A;  FLURO-1 MIN 42 SEC    DOSE- 99.36mGy    VISI-60ML   • CARDIAC CATHETERIZATION  2016    [place]   • CAROTID ENDARTERECTOMY Left 4/2/2019    Procedure: CAROTID ENDARTERECTOMY WITH EEG LEFT;  Surgeon: Dallin Quinn MD;  Location: [facility] OR;  Service: Vascular   • CORONARY STENT PLACEMENT  2016    LADx2,Circx1 [place]   • KNEE SURGERY Left     fracture repair   • TUBAL ABDOMINAL LIGATION         I have reviewed the following portions of the patient's history: allergies, current medications, past family history, past medical history, past social history, past surgical history and problem list and confirm it's accurate.    Physical Exam:  Vital Signs:    Vitals:    04/04/22 1132   BP: 128/99   BP Location: Right arm   Pulse: 73   Temp: 97.8 °F (36.6 °C)   SpO2: 91%   Weight: 47.2 kg (104 lb)   Height: 160 cm (63\")     Body mass index is 18.42 kg/m².     Physical Exam  Vitals and nursing note reviewed.   Constitutional:       Appearance: Normal appearance. She is well-developed and well-groomed.   HENT:      Head: Normocephalic and atraumatic.   Neck:      Vascular: Carotid bruit (bilateral) present.   Cardiovascular:      Rate and Rhythm: Normal rate and regular rhythm.      Pulses:           Dorsalis pedis pulses are 0 on the right side and 1+ on the left side.        Posterior tibial pulses are 0 on the right side. "      Heart sounds: Normal heart sounds, S1 normal and S2 normal. No murmur heard.    No friction rub.   Pulmonary:      Comments: Unlabored, Clear to auscultation bilaterally  Musculoskeletal:      Cervical back: Neck supple.      Right lower leg: No edema.      Left lower leg: No edema.   Skin:     General: Skin is warm and dry.      Comments: Left groin puncture site  No surrounding erythema, hematoma or induration   Neurological:      Mental Status: She is alert and oriented to person, place, and time.   Psychiatric:         Attention and Perception: Attention normal.         Mood and Affect: Mood normal.         Speech: Speech normal.         Behavior: Behavior is cooperative.         Labs/Imaging:    No radiology results for the last 30 days.   3/8/22 AA with runoff Dr. Quinn:Pigtail catheter was placed aortogram demonstrated what appeared to be a heavily calcified right renal artery with possibly some stenosis but it did not seem significant.  The left renal artery had noticed evidence of stenosis.  The aorta itself was small with luminal irregularities but had no stenosis.  In the right leg the common and external iliac arteries were extremely small and heavily calcified but had no focal stenosis.  They did have a hypoplastic appearance.  The right common femoral artery itself was occluded at what appeared to be the level of the inguinal ligament and reconstituted at the profundus and superficial femoral takeoff.  In the mid thigh there was approximately 70% narrowing in the right superficial femoral.  Distal to that the popliteal and trifurcation vessels were small but normal in their appearance and runoff to the foot.  The left leg the common and external iliacs are hypoplastic but had no stenosis.  It appeared the sheath itself was occlusive in the left common femoral artery but a this artery had a pulse prior to its cannulation.  The left superficial femoral was calcified with no stenosis in the left  popliteal trifurcation vessels were extremely small but had no stenosis.  This time we plan to return for a long endarterectomy of the right common femoral artery with a pericardial patch closure.  This might have to extend to the distal portion of the external iliac on the right.  At that same operation we will probably proceed with catheter-based means with angioplasty and stenting of the right superficial femoral artery.  If there is any question at that time about the left common femoral retrograde hand-held injection from the right side would more elucidate the left common femoral but for now I believe it is just occluded because of the sheath.     Assessment / Plan:  Diagnoses and all orders for this visit:    1. PAD (peripheral artery disease) (HCC) (Primary)    2. Carotid stenosis, asymptomatic, right    3. Carotid stenosis, left s/p left CEA 2019     Shannon Chan is a 61 y.o. female with a history of hypertension, hyperlipidemia, ongoing tobacco use, CAD s/p stenting, bilateral carotid stenosis (50% on right preoperatively) s/p left carotid endarterectomy on 4/2/2019 with Dr. Quinn and PVD s/p AA with runoff on 3/8/2022 with Dr. Quinn.  Discussed findings of AA with runoff with Dr. Quinn's recommendations to proceed with endarterectomy of the right common femoral artery with pericardial patch closure, possible extension into the right external iliac with possible right SFA angioplasty/stent and possible left common femoral arteriogram.  Risks of surgery were discussed with the patient including: bleeding, infection, limb loss, kidney damage, CVA, MI, or death.  Patient understands risks and agrees to proceed and will see Dr. Quinn in the preoperative area.  Patient is on DAPT/statin therapy.  Again, have counseled patient on the importance of smoking cessation.  We will place patient on Dr. Quinn's pending list.  Prior to scheduling surgery, will obtain repeat carotid duplex with the last  being in 2019 with a history of right 50% carotid stenosis and left CEA in 2019 lost to follow-up.  She has bilateral carotid bruits and denies any TIA/CVA symptoms.  Patient is in agreement with plan.    Kelle Hogan [facility] Cardiothoracic Surgery

## 2022-04-05 DIAGNOSIS — I65.22 CAROTID STENOSIS, LEFT: Primary | ICD-10-CM

## 2022-04-12 ENCOUNTER — HOSPITAL ENCOUNTER (OUTPATIENT)
Dept: CARDIOLOGY | Facility: HOSPITAL | Age: 62
Discharge: HOME | End: 2022-04-12
Admitting: NURSE PRACTITIONER

## 2022-04-12 VITALS — BODY MASS INDEX: 18.44 KG/M2 | HEIGHT: 63 IN | WEIGHT: 104.06 LBS

## 2022-04-12 DIAGNOSIS — I65.22 CAROTID STENOSIS, LEFT: ICD-10-CM

## 2022-04-12 LAB
BH CV XLRA MEAS LEFT DIST CCA EDV: 30.6 CM/SEC
BH CV XLRA MEAS LEFT DIST CCA PSV: 80.6 CM/SEC
BH CV XLRA MEAS LEFT DIST ICA EDV: 34 CM/SEC
BH CV XLRA MEAS LEFT DIST ICA PSV: 70.3 CM/SEC
BH CV XLRA MEAS LEFT ICA/CCA RATIO: 0.94
BH CV XLRA MEAS LEFT MID CCA EDV: 40.7 CM/SEC
BH CV XLRA MEAS LEFT MID CCA PSV: 99.2 CM/SEC
BH CV XLRA MEAS LEFT MID ICA EDV: 41 CM/SEC
BH CV XLRA MEAS LEFT MID ICA PSV: 78.5 CM/SEC
BH CV XLRA MEAS LEFT PROX CCA EDV: 36.5 CM/SEC
BH CV XLRA MEAS LEFT PROX CCA PSV: 100.9 CM/SEC
BH CV XLRA MEAS LEFT PROX ECA EDV: 32.1 CM/SEC
BH CV XLRA MEAS LEFT PROX ECA PSV: 135.2 CM/SEC
BH CV XLRA MEAS LEFT PROX ICA EDV: 43.4 CM/SEC
BH CV XLRA MEAS LEFT PROX ICA PSV: 93.4 CM/SEC
BH CV XLRA MEAS LEFT PROX SCLA EDV: 0 CM/SEC
BH CV XLRA MEAS LEFT PROX SCLA PSV: 205.8 CM/SEC
BH CV XLRA MEAS LEFT VERTEBRAL A EDV: 29.4 CM/SEC
BH CV XLRA MEAS LEFT VERTEBRAL A PSV: 99.8 CM/SEC
BH CV XLRA MEAS RIGHT DIST CCA EDV: 14.7 CM/SEC
BH CV XLRA MEAS RIGHT DIST CCA PSV: 54.7 CM/SEC
BH CV XLRA MEAS RIGHT DIST ICA EDV: 23.5 CM/SEC
BH CV XLRA MEAS RIGHT DIST ICA PSV: 79.7 CM/SEC
BH CV XLRA MEAS RIGHT ICA/CCA RATIO: 1.21
BH CV XLRA MEAS RIGHT MID CCA EDV: 16.4 CM/SEC
BH CV XLRA MEAS RIGHT MID CCA PSV: 101.2 CM/SEC
BH CV XLRA MEAS RIGHT MID ICA EDV: 34.8 CM/SEC
BH CV XLRA MEAS RIGHT MID ICA PSV: 122.6 CM/SEC
BH CV XLRA MEAS RIGHT PROX CCA EDV: 16.1 CM/SEC
BH CV XLRA MEAS RIGHT PROX CCA PSV: 95.4 CM/SEC
BH CV XLRA MEAS RIGHT PROX ECA EDV: 85.7 CM/SEC
BH CV XLRA MEAS RIGHT PROX ECA PSV: 447.2 CM/SEC
BH CV XLRA MEAS RIGHT PROX ICA EDV: 25 CM/SEC
BH CV XLRA MEAS RIGHT PROX ICA PSV: 81.2 CM/SEC
BH CV XLRA MEAS RIGHT PROX SCLA EDV: 13.7 CM/SEC
BH CV XLRA MEAS RIGHT PROX SCLA PSV: 188.6 CM/SEC
BH CV XLRA MEAS RIGHT VERTEBRAL A EDV: 28 CM/SEC
BH CV XLRA MEAS RIGHT VERTEBRAL A PSV: 75.5 CM/SEC

## 2022-04-12 PROCEDURE — 93880 EXTRACRANIAL BILAT STUDY: CPT | Performed by: INTERNAL MEDICINE

## 2022-04-12 PROCEDURE — 93880 EXTRACRANIAL BILAT STUDY: CPT

## 2022-04-19 ENCOUNTER — TELEPHONE (OUTPATIENT)
Dept: CARDIAC SURGERY | Facility: CLINIC | Age: 62
End: 2022-04-19

## 2022-04-21 DIAGNOSIS — I73.9 PAD (PERIPHERAL ARTERY DISEASE): Primary | ICD-10-CM

## 2022-04-25 ENCOUNTER — PREP FOR SURGERY (OUTPATIENT)
Dept: OTHER | Facility: HOSPITAL | Age: 62
End: 2022-04-25

## 2022-04-25 DIAGNOSIS — I73.9 PAD (PERIPHERAL ARTERY DISEASE): Primary | ICD-10-CM

## 2022-04-25 RX ORDER — NICOTINE POLACRILEX 4 MG
15 LOZENGE BUCCAL
Status: CANCELLED | OUTPATIENT
Start: 2022-04-25

## 2022-04-25 RX ORDER — SODIUM CHLORIDE 0.9 % (FLUSH) 0.9 %
30 SYRINGE (ML) INJECTION ONCE AS NEEDED
Status: CANCELLED | OUTPATIENT
Start: 2022-04-25

## 2022-04-25 RX ORDER — CHLORHEXIDINE GLUCONATE 500 MG/1
1 CLOTH TOPICAL EVERY 12 HOURS PRN
Status: CANCELLED | OUTPATIENT
Start: 2022-04-28

## 2022-04-25 RX ORDER — SODIUM CHLORIDE 9 MG/ML
30 INJECTION, SOLUTION INTRAVENOUS CONTINUOUS PRN
Status: CANCELLED | OUTPATIENT
Start: 2022-04-25

## 2022-04-25 RX ORDER — DEXTROSE MONOHYDRATE 25 G/50ML
10-50 INJECTION, SOLUTION INTRAVENOUS
Status: CANCELLED | OUTPATIENT
Start: 2022-04-25

## 2022-04-28 ENCOUNTER — HOSPITAL ENCOUNTER (OUTPATIENT)
Dept: GENERAL RADIOLOGY | Facility: HOSPITAL | Age: 62
Discharge: HOME | End: 2022-04-28

## 2022-04-28 ENCOUNTER — PRE-ADMISSION TESTING (OUTPATIENT)
Dept: PREADMISSION TESTING | Facility: HOSPITAL | Age: 62
End: 2022-04-28

## 2022-04-28 ENCOUNTER — ANESTHESIA EVENT (OUTPATIENT)
Dept: PERIOP | Facility: HOSPITAL | Age: 62
End: 2022-04-28

## 2022-04-28 VITALS — BODY MASS INDEX: 18.48 KG/M2 | WEIGHT: 104.28 LBS | HEIGHT: 63 IN

## 2022-04-28 DIAGNOSIS — I73.9 PAD (PERIPHERAL ARTERY DISEASE): ICD-10-CM

## 2022-04-28 DIAGNOSIS — I73.9 PVD (PERIPHERAL VASCULAR DISEASE): ICD-10-CM

## 2022-04-28 LAB
ABO GROUP BLD: NORMAL
AMPHET+METHAMPHET UR QL: NEGATIVE
AMPHETAMINES UR QL: NEGATIVE
ANION GAP SERPL CALCULATED.3IONS-SCNC: 16 MMOL/L (ref 5–15)
APTT PPP: 26.7 SECONDS (ref 22–39)
BARBITURATES UR QL SCN: NEGATIVE
BENZODIAZ UR QL SCN: NEGATIVE
BLD GP AB SCN SERPL QL: NEGATIVE
BUN SERPL-MCNC: 4 MG/DL (ref 8–23)
BUN/CREAT SERPL: 6.7 (ref 7–25)
BUPRENORPHINE SERPL-MCNC: NEGATIVE NG/ML
CALCIUM SPEC-SCNC: 10.8 MG/DL (ref 8.6–10.5)
CANNABINOIDS SERPL QL: POSITIVE
CHLORIDE SERPL-SCNC: 96 MMOL/L (ref 98–107)
CO2 SERPL-SCNC: 23 MMOL/L (ref 22–29)
COCAINE UR QL: NEGATIVE
CREAT SERPL-MCNC: 0.6 MG/DL (ref 0.57–1)
DEPRECATED RDW RBC AUTO: 40.9 FL (ref 37–54)
EGFRCR SERPLBLD CKD-EPI 2021: 102.9 ML/MIN/1.73
ERYTHROCYTE [DISTWIDTH] IN BLOOD BY AUTOMATED COUNT: 11.9 % (ref 12.3–15.4)
GLUCOSE SERPL-MCNC: 91 MG/DL (ref 65–99)
HBA1C MFR BLD: 5 % (ref 4.8–5.6)
HCT VFR BLD AUTO: 41.3 % (ref 34–46.6)
HGB BLD-MCNC: 13.9 G/DL (ref 12–15.9)
INR PPP: 0.89 (ref 0.84–1.13)
MCH RBC QN AUTO: 31.2 PG (ref 26.6–33)
MCHC RBC AUTO-ENTMCNC: 33.7 G/DL (ref 31.5–35.7)
MCV RBC AUTO: 92.6 FL (ref 79–97)
METHADONE UR QL SCN: NEGATIVE
OPIATES UR QL: NEGATIVE
OXYCODONE UR QL SCN: NEGATIVE
PCP UR QL SCN: NEGATIVE
PLATELET # BLD AUTO: 278 10*3/MM3 (ref 140–450)
PMV BLD AUTO: 10.1 FL (ref 6–12)
POTASSIUM SERPL-SCNC: 4.2 MMOL/L (ref 3.5–5.2)
PROPOXYPH UR QL: NEGATIVE
PROTHROMBIN TIME: 11.9 SECONDS (ref 11.4–14.4)
RBC # BLD AUTO: 4.46 10*6/MM3 (ref 3.77–5.28)
RH BLD: POSITIVE
SARS-COV-2 RNA PNL SPEC NAA+PROBE: NOT DETECTED
SODIUM SERPL-SCNC: 135 MMOL/L (ref 136–145)
T&S EXPIRATION DATE: NORMAL
TRICYCLICS UR QL SCN: NEGATIVE
WBC NRBC COR # BLD: 10.83 10*3/MM3 (ref 3.4–10.8)

## 2022-04-28 PROCEDURE — 86850 RBC ANTIBODY SCREEN: CPT

## 2022-04-28 PROCEDURE — 80305 DRUG TEST PRSMV DIR OPT OBS: CPT | Performed by: PHYSICIAN ASSISTANT

## 2022-04-28 PROCEDURE — 86901 BLOOD TYPING SEROLOGIC RH(D): CPT

## 2022-04-28 PROCEDURE — 85027 COMPLETE CBC AUTOMATED: CPT

## 2022-04-28 PROCEDURE — 85610 PROTHROMBIN TIME: CPT

## 2022-04-28 PROCEDURE — C9803 HOPD COVID-19 SPEC COLLECT: HCPCS

## 2022-04-28 PROCEDURE — 80306 DRUG TEST PRSMV INSTRMNT: CPT | Performed by: PHYSICIAN ASSISTANT

## 2022-04-28 PROCEDURE — 86900 BLOOD TYPING SEROLOGIC ABO: CPT

## 2022-04-28 PROCEDURE — 85730 THROMBOPLASTIN TIME PARTIAL: CPT

## 2022-04-28 PROCEDURE — 71046 X-RAY EXAM CHEST 2 VIEWS: CPT

## 2022-04-28 PROCEDURE — U0004 COV-19 TEST NON-CDC HGH THRU: HCPCS

## 2022-04-28 PROCEDURE — 86923 COMPATIBILITY TEST ELECTRIC: CPT

## 2022-04-28 PROCEDURE — 80048 BASIC METABOLIC PNL TOTAL CA: CPT

## 2022-04-28 PROCEDURE — 36415 COLL VENOUS BLD VENIPUNCTURE: CPT

## 2022-04-28 PROCEDURE — 83036 HEMOGLOBIN GLYCOSYLATED A1C: CPT

## 2022-04-28 RX ORDER — SODIUM CHLORIDE 0.9 % (FLUSH) 0.9 %
10 SYRINGE (ML) INJECTION EVERY 12 HOURS SCHEDULED
Status: CANCELLED | OUTPATIENT
Start: 2022-04-28

## 2022-04-28 RX ORDER — FAMOTIDINE 10 MG/ML
20 INJECTION, SOLUTION INTRAVENOUS ONCE
Status: CANCELLED | OUTPATIENT
Start: 2022-04-28 | End: 2022-04-28

## 2022-04-28 RX ORDER — CHLORHEXIDINE GLUCONATE 500 MG/1
1 CLOTH TOPICAL EVERY 12 HOURS PRN
Status: DISCONTINUED | OUTPATIENT
Start: 2022-04-28 | End: 2022-05-23

## 2022-04-29 ENCOUNTER — HOSPITAL ENCOUNTER (INPATIENT)
Facility: HOSPITAL | Age: 62
LOS: 2 days | Discharge: HOME | End: 2022-05-01
Attending: THORACIC SURGERY (CARDIOTHORACIC VASCULAR SURGERY) | Admitting: THORACIC SURGERY (CARDIOTHORACIC VASCULAR SURGERY)

## 2022-04-29 ENCOUNTER — APPOINTMENT (OUTPATIENT)
Dept: GENERAL RADIOLOGY | Facility: HOSPITAL | Age: 62
End: 2022-04-29

## 2022-04-29 ENCOUNTER — ANESTHESIA (OUTPATIENT)
Dept: PERIOP | Facility: HOSPITAL | Age: 62
End: 2022-04-29

## 2022-04-29 DIAGNOSIS — I73.9 PAD (PERIPHERAL ARTERY DISEASE): ICD-10-CM

## 2022-04-29 PROBLEM — K55.1 SUPERIOR MESENTERIC ARTERY STENOSIS (HCC): Status: ACTIVE | Noted: 2022-04-29

## 2022-04-29 PROBLEM — F10.90 ALCOHOL USE: Status: ACTIVE | Noted: 2022-04-29

## 2022-04-29 PROBLEM — Z98.890 H/O ENDARTERECTOMY: Status: ACTIVE | Noted: 2022-04-29

## 2022-04-29 PROBLEM — F10.10 ALCOHOL ABUSE: Status: ACTIVE | Noted: 2022-04-29

## 2022-04-29 PROBLEM — I70.201 FEMORAL ARTERY OCCLUSION, RIGHT: Status: ACTIVE | Noted: 2022-04-29

## 2022-04-29 PROBLEM — J43.2 CENTRILOBULAR EMPHYSEMA: Status: ACTIVE | Noted: 2022-04-29

## 2022-04-29 PROBLEM — Z78.9 ALCOHOL USE: Status: ACTIVE | Noted: 2022-04-29

## 2022-04-29 LAB
COTININE UR QL SCN: POSITIVE NG/ML
Lab: ABNORMAL

## 2022-04-29 PROCEDURE — 35351 RECHANNELING OF ARTERY: CPT | Performed by: THORACIC SURGERY (CARDIOTHORACIC VASCULAR SURGERY)

## 2022-04-29 PROCEDURE — 25010000002 MORPHINE PER 10 MG: Performed by: THORACIC SURGERY (CARDIOTHORACIC VASCULAR SURGERY)

## 2022-04-29 PROCEDURE — C1894 INTRO/SHEATH, NON-LASER: HCPCS | Performed by: THORACIC SURGERY (CARDIOTHORACIC VASCULAR SURGERY)

## 2022-04-29 PROCEDURE — C1887 CATHETER, GUIDING: HCPCS | Performed by: THORACIC SURGERY (CARDIOTHORACIC VASCULAR SURGERY)

## 2022-04-29 PROCEDURE — 25010000002 GENTAMICIN PER 80 MG: Performed by: THORACIC SURGERY (CARDIOTHORACIC VASCULAR SURGERY)

## 2022-04-29 PROCEDURE — 25010000002 ONDANSETRON PER 1 MG: Performed by: NURSE ANESTHETIST, CERTIFIED REGISTERED

## 2022-04-29 PROCEDURE — 88304 TISSUE EXAM BY PATHOLOGIST: CPT | Performed by: THORACIC SURGERY (CARDIOTHORACIC VASCULAR SURGERY)

## 2022-04-29 PROCEDURE — 25010000002 DEXAMETHASONE PER 1 MG: Performed by: NURSE ANESTHETIST, CERTIFIED REGISTERED

## 2022-04-29 PROCEDURE — 25010000002 PROTAMINE SULFATE PER 10 MG: Performed by: NURSE ANESTHETIST, CERTIFIED REGISTERED

## 2022-04-29 PROCEDURE — 35372 RECHANNELING OF ARTERY: CPT | Performed by: PHYSICIAN ASSISTANT

## 2022-04-29 PROCEDURE — 04CK0ZZ EXTIRPATION OF MATTER FROM RIGHT FEMORAL ARTERY, OPEN APPROACH: ICD-10-PCS | Performed by: THORACIC SURGERY (CARDIOTHORACIC VASCULAR SURGERY)

## 2022-04-29 PROCEDURE — 35372 RECHANNELING OF ARTERY: CPT | Performed by: THORACIC SURGERY (CARDIOTHORACIC VASCULAR SURGERY)

## 2022-04-29 PROCEDURE — 35351 RECHANNELING OF ARTERY: CPT | Performed by: PHYSICIAN ASSISTANT

## 2022-04-29 PROCEDURE — C1768 GRAFT, VASCULAR: HCPCS | Performed by: THORACIC SURGERY (CARDIOTHORACIC VASCULAR SURGERY)

## 2022-04-29 PROCEDURE — 0 LIDOCAINE 1 % SOLUTION: Performed by: THORACIC SURGERY (CARDIOTHORACIC VASCULAR SURGERY)

## 2022-04-29 PROCEDURE — 25010000002 HEPARIN (PORCINE) PER 1000 UNITS: Performed by: NURSE ANESTHETIST, CERTIFIED REGISTERED

## 2022-04-29 PROCEDURE — 25010000002 PROPOFOL 10 MG/ML EMULSION: Performed by: NURSE ANESTHETIST, CERTIFIED REGISTERED

## 2022-04-29 PROCEDURE — 99233 SBSQ HOSP IP/OBS HIGH 50: CPT | Performed by: INTERNAL MEDICINE

## 2022-04-29 PROCEDURE — 04CH0ZZ EXTIRPATION OF MATTER FROM RIGHT EXTERNAL ILIAC ARTERY, OPEN APPROACH: ICD-10-PCS | Performed by: THORACIC SURGERY (CARDIOTHORACIC VASCULAR SURGERY)

## 2022-04-29 PROCEDURE — 25010000002 FENTANYL CITRATE (PF) 50 MCG/ML SOLUTION

## 2022-04-29 PROCEDURE — C2615 SEALANT, PULMONARY, LIQUID: HCPCS | Performed by: THORACIC SURGERY (CARDIOTHORACIC VASCULAR SURGERY)

## 2022-04-29 PROCEDURE — C1769 GUIDE WIRE: HCPCS | Performed by: THORACIC SURGERY (CARDIOTHORACIC VASCULAR SURGERY)

## 2022-04-29 PROCEDURE — 04UK0JZ SUPPLEMENT RIGHT FEMORAL ARTERY WITH SYNTHETIC SUBSTITUTE, OPEN APPROACH: ICD-10-PCS | Performed by: THORACIC SURGERY (CARDIOTHORACIC VASCULAR SURGERY)

## 2022-04-29 PROCEDURE — 25010000002 CEFAZOLIN IN DEXTROSE 2-4 GM/100ML-% SOLUTION

## 2022-04-29 PROCEDURE — 25010000002 PHENYLEPHRINE 10 MG/ML SOLUTION: Performed by: NURSE ANESTHETIST, CERTIFIED REGISTERED

## 2022-04-29 PROCEDURE — 25010000002 FENTANYL CITRATE (PF) 50 MCG/ML SOLUTION: Performed by: NURSE ANESTHETIST, CERTIFIED REGISTERED

## 2022-04-29 PROCEDURE — 25010000002 CEFAZOLIN PER 500 MG: Performed by: THORACIC SURGERY (CARDIOTHORACIC VASCULAR SURGERY)

## 2022-04-29 PROCEDURE — 25010000002 HEPARIN (PORCINE) PER 1000 UNITS: Performed by: THORACIC SURGERY (CARDIOTHORACIC VASCULAR SURGERY)

## 2022-04-29 PROCEDURE — 25010000002 CEFAZOLIN IN DEXTROSE 2-4 GM/100ML-% SOLUTION: Performed by: PHYSICIAN ASSISTANT

## 2022-04-29 PROCEDURE — 88311 DECALCIFY TISSUE: CPT | Performed by: THORACIC SURGERY (CARDIOTHORACIC VASCULAR SURGERY)

## 2022-04-29 DEVICE — CLIP LIGAT VASC HORIZON TI LG ORNG 6CT: Type: IMPLANTABLE DEVICE | Site: ARTERY FEMORAL | Status: FUNCTIONAL

## 2022-04-29 DEVICE — PROGEL, PLEURAL AIR LEAK SEALANT, 4 ML KIT
Type: IMPLANTABLE DEVICE | Site: ARTERY FEMORAL | Status: FUNCTIONAL
Brand: PROGEL

## 2022-04-29 DEVICE — VASCU-GUARD PERIPHERAL VASCULAR PATCH IS PREPARED FROM BOVINE PERICARDIUM WHICH IS CROSS-LINKED WITH GLUTARALDEHYDE. THE PERICARDIUM IS PROCURED FROM CATTLE ORIGINATING IN THE UNITED STATES. VASCU-GUARD PERIPHERAL VASCULAR PATCH IS CHEMICALLY STERILIZED USING ETHANOL AND PROPYLENE OXIDE. VASCU-GUARD PERIPHERAL VASCULAR PATCH HAS BEEN TREATED WITH 1 MOLAR SODIUM HYDROXIDE FOR A MINIMUM OF 60 MINUTES AT 20 - 25 C.  VASCU-GUARD PERIPHERAL VASCULAR PATCH IS PACKAGED IN A CONTAINER FILLED WITH STERILE, NON-PYROGENIC WATER CONTAINING PROPYLENE OXIDE. THE CONTENTS OF THE UNOPENED, UNDAMAGED CONTAINER ARE STERILE.
Type: IMPLANTABLE DEVICE | Site: ARTERY FEMORAL | Status: FUNCTIONAL
Brand: VASCU-GUARD PERIPHERAL VASCULAR PATCH

## 2022-04-29 RX ORDER — HYDROMORPHONE HYDROCHLORIDE 1 MG/ML
0.5 INJECTION, SOLUTION INTRAMUSCULAR; INTRAVENOUS; SUBCUTANEOUS
Status: DISCONTINUED | OUTPATIENT
Start: 2022-04-29 | End: 2022-04-29 | Stop reason: HOSPADM

## 2022-04-29 RX ORDER — FAMOTIDINE 20 MG/1
20 TABLET, FILM COATED ORAL ONCE
Status: COMPLETED | OUTPATIENT
Start: 2022-04-29 | End: 2022-04-29

## 2022-04-29 RX ORDER — SODIUM CHLORIDE 0.9 % (FLUSH) 0.9 %
10 SYRINGE (ML) INJECTION AS NEEDED
Status: DISCONTINUED | OUTPATIENT
Start: 2022-04-29 | End: 2022-04-29 | Stop reason: HOSPADM

## 2022-04-29 RX ORDER — NICOTINE POLACRILEX 4 MG
15 LOZENGE BUCCAL
Status: DISCONTINUED | OUTPATIENT
Start: 2022-04-29 | End: 2022-04-29

## 2022-04-29 RX ORDER — ONDANSETRON 4 MG/1
4 TABLET, FILM COATED ORAL EVERY 6 HOURS PRN
Status: DISCONTINUED | OUTPATIENT
Start: 2022-04-29 | End: 2022-05-01 | Stop reason: HOSPADM

## 2022-04-29 RX ORDER — HYDROCODONE BITARTRATE AND ACETAMINOPHEN 5; 325 MG/1; MG/1
1 TABLET ORAL ONCE AS NEEDED
Status: DISCONTINUED | OUTPATIENT
Start: 2022-04-29 | End: 2022-04-29 | Stop reason: HOSPADM

## 2022-04-29 RX ORDER — MAGNESIUM HYDROXIDE 1200 MG/15ML
LIQUID ORAL AS NEEDED
Status: DISCONTINUED | OUTPATIENT
Start: 2022-04-29 | End: 2022-04-29 | Stop reason: HOSPADM

## 2022-04-29 RX ORDER — DROPERIDOL 2.5 MG/ML
0.62 INJECTION, SOLUTION INTRAMUSCULAR; INTRAVENOUS AS NEEDED
Status: DISCONTINUED | OUTPATIENT
Start: 2022-04-29 | End: 2022-04-29 | Stop reason: HOSPADM

## 2022-04-29 RX ORDER — IPRATROPIUM BROMIDE AND ALBUTEROL SULFATE 2.5; .5 MG/3ML; MG/3ML
3 SOLUTION RESPIRATORY (INHALATION) EVERY 4 HOURS PRN
Status: DISCONTINUED | OUTPATIENT
Start: 2022-04-29 | End: 2022-05-01 | Stop reason: HOSPADM

## 2022-04-29 RX ORDER — FENTANYL CITRATE 50 UG/ML
50 INJECTION, SOLUTION INTRAMUSCULAR; INTRAVENOUS
Status: DISCONTINUED | OUTPATIENT
Start: 2022-04-29 | End: 2022-04-29 | Stop reason: HOSPADM

## 2022-04-29 RX ORDER — FENTANYL CITRATE 50 UG/ML
INJECTION, SOLUTION INTRAMUSCULAR; INTRAVENOUS AS NEEDED
Status: DISCONTINUED | OUTPATIENT
Start: 2022-04-29 | End: 2022-04-29 | Stop reason: SURG

## 2022-04-29 RX ORDER — PROMETHAZINE HYDROCHLORIDE 25 MG/1
25 SUPPOSITORY RECTAL ONCE AS NEEDED
Status: DISCONTINUED | OUTPATIENT
Start: 2022-04-29 | End: 2022-04-29 | Stop reason: HOSPADM

## 2022-04-29 RX ORDER — PROMETHAZINE HYDROCHLORIDE 25 MG/1
25 TABLET ORAL ONCE AS NEEDED
Status: DISCONTINUED | OUTPATIENT
Start: 2022-04-29 | End: 2022-04-29 | Stop reason: HOSPADM

## 2022-04-29 RX ORDER — LIDOCAINE HYDROCHLORIDE 10 MG/ML
INJECTION, SOLUTION INFILTRATION; PERINEURAL AS NEEDED
Status: DISCONTINUED | OUTPATIENT
Start: 2022-04-29 | End: 2022-04-29 | Stop reason: HOSPADM

## 2022-04-29 RX ORDER — MIDAZOLAM HYDROCHLORIDE 1 MG/ML
1 INJECTION INTRAMUSCULAR; INTRAVENOUS
Status: DISCONTINUED | OUTPATIENT
Start: 2022-04-29 | End: 2022-04-29 | Stop reason: HOSPADM

## 2022-04-29 RX ORDER — LIDOCAINE HYDROCHLORIDE 10 MG/ML
0.5 INJECTION, SOLUTION EPIDURAL; INFILTRATION; INTRACAUDAL; PERINEURAL ONCE AS NEEDED
Status: COMPLETED | OUTPATIENT
Start: 2022-04-29 | End: 2022-04-29

## 2022-04-29 RX ORDER — HYDROCODONE BITARTRATE AND ACETAMINOPHEN 5; 325 MG/1; MG/1
1 TABLET ORAL EVERY 6 HOURS PRN
Status: DISCONTINUED | OUTPATIENT
Start: 2022-04-29 | End: 2022-05-01 | Stop reason: HOSPADM

## 2022-04-29 RX ORDER — SODIUM CHLORIDE 0.9 % (FLUSH) 0.9 %
30 SYRINGE (ML) INJECTION ONCE AS NEEDED
Status: DISCONTINUED | OUTPATIENT
Start: 2022-04-29 | End: 2022-04-29 | Stop reason: HOSPADM

## 2022-04-29 RX ORDER — PANTOPRAZOLE SODIUM 40 MG/1
40 TABLET, DELAYED RELEASE ORAL EVERY MORNING
Status: DISCONTINUED | OUTPATIENT
Start: 2022-04-30 | End: 2022-05-01 | Stop reason: HOSPADM

## 2022-04-29 RX ORDER — BUPIVACAINE HYDROCHLORIDE 5 MG/ML
INJECTION, SOLUTION EPIDURAL; INTRACAUDAL AS NEEDED
Status: DISCONTINUED | OUTPATIENT
Start: 2022-04-29 | End: 2022-04-29 | Stop reason: HOSPADM

## 2022-04-29 RX ORDER — DROPERIDOL 2.5 MG/ML
0.62 INJECTION, SOLUTION INTRAMUSCULAR; INTRAVENOUS ONCE AS NEEDED
Status: DISCONTINUED | OUTPATIENT
Start: 2022-04-29 | End: 2022-04-29 | Stop reason: HOSPADM

## 2022-04-29 RX ORDER — IPRATROPIUM BROMIDE AND ALBUTEROL SULFATE 2.5; .5 MG/3ML; MG/3ML
3 SOLUTION RESPIRATORY (INHALATION) ONCE AS NEEDED
Status: DISCONTINUED | OUTPATIENT
Start: 2022-04-29 | End: 2022-04-29 | Stop reason: HOSPADM

## 2022-04-29 RX ORDER — MORPHINE SULFATE 4 MG/ML
4 INJECTION, SOLUTION INTRAMUSCULAR; INTRAVENOUS
Status: ACTIVE | OUTPATIENT
Start: 2022-04-29 | End: 2022-04-30

## 2022-04-29 RX ORDER — PROTAMINE SULFATE 10 MG/ML
INJECTION, SOLUTION INTRAVENOUS AS NEEDED
Status: DISCONTINUED | OUTPATIENT
Start: 2022-04-29 | End: 2022-04-29 | Stop reason: SURG

## 2022-04-29 RX ORDER — SODIUM CHLORIDE 9 MG/ML
30 INJECTION, SOLUTION INTRAVENOUS CONTINUOUS PRN
Status: DISCONTINUED | OUTPATIENT
Start: 2022-04-29 | End: 2022-05-01 | Stop reason: HOSPADM

## 2022-04-29 RX ORDER — HYDRALAZINE HYDROCHLORIDE 20 MG/ML
5 INJECTION INTRAMUSCULAR; INTRAVENOUS
Status: DISCONTINUED | OUTPATIENT
Start: 2022-04-29 | End: 2022-04-29 | Stop reason: HOSPADM

## 2022-04-29 RX ORDER — LABETALOL HYDROCHLORIDE 5 MG/ML
5 INJECTION, SOLUTION INTRAVENOUS
Status: DISCONTINUED | OUTPATIENT
Start: 2022-04-29 | End: 2022-04-29 | Stop reason: HOSPADM

## 2022-04-29 RX ORDER — MORPHINE SULFATE 2 MG/ML
2 INJECTION, SOLUTION INTRAMUSCULAR; INTRAVENOUS
Status: DISPENSED | OUTPATIENT
Start: 2022-04-29 | End: 2022-04-30

## 2022-04-29 RX ORDER — ROSUVASTATIN CALCIUM 20 MG/1
20 TABLET, COATED ORAL EVERY MORNING
Status: DISCONTINUED | OUTPATIENT
Start: 2022-04-30 | End: 2022-05-01 | Stop reason: HOSPADM

## 2022-04-29 RX ORDER — SODIUM CHLORIDE 0.9 % (FLUSH) 0.9 %
3 SYRINGE (ML) INJECTION EVERY 12 HOURS SCHEDULED
Status: DISCONTINUED | OUTPATIENT
Start: 2022-04-29 | End: 2022-04-29 | Stop reason: HOSPADM

## 2022-04-29 RX ORDER — CLOPIDOGREL BISULFATE 75 MG/1
75 TABLET ORAL EVERY MORNING
Status: DISCONTINUED | OUTPATIENT
Start: 2022-04-30 | End: 2022-05-01 | Stop reason: HOSPADM

## 2022-04-29 RX ORDER — CEFAZOLIN SODIUM 2 G/100ML
2 INJECTION, SOLUTION INTRAVENOUS EVERY 8 HOURS
Status: COMPLETED | OUTPATIENT
Start: 2022-04-29 | End: 2022-04-30

## 2022-04-29 RX ORDER — ONDANSETRON 2 MG/ML
4 INJECTION INTRAMUSCULAR; INTRAVENOUS ONCE AS NEEDED
Status: DISCONTINUED | OUTPATIENT
Start: 2022-04-29 | End: 2022-04-29 | Stop reason: HOSPADM

## 2022-04-29 RX ORDER — SODIUM CHLORIDE 450 MG/100ML
50 INJECTION, SOLUTION INTRAVENOUS CONTINUOUS
Status: DISCONTINUED | OUTPATIENT
Start: 2022-04-29 | End: 2022-04-30

## 2022-04-29 RX ORDER — LIDOCAINE HYDROCHLORIDE 20 MG/ML
INJECTION, SOLUTION INFILTRATION; PERINEURAL AS NEEDED
Status: DISCONTINUED | OUTPATIENT
Start: 2022-04-29 | End: 2022-04-29 | Stop reason: SURG

## 2022-04-29 RX ORDER — NALOXONE HCL 0.4 MG/ML
0.4 VIAL (ML) INJECTION AS NEEDED
Status: DISCONTINUED | OUTPATIENT
Start: 2022-04-29 | End: 2022-04-29 | Stop reason: HOSPADM

## 2022-04-29 RX ORDER — ASPIRIN 81 MG/1
81 TABLET ORAL DAILY
Status: DISCONTINUED | OUTPATIENT
Start: 2022-04-29 | End: 2022-05-01 | Stop reason: HOSPADM

## 2022-04-29 RX ORDER — DEXAMETHASONE SODIUM PHOSPHATE 10 MG/ML
INJECTION INTRAMUSCULAR; INTRAVENOUS AS NEEDED
Status: DISCONTINUED | OUTPATIENT
Start: 2022-04-29 | End: 2022-04-29 | Stop reason: SURG

## 2022-04-29 RX ORDER — PHENYLEPHRINE HYDROCHLORIDE 10 MG/ML
INJECTION INTRAVENOUS AS NEEDED
Status: DISCONTINUED | OUTPATIENT
Start: 2022-04-29 | End: 2022-04-29 | Stop reason: SURG

## 2022-04-29 RX ORDER — SODIUM CHLORIDE, SODIUM LACTATE, POTASSIUM CHLORIDE, CALCIUM CHLORIDE 600; 310; 30; 20 MG/100ML; MG/100ML; MG/100ML; MG/100ML
9 INJECTION, SOLUTION INTRAVENOUS CONTINUOUS
Status: DISCONTINUED | OUTPATIENT
Start: 2022-04-29 | End: 2022-05-01 | Stop reason: HOSPADM

## 2022-04-29 RX ORDER — NICOTINE 21 MG/24HR
1 PATCH, TRANSDERMAL 24 HOURS TRANSDERMAL
Status: DISCONTINUED | OUTPATIENT
Start: 2022-04-29 | End: 2022-05-01 | Stop reason: HOSPADM

## 2022-04-29 RX ORDER — SODIUM CHLORIDE 0.9 % (FLUSH) 0.9 %
3-10 SYRINGE (ML) INJECTION AS NEEDED
Status: DISCONTINUED | OUTPATIENT
Start: 2022-04-29 | End: 2022-04-29 | Stop reason: HOSPADM

## 2022-04-29 RX ORDER — ONDANSETRON 2 MG/ML
INJECTION INTRAMUSCULAR; INTRAVENOUS AS NEEDED
Status: DISCONTINUED | OUTPATIENT
Start: 2022-04-29 | End: 2022-04-29 | Stop reason: SURG

## 2022-04-29 RX ORDER — ONDANSETRON 2 MG/ML
4 INJECTION INTRAMUSCULAR; INTRAVENOUS EVERY 6 HOURS PRN
Status: DISCONTINUED | OUTPATIENT
Start: 2022-04-29 | End: 2022-05-01 | Stop reason: HOSPADM

## 2022-04-29 RX ORDER — FENTANYL CITRATE 50 UG/ML
INJECTION, SOLUTION INTRAMUSCULAR; INTRAVENOUS
Status: COMPLETED
Start: 2022-04-29 | End: 2022-04-29

## 2022-04-29 RX ORDER — THROMBIN HUMAN AND FIBRINOGEN 2; 5.5 [USP'U]/1; MG/1
PATCH TOPICAL AS NEEDED
Status: DISCONTINUED | OUTPATIENT
Start: 2022-04-29 | End: 2022-04-29 | Stop reason: HOSPADM

## 2022-04-29 RX ORDER — PROPOFOL 10 MG/ML
VIAL (ML) INTRAVENOUS AS NEEDED
Status: DISCONTINUED | OUTPATIENT
Start: 2022-04-29 | End: 2022-04-29 | Stop reason: SURG

## 2022-04-29 RX ORDER — CEFAZOLIN SODIUM 2 G/100ML
2 INJECTION, SOLUTION INTRAVENOUS ONCE
Status: COMPLETED | OUTPATIENT
Start: 2022-04-29 | End: 2022-04-29

## 2022-04-29 RX ORDER — BISACODYL 10 MG
10 SUPPOSITORY, RECTAL RECTAL DAILY PRN
Status: DISCONTINUED | OUTPATIENT
Start: 2022-04-29 | End: 2022-05-01 | Stop reason: HOSPADM

## 2022-04-29 RX ORDER — AMLODIPINE BESYLATE 10 MG/1
10 TABLET ORAL EVERY MORNING
Status: DISCONTINUED | OUTPATIENT
Start: 2022-04-30 | End: 2022-05-01 | Stop reason: HOSPADM

## 2022-04-29 RX ORDER — ONDANSETRON 4 MG/1
4 TABLET, FILM COATED ORAL EVERY 6 HOURS PRN
Status: DISCONTINUED | OUTPATIENT
Start: 2022-04-29 | End: 2022-04-29 | Stop reason: SDUPTHER

## 2022-04-29 RX ORDER — HEPARIN SODIUM 1000 [USP'U]/ML
INJECTION, SOLUTION INTRAVENOUS; SUBCUTANEOUS AS NEEDED
Status: DISCONTINUED | OUTPATIENT
Start: 2022-04-29 | End: 2022-04-29 | Stop reason: SURG

## 2022-04-29 RX ORDER — DEXTROSE MONOHYDRATE 25 G/50ML
10-50 INJECTION, SOLUTION INTRAVENOUS
Status: DISCONTINUED | OUTPATIENT
Start: 2022-04-29 | End: 2022-04-29

## 2022-04-29 RX ADMIN — HYDROCODONE BITARTRATE AND ACETAMINOPHEN 1 TABLET: 5; 325 TABLET ORAL at 20:14

## 2022-04-29 RX ADMIN — HEPARIN SODIUM 5000 UNITS: 1000 INJECTION, SOLUTION INTRAVENOUS; SUBCUTANEOUS at 13:16

## 2022-04-29 RX ADMIN — PHENYLEPHRINE HYDROCHLORIDE 100 MCG: 10 INJECTION INTRAVENOUS at 13:05

## 2022-04-29 RX ADMIN — FENTANYL CITRATE 100 MCG: 50 INJECTION, SOLUTION INTRAMUSCULAR; INTRAVENOUS at 13:07

## 2022-04-29 RX ADMIN — SODIUM CHLORIDE 50 ML/HR: 4.5 INJECTION, SOLUTION INTRAVENOUS at 16:54

## 2022-04-29 RX ADMIN — Medication 1 PATCH: at 18:23

## 2022-04-29 RX ADMIN — NICARDIPINE HYDROCHLORIDE 2.5 MG/HR: 25 INJECTION, SOLUTION INTRAVENOUS at 16:59

## 2022-04-29 RX ADMIN — CEFAZOLIN SODIUM 2 G: 2 INJECTION, SOLUTION INTRAVENOUS at 20:01

## 2022-04-29 RX ADMIN — CEFAZOLIN SODIUM 2 G: 2 INJECTION, SOLUTION INTRAVENOUS at 12:51

## 2022-04-29 RX ADMIN — FAMOTIDINE 20 MG: 20 TABLET ORAL at 10:47

## 2022-04-29 RX ADMIN — PROPOFOL 150 MG: 10 INJECTION, EMULSION INTRAVENOUS at 12:49

## 2022-04-29 RX ADMIN — FENTANYL CITRATE 50 MCG: 0.05 INJECTION, SOLUTION INTRAMUSCULAR; INTRAVENOUS at 15:15

## 2022-04-29 RX ADMIN — LIDOCAINE HYDROCHLORIDE 50 MG: 20 INJECTION, SOLUTION INFILTRATION; PERINEURAL at 12:49

## 2022-04-29 RX ADMIN — PHENYLEPHRINE HYDROCHLORIDE 100 MCG: 10 INJECTION INTRAVENOUS at 14:20

## 2022-04-29 RX ADMIN — ONDANSETRON 4 MG: 2 INJECTION INTRAMUSCULAR; INTRAVENOUS at 13:42

## 2022-04-29 RX ADMIN — ASPIRIN 81 MG: 81 TABLET, COATED ORAL at 17:00

## 2022-04-29 RX ADMIN — LIDOCAINE HYDROCHLORIDE 0.5 ML: 10 INJECTION, SOLUTION EPIDURAL; INFILTRATION; INTRACAUDAL; PERINEURAL at 10:47

## 2022-04-29 RX ADMIN — MORPHINE SULFATE 2 MG: 2 INJECTION, SOLUTION INTRAMUSCULAR; INTRAVENOUS at 22:39

## 2022-04-29 RX ADMIN — PHENYLEPHRINE HYDROCHLORIDE 100 MCG: 10 INJECTION INTRAVENOUS at 13:42

## 2022-04-29 RX ADMIN — PROTAMINE SULFATE 50 MG: 10 INJECTION, SOLUTION INTRAVENOUS at 13:42

## 2022-04-29 RX ADMIN — DEXAMETHASONE SODIUM PHOSPHATE 4 MG: 10 INJECTION INTRAMUSCULAR; INTRAVENOUS at 12:52

## 2022-04-29 RX ADMIN — SODIUM CHLORIDE, POTASSIUM CHLORIDE, SODIUM LACTATE AND CALCIUM CHLORIDE 9 ML/HR: 600; 310; 30; 20 INJECTION, SOLUTION INTRAVENOUS at 10:47

## 2022-04-29 RX ADMIN — FENTANYL CITRATE 50 MCG: 50 INJECTION, SOLUTION INTRAMUSCULAR; INTRAVENOUS at 15:15

## 2022-04-29 NOTE — ANESTHESIA POSTPROCEDURE EVALUATION
Patient: Shannon Chan    Procedure Summary     Date: 04/29/22 Room / Location: [facility] OR 01 / [facility]BRID [facility]AWN    Anesthesia Start: 1246 Anesthesia Stop: 1436    Procedures:       ARTERIOGRAM OF RIGHT FERMORAL ARTERY WITH RUNOFFS AND PROFUNDAPLASTY (N/A Abdomen)      FEMORAL ENDARTERECTOMY (N/A ) Diagnosis:       PAD (peripheral artery disease) ([facility])      (PAD (peripheral artery disease) ([facility]) [I73.9])    Surgeons: Dallin Quinn MD Provider: Al Willoughby MD    Anesthesia Type: general ASA Status: 3          Anesthesia Type: general    Vitals  No vitals data found for the desired time range.          Post Anesthesia Care and Evaluation    Patient location during evaluation: PACU  Patient participation: complete - patient participated  Level of consciousness: awake and alert  Pain management: adequate  Airway patency: patent  Anesthetic complications: No anesthetic complications  PONV Status: none  Cardiovascular status: hemodynamically stable and acceptable  Respiratory status: nonlabored ventilation, acceptable and nasal cannula  Hydration status: acceptable

## 2022-04-29 NOTE — ANESTHESIA PROCEDURE NOTES
Airway  Urgency: elective    Date/Time: 4/29/2022 12:50 PM  Airway not difficult    General Information and Staff    Patient location during procedure: OR  Anesthesiologist: Jt Ludwig MD  CRNA/CAA: Seb Thomas CRNA    Indications and Patient Condition  Indications for airway management: airway protection    Preoxygenated: yes  Mask difficulty assessment: 1 - vent by mask    Final Airway Details  Final airway type: supraglottic airway      Successful airway: I-gel  Size 4    Number of attempts at approach: 1  Assessment: lips, teeth, and gum same as pre-op    Additional Comments  LMA placed without difficulty, ventilation with assist, equal breath sounds and symmetric chest rise and fall

## 2022-04-29 NOTE — ANESTHESIA PREPROCEDURE EVALUATION
Anesthesia Evaluation     Patient summary reviewed and Nursing notes reviewed   no history of anesthetic complications:  NPO Solid Status: > 8 hours  NPO Liquid Status: > 2 hours           Airway   Mallampati: II  TM distance: >3 FB  Neck ROM: full  No difficulty expected  Dental    (+) upper dentures    Pulmonary - normal exam    breath sounds clear to auscultation  (+) a smoker (1/2 ppd x 50 yrs) Current,   Cardiovascular - normal exam    ECG reviewed  PT is on anticoagulation therapy  Rhythm: regular  Rate: normal    (+) hypertension, CAD, cardiac stents (Still taking ASA, plavix) PVD,  carotid artery disease (s/p CEA)      Neuro/Psych- negative ROS  GI/Hepatic/Renal/Endo    (+)  GERD,      Musculoskeletal     Abdominal    Substance History      OB/GYN          Other   arthritis,                      Anesthesia Plan    ASA 3     general     intravenous induction     Anesthetic plan, all risks, benefits, and alternatives have been provided, discussed and informed consent has been obtained with: patient.    Plan discussed with CRNA.        CODE STATUS:

## 2022-04-30 LAB
ANION GAP SERPL CALCULATED.3IONS-SCNC: 10 MMOL/L (ref 5–15)
ANION GAP SERPL CALCULATED.3IONS-SCNC: 12 MMOL/L (ref 5–15)
BASOPHILS # BLD AUTO: 0.02 10*3/MM3 (ref 0–0.2)
BASOPHILS NFR BLD AUTO: 0.2 % (ref 0–1.5)
BUN SERPL-MCNC: 10 MG/DL (ref 8–23)
BUN SERPL-MCNC: 9 MG/DL (ref 8–23)
BUN/CREAT SERPL: 11.6 (ref 7–25)
BUN/CREAT SERPL: 12.9 (ref 7–25)
CALCIUM SPEC-SCNC: 7.9 MG/DL (ref 8.6–10.5)
CALCIUM SPEC-SCNC: 8.6 MG/DL (ref 8.6–10.5)
CHLORIDE SERPL-SCNC: 93 MMOL/L (ref 98–107)
CHLORIDE SERPL-SCNC: 96 MMOL/L (ref 98–107)
CO2 SERPL-SCNC: 22 MMOL/L (ref 22–29)
CO2 SERPL-SCNC: 23 MMOL/L (ref 22–29)
CREAT SERPL-MCNC: 0.7 MG/DL (ref 0.57–1)
CREAT SERPL-MCNC: 0.86 MG/DL (ref 0.57–1)
DEPRECATED RDW RBC AUTO: 41.1 FL (ref 37–54)
DEPRECATED RDW RBC AUTO: 41.1 FL (ref 37–54)
EGFRCR SERPLBLD CKD-EPI 2021: 77.5 ML/MIN/1.73
EGFRCR SERPLBLD CKD-EPI 2021: 99.2 ML/MIN/1.73
EOSINOPHIL # BLD AUTO: 0 10*3/MM3 (ref 0–0.4)
EOSINOPHIL NFR BLD AUTO: 0 % (ref 0.3–6.2)
ERYTHROCYTE [DISTWIDTH] IN BLOOD BY AUTOMATED COUNT: 12.2 % (ref 12.3–15.4)
ERYTHROCYTE [DISTWIDTH] IN BLOOD BY AUTOMATED COUNT: 12.3 % (ref 12.3–15.4)
GLUCOSE SERPL-MCNC: 196 MG/DL (ref 65–99)
GLUCOSE SERPL-MCNC: 199 MG/DL (ref 65–99)
HCT VFR BLD AUTO: 27.6 % (ref 34–46.6)
HCT VFR BLD AUTO: 29.1 % (ref 34–46.6)
HGB BLD-MCNC: 9.3 G/DL (ref 12–15.9)
HGB BLD-MCNC: 9.9 G/DL (ref 12–15.9)
IMM GRANULOCYTES # BLD AUTO: 0.06 10*3/MM3 (ref 0–0.05)
IMM GRANULOCYTES NFR BLD AUTO: 0.5 % (ref 0–0.5)
LYMPHOCYTES # BLD AUTO: 1.13 10*3/MM3 (ref 0.7–3.1)
LYMPHOCYTES NFR BLD AUTO: 8.6 % (ref 19.6–45.3)
MCH RBC QN AUTO: 30.9 PG (ref 26.6–33)
MCH RBC QN AUTO: 31.2 PG (ref 26.6–33)
MCHC RBC AUTO-ENTMCNC: 33.7 G/DL (ref 31.5–35.7)
MCHC RBC AUTO-ENTMCNC: 34 G/DL (ref 31.5–35.7)
MCV RBC AUTO: 91.7 FL (ref 79–97)
MCV RBC AUTO: 91.8 FL (ref 79–97)
MONOCYTES # BLD AUTO: 1.13 10*3/MM3 (ref 0.1–0.9)
MONOCYTES NFR BLD AUTO: 8.6 % (ref 5–12)
NEUTROPHILS NFR BLD AUTO: 10.84 10*3/MM3 (ref 1.7–7)
NEUTROPHILS NFR BLD AUTO: 82.1 % (ref 42.7–76)
NRBC BLD AUTO-RTO: 0 /100 WBC (ref 0–0.2)
PLATELET # BLD AUTO: 232 10*3/MM3 (ref 140–450)
PLATELET # BLD AUTO: 243 10*3/MM3 (ref 140–450)
PMV BLD AUTO: 10.6 FL (ref 6–12)
PMV BLD AUTO: 10.8 FL (ref 6–12)
POTASSIUM SERPL-SCNC: 4 MMOL/L (ref 3.5–5.2)
POTASSIUM SERPL-SCNC: 4.4 MMOL/L (ref 3.5–5.2)
RBC # BLD AUTO: 3.01 10*6/MM3 (ref 3.77–5.28)
RBC # BLD AUTO: 3.17 10*6/MM3 (ref 3.77–5.28)
SODIUM SERPL-SCNC: 126 MMOL/L (ref 136–145)
SODIUM SERPL-SCNC: 130 MMOL/L (ref 136–145)
SODIUM SERPL-SCNC: 130 MMOL/L (ref 136–145)
WBC NRBC COR # BLD: 13.18 10*3/MM3 (ref 3.4–10.8)
WBC NRBC COR # BLD: 15.17 10*3/MM3 (ref 3.4–10.8)

## 2022-04-30 PROCEDURE — 63710000001 ONDANSETRON PER 8 MG: Performed by: PHYSICIAN ASSISTANT

## 2022-04-30 PROCEDURE — 99232 SBSQ HOSP IP/OBS MODERATE 35: CPT | Performed by: INTERNAL MEDICINE

## 2022-04-30 PROCEDURE — 84295 ASSAY OF SERUM SODIUM: CPT | Performed by: NURSE PRACTITIONER

## 2022-04-30 PROCEDURE — 80048 BASIC METABOLIC PNL TOTAL CA: CPT | Performed by: THORACIC SURGERY (CARDIOTHORACIC VASCULAR SURGERY)

## 2022-04-30 PROCEDURE — 99024 POSTOP FOLLOW-UP VISIT: CPT | Performed by: THORACIC SURGERY (CARDIOTHORACIC VASCULAR SURGERY)

## 2022-04-30 PROCEDURE — 80048 BASIC METABOLIC PNL TOTAL CA: CPT | Performed by: PHYSICIAN ASSISTANT

## 2022-04-30 PROCEDURE — 85025 COMPLETE CBC W/AUTO DIFF WBC: CPT | Performed by: PHYSICIAN ASSISTANT

## 2022-04-30 PROCEDURE — 85027 COMPLETE CBC AUTOMATED: CPT | Performed by: THORACIC SURGERY (CARDIOTHORACIC VASCULAR SURGERY)

## 2022-04-30 PROCEDURE — 25010000002 CEFAZOLIN IN DEXTROSE 2-4 GM/100ML-% SOLUTION: Performed by: PHYSICIAN ASSISTANT

## 2022-04-30 RX ORDER — ACETAMINOPHEN 325 MG/1
650 TABLET ORAL EVERY 6 HOURS PRN
Status: DISCONTINUED | OUTPATIENT
Start: 2022-04-30 | End: 2022-05-01 | Stop reason: HOSPADM

## 2022-04-30 RX ADMIN — CLOPIDOGREL BISULFATE 75 MG: 75 TABLET ORAL at 08:29

## 2022-04-30 RX ADMIN — ONDANSETRON HYDROCHLORIDE 4 MG: 4 TABLET, FILM COATED ORAL at 07:44

## 2022-04-30 RX ADMIN — ASPIRIN 81 MG: 81 TABLET, COATED ORAL at 08:29

## 2022-04-30 RX ADMIN — Medication 1 PATCH: at 08:29

## 2022-04-30 RX ADMIN — ROSUVASTATIN 20 MG: 20 TABLET, FILM COATED ORAL at 08:29

## 2022-04-30 RX ADMIN — CEFAZOLIN SODIUM 2 G: 2 INJECTION, SOLUTION INTRAVENOUS at 03:59

## 2022-04-30 RX ADMIN — PANTOPRAZOLE SODIUM 40 MG: 40 TABLET, DELAYED RELEASE ORAL at 07:44

## 2022-04-30 RX ADMIN — ACETAMINOPHEN 650 MG: 325 TABLET ORAL at 21:27

## 2022-05-01 VITALS
HEART RATE: 92 BPM | HEIGHT: 63 IN | SYSTOLIC BLOOD PRESSURE: 137 MMHG | WEIGHT: 104.28 LBS | DIASTOLIC BLOOD PRESSURE: 89 MMHG | OXYGEN SATURATION: 96 % | RESPIRATION RATE: 16 BRPM | TEMPERATURE: 98.4 F | BODY MASS INDEX: 18.48 KG/M2

## 2022-05-01 LAB
ANION GAP SERPL CALCULATED.3IONS-SCNC: 10 MMOL/L (ref 5–15)
BASOPHILS # BLD AUTO: 0.04 10*3/MM3 (ref 0–0.2)
BASOPHILS NFR BLD AUTO: 0.3 % (ref 0–1.5)
BUN SERPL-MCNC: 10 MG/DL (ref 8–23)
BUN/CREAT SERPL: 13.5 (ref 7–25)
CALCIUM SPEC-SCNC: 8.9 MG/DL (ref 8.6–10.5)
CHLORIDE SERPL-SCNC: 98 MMOL/L (ref 98–107)
CO2 SERPL-SCNC: 24 MMOL/L (ref 22–29)
CREAT SERPL-MCNC: 0.74 MG/DL (ref 0.57–1)
DEPRECATED RDW RBC AUTO: 40.2 FL (ref 37–54)
EGFRCR SERPLBLD CKD-EPI 2021: 92.8 ML/MIN/1.73
EOSINOPHIL # BLD AUTO: 0.05 10*3/MM3 (ref 0–0.4)
EOSINOPHIL NFR BLD AUTO: 0.4 % (ref 0.3–6.2)
ERYTHROCYTE [DISTWIDTH] IN BLOOD BY AUTOMATED COUNT: 12.2 % (ref 12.3–15.4)
GLUCOSE SERPL-MCNC: 111 MG/DL (ref 65–99)
HCT VFR BLD AUTO: 25.8 % (ref 34–46.6)
HGB BLD-MCNC: 9 G/DL (ref 12–15.9)
IMM GRANULOCYTES # BLD AUTO: 0.05 10*3/MM3 (ref 0–0.05)
IMM GRANULOCYTES NFR BLD AUTO: 0.4 % (ref 0–0.5)
LYMPHOCYTES # BLD AUTO: 2.43 10*3/MM3 (ref 0.7–3.1)
LYMPHOCYTES NFR BLD AUTO: 19.5 % (ref 19.6–45.3)
MCH RBC QN AUTO: 31.6 PG (ref 26.6–33)
MCHC RBC AUTO-ENTMCNC: 34.9 G/DL (ref 31.5–35.7)
MCV RBC AUTO: 90.5 FL (ref 79–97)
MONOCYTES # BLD AUTO: 1.16 10*3/MM3 (ref 0.1–0.9)
MONOCYTES NFR BLD AUTO: 9.3 % (ref 5–12)
NEUTROPHILS NFR BLD AUTO: 70.1 % (ref 42.7–76)
NEUTROPHILS NFR BLD AUTO: 8.71 10*3/MM3 (ref 1.7–7)
NRBC BLD AUTO-RTO: 0 /100 WBC (ref 0–0.2)
PLATELET # BLD AUTO: 230 10*3/MM3 (ref 140–450)
PMV BLD AUTO: 10.5 FL (ref 6–12)
POTASSIUM SERPL-SCNC: 3.5 MMOL/L (ref 3.5–5.2)
RBC # BLD AUTO: 2.85 10*6/MM3 (ref 3.77–5.28)
SODIUM SERPL-SCNC: 132 MMOL/L (ref 136–145)
WBC NRBC COR # BLD: 12.44 10*3/MM3 (ref 3.4–10.8)

## 2022-05-01 PROCEDURE — 85025 COMPLETE CBC W/AUTO DIFF WBC: CPT | Performed by: INTERNAL MEDICINE

## 2022-05-01 PROCEDURE — 99024 POSTOP FOLLOW-UP VISIT: CPT | Performed by: THORACIC SURGERY (CARDIOTHORACIC VASCULAR SURGERY)

## 2022-05-01 PROCEDURE — 80048 BASIC METABOLIC PNL TOTAL CA: CPT | Performed by: INTERNAL MEDICINE

## 2022-05-01 RX ORDER — POTASSIUM CHLORIDE 750 MG/1
40 CAPSULE, EXTENDED RELEASE ORAL AS NEEDED
Status: DISCONTINUED | OUTPATIENT
Start: 2022-05-01 | End: 2022-05-01 | Stop reason: HOSPADM

## 2022-05-01 RX ORDER — POTASSIUM CHLORIDE 1.5 G/1.77G
40 POWDER, FOR SOLUTION ORAL AS NEEDED
Status: DISCONTINUED | OUTPATIENT
Start: 2022-05-01 | End: 2022-05-01 | Stop reason: HOSPADM

## 2022-05-01 RX ADMIN — ASPIRIN 81 MG: 81 TABLET, COATED ORAL at 08:14

## 2022-05-01 RX ADMIN — AMLODIPINE BESYLATE 10 MG: 10 TABLET ORAL at 08:14

## 2022-05-01 RX ADMIN — POTASSIUM CHLORIDE 40 MEQ: 1.5 POWDER, FOR SOLUTION ORAL at 04:00

## 2022-05-01 RX ADMIN — PANTOPRAZOLE SODIUM 40 MG: 40 TABLET, DELAYED RELEASE ORAL at 08:14

## 2022-05-01 RX ADMIN — Medication 1 PATCH: at 08:14

## 2022-05-01 RX ADMIN — CLOPIDOGREL BISULFATE 75 MG: 75 TABLET ORAL at 08:14

## 2022-05-01 RX ADMIN — ROSUVASTATIN 20 MG: 20 TABLET, FILM COATED ORAL at 08:14

## 2022-05-01 NOTE — DISCHARGE INSTR - ACTIVITY
Activity as tolerated. No lifting over 10 pounds. Do not sit 90 degrees or cross legs. No driving until next appointment.

## 2022-05-01 NOTE — PLAN OF CARE
Goal Outcome Evaluation:  Plan of Care Reviewed With: patient        Progress: improving     VSS overnight. NSR. On RA. Remains neuro intact. Ambulated 220 ft with minimal assist. No gtts. No new skin issues. Numbness still reported by patient, unchanged from last night. Pedal pulses weak but palpable, verified by doppler. Dressing remains in place, cdi. Site remains free of hematoma and signs of bleeding. SHAZIA OP: 30 mL, still sanguinous. Voiding using BSC, UOP improved, 1.5 L so far this shift. No nausea reported this shift. Audible bowel sounds, no BM. Sodium improving from yesterday, 132 this AM. K+ 3.5, first dose of replacement given.

## 2022-05-01 NOTE — DISCHARGE INSTR - APPOINTMENTS
Follow up with Dr Quinn in 2 weeks.  Dr Quinn's office will call you to confirm appointment. If you do not hear from the office by the end of the week, call Dr Quinn's office.    Follow up with primary provider in 2 weeks

## 2022-05-01 NOTE — PROGRESS NOTES
CTS Progress Note       LOS: 2 days   Patient Care Team:  Zachary Shepherd III, MD as PCP - General (Family Medicine)    Chief Complaint: PAD (peripheral artery disease) (HCC)    Vital Signs:  Temp:  [97.1 °F (36.2 °C)-97.9 °F (36.6 °C)] 97.8 °F (36.6 °C)  Heart Rate:  [73-99] 74  Resp:  [14-20] 16  BP: ()/(52-93) 94/57    Physical Exam:  Right foot warm and viable and groin site is satisfactory   Results:   Results from last 7 days   Lab Units 05/01/22  0243   WBC 10*3/mm3 12.44*   HEMOGLOBIN g/dL 9.0*   HEMATOCRIT % 25.8*   PLATELETS 10*3/mm3 230     Results from last 7 days   Lab Units 05/01/22  0243   SODIUM mmol/L 132*   POTASSIUM mmol/L 3.5   CHLORIDE mmol/L 98   CO2 mmol/L 24.0   BUN mg/dL 10   CREATININE mg/dL 0.74   GLUCOSE mg/dL 111*   CALCIUM mg/dL 8.9           Imaging Results (Last 24 Hours)     ** No results found for the last 24 hours. **          Assessment      Extensive PAD (peripheral artery disease) (HCC)    Essential hypertension    CAD without angina pectoris with previous stents    Dyslipidemia    Alcohol use    Centrilobular emphysema and active cigarette abuse (HCC)    80% superior mesenteric artery stenosis (HCC)    Right external iliac, right common femoral, proximal superficial femoral, and profundus femoral artery occlusion (HCC)    S/P endarterectomy of right external iliac, right common femoral, proximal superficial femoral, and profundus femoral artery 4/29/2022    Patient states she is now able to walk without pain.  Plan for discharge home today    Plan   Discontinue Fawad-Scott drain  Discharge home today  Patient to continue her home Plavix and take last dose Tuesday  Patient needs prescription for Xarelto 2.5 mg p.o. twice daily.  She is to take her first dose Wednesday morning  Patient to remove groin dressing on Tuesday, May 3  Follow-up my office 2 weeks for wound check    Please note that portions of this note were completed with a voice recognition program.  Efforts were made to edit the dictations, but occasionally words are mistranscribed.    Dallin Quinn MD  05/01/22  07:52 EDT

## 2022-05-01 NOTE — PROGRESS NOTES
Patient is on Rivaroxaban.  Education provided on 5/1/22 verbally and in writing.  Information provided includes effects of medication, drug-drug and drug-food interactions, and signs/symptoms of bleeding and clotting.  Patient verbalized understanding through teach back.  All pertinent questions were answered.     Mel Roberts, PharmD, BCPS  5/1/2022  09:46 EDT

## 2022-05-02 ENCOUNTER — READMISSION MANAGEMENT (OUTPATIENT)
Dept: CALL CENTER | Facility: HOSPITAL | Age: 62
End: 2022-05-02

## 2022-05-02 LAB
BH BB BLOOD EXPIRATION DATE: NORMAL
BH BB BLOOD EXPIRATION DATE: NORMAL
BH BB BLOOD TYPE BARCODE: 5100
BH BB BLOOD TYPE BARCODE: 5100
BH BB DISPENSE STATUS: NORMAL
BH BB DISPENSE STATUS: NORMAL
BH BB PRODUCT CODE: NORMAL
BH BB PRODUCT CODE: NORMAL
BH BB UNIT NUMBER: NORMAL
BH BB UNIT NUMBER: NORMAL
CROSSMATCH INTERPRETATION: NORMAL
CROSSMATCH INTERPRETATION: NORMAL
CYTO UR: NORMAL
LAB AP CASE REPORT: NORMAL
LAB AP CLINICAL INFORMATION: NORMAL
PATH REPORT.FINAL DX SPEC: NORMAL
PATH REPORT.GROSS SPEC: NORMAL
UNIT  ABO: NORMAL
UNIT  ABO: NORMAL
UNIT  RH: NORMAL
UNIT  RH: NORMAL

## 2022-05-02 NOTE — OUTREACH NOTE
Prep Survey    Flowsheet Row Responses   [facility] facility patient discharged from? [facility]   Is LACE score < 7 ? No   Emergency Room discharge w/ pulse ox? No   Eligibility Not Eligible   What are the reasons patient is not eligible? Other  [ETOH abuse]   Does the patient have one of the following disease processes/diagnoses(primary or secondary)? Other   Prep survey completed? Yes          LAN WILLIAMSON - Registered Nurse

## 2022-05-05 ENCOUNTER — TELEPHONE (OUTPATIENT)
Dept: CARDIAC SURGERY | Facility: CLINIC | Age: 62
End: 2022-05-05

## 2022-05-05 NOTE — TELEPHONE ENCOUNTER
"Has a \"blood blister\" toward the top of her right groin incision, but says it was there when she was in the hospital and has not changed.  She e-mailed a picture and Kelle LARSON has reviewed.  Ms. Chan does report very top of incision is firm, but does not extend into abdominal area, no drainage, redness, or warm to the touch.  Advised her to keep a close eye on it and let us know of any changes.   "

## 2022-05-08 NOTE — DISCHARGE SUMMARY
CTS Discharge Summary    Patient Care Team:  Zachary Shepherd III, MD as PCP - General (Family Medicine)      Date of Admission: 4/29/2022  8:04 AM  Date of Discharge: 5/1/2022    Discharge Diagnosis  Past Medical History:   Diagnosis Date   • Anxiety    • Arthritis    • Coronary artery disease     s/p stent in 2016   • Depression    • Dyslipidemia    • GERD (gastroesophageal reflux disease)    • Hyperlipidemia    • Hypertension    • Myocardial infarction (HCC)     age 28   • Peripheral vascular disease (HCC)    • Wears dentures     UPPER PLATE    • Wears glasses          Extensive PAD (peripheral artery disease) ([facility])    Essential hypertension    CAD without angina pectoris with previous stents    Dyslipidemia    Alcohol use    Centrilobular emphysema and active cigarette abuse ([facility])    80% superior mesenteric artery stenosis ([facility])    Right external iliac, right common femoral, proximal superficial femoral, and profundus femoral artery occlusion ([facility])    S/P endarterectomy of right external iliac, right common femoral, proximal superficial femoral, and profundus femoral artery 4/29/2022      History of Present IllnessHistory of Present Illness:    Shannon Chan is a 61 y.o. female with a history of hypertension, hyperlipidemia, ongoing tobacco use, CAD s/p stenting, bilateral carotid stenosis (50% on right preoperatively) s/p left carotid endarterectomy on 4/2/2019 with Dr. Quinn and PVD s/p AA with runoff on 3/8/2022 with Dr. Quinn.  Patient presents today for post procedure follow-up and to discuss revascularization options.  Since procedure, patient continues to have bilateral lower extremity claudication symptoms, right greater than left.  She denies any LE ulcerations and denies any rest pain.  She is continuing to smoke.  She is on DAPT.  Patient was last seen in 2019 postoperative follow-up with Dr. Quinn in regards to her bilateral carotid stenosis s/p left CEA with plans for 1 year carotid  duplex with Dr. Macario's office with follow-up in our office and unfortunately was lost to follow-up.  Patient is now following with .  She denies any carotid duplex since 2019 and in review of his recent office note with referral for her PVD to Dr. Quinn, does not appear that she has had a carotid duplex since 2019.  She denies any TIA/CVA symptoms.  Unfortunately, patient continues to smoke.  AORTAGRAM WITH  RUNOFFS POSSIBLE RIGHT ILIAC STENT; FEMORAL ENDARTERECTOMY  Agree the above.  Patient is aware the procedure risk of bleeding infection contrast reaction death and limb loss.  She has extremely small hypoplastic arteries and has been smoking since 13 years of age.  She is agreeable to proceed         Hospital Course  Patient is a 61 y.o. female underwent right femoral artery cutdown with extended endarterectomy of the distal right external iliac and right common femoral and proximal superficial femoral and profundus femoral artery.  Pericardial patch closure for the right external carotid artery including the right common femoral into the superficial common artery  She was started on Plavix able to ambulate well and she was discharged home 5/1/2022 on Xarelto which she is to start on Tuesday discontinue the Plavix on Tuesday    Procedures Performed  Procedure(s):  ARTERIOGRAM OF RIGHT FERMORAL ARTERY WITH RUNOFFS AND PROFUNDAPLASTY  FEMORAL ENDARTERECTOMY       Consults:   Consults     No orders found from 3/31/2022 to 4/30/2022.            Discharge Medications     Discharge Medications      New Medications      Instructions Start Date   Xarelto 2.5 MG tablet  Generic drug: Rivaroxaban   2.5 mg, Oral, 2 Times Daily With Meals         Continue These Medications      Instructions Start Date   amLODIPine 10 MG tablet  Commonly known as: NORVASC   10 mg, Oral, Every Morning      aspirin 81 MG EC tablet   81 mg, Oral, Daily      clopidogrel 75 MG tablet  Commonly known as: PLAVIX   75 mg, Oral, Every  Morning, Per office do not stop      pantoprazole 40 MG EC tablet  Commonly known as: PROTONIX   40 mg, Oral, Every Morning      rosuvastatin 20 MG tablet  Commonly known as: CRESTOR   20 mg, Oral, Every Morning             Discharge Diet:   Diet Instructions    Cardiac (low fat, low sodium)           Activity at Discharge:   Activity Instructions    Activity as tolerated. No lifting over 10 pounds. Do not sit 90 degrees or cross legs. No driving until next appointment.         Do not drive while taking narcotics    Follow-up Appointments  Future Appointments   Date Time Provider Department Center   5/26/2022  3:00 PM Kelle Hogan APRN [facility]   4/6/2023 12:30 PM Kelle Hogan APRN [facility]          Nelson Johnson PA-C  05/08/22  09:40 EDT

## 2022-05-16 ENCOUNTER — TELEPHONE (OUTPATIENT)
Dept: CARDIAC SURGERY | Facility: CLINIC | Age: 62
End: 2022-05-16

## 2022-05-16 NOTE — TELEPHONE ENCOUNTER
Patient contacted our office stating she is having hypotension due to her blood thinner ( Xarelto ).     Patient was advised that she needs to see her PCP that this is not a common side effect from this medication. Patient stated she has already seen there office on 5/12 and they did not say anything to her about it. I informed her that she will need to call there office and tell them about this so she can be evaluated.     She voiced understanding. Appt info for our office was given.

## 2022-05-19 ENCOUNTER — TELEPHONE (OUTPATIENT)
Dept: CARDIAC SURGERY | Facility: CLINIC | Age: 62
End: 2022-05-19

## 2022-05-19 NOTE — TELEPHONE ENCOUNTER
I received a phone call from the pt's wound care nurse today stating that the pt's would has dehiscence and wanted to see if the pt could be seen today. I offered the pt an apt with our APRN, Nadira, at either 12 or 12:30 today -pt stated that was not enough notice to get here. Pt asked for an apt on 05/23-pt is aware of this apt date and time.

## 2022-05-23 ENCOUNTER — HOSPITAL ENCOUNTER (OUTPATIENT)
Facility: HOSPITAL | Age: 62
Setting detail: OBSERVATION
End: 2022-05-23
Attending: INTERNAL MEDICINE | Admitting: INTERNAL MEDICINE

## 2022-05-23 ENCOUNTER — OFFICE VISIT (OUTPATIENT)
Dept: CARDIAC SURGERY | Facility: CLINIC | Age: 62
End: 2022-05-23

## 2022-05-23 ENCOUNTER — HOSPITAL ENCOUNTER (INPATIENT)
Facility: HOSPITAL | Age: 62
LOS: 9 days | Discharge: HOME | End: 2022-06-01
Attending: INTERNAL MEDICINE | Admitting: INTERNAL MEDICINE

## 2022-05-23 VITALS
SYSTOLIC BLOOD PRESSURE: 90 MMHG | WEIGHT: 98.8 LBS | HEART RATE: 100 BPM | DIASTOLIC BLOOD PRESSURE: 56 MMHG | HEIGHT: 63 IN | TEMPERATURE: 97.3 F | BODY MASS INDEX: 17.5 KG/M2 | OXYGEN SATURATION: 98 %

## 2022-05-23 DIAGNOSIS — Z98.890 H/O ENDARTERECTOMY: ICD-10-CM

## 2022-05-23 DIAGNOSIS — T81.30XA WOUND DEHISCENCE: ICD-10-CM

## 2022-05-23 DIAGNOSIS — K55.1 SUPERIOR MESENTERIC ARTERY STENOSIS: ICD-10-CM

## 2022-05-23 DIAGNOSIS — I70.201 FEMORAL ARTERY OCCLUSION, RIGHT: ICD-10-CM

## 2022-05-23 DIAGNOSIS — F41.9 ANXIETY DISORDER, UNSPECIFIED TYPE: ICD-10-CM

## 2022-05-23 DIAGNOSIS — J43.2 CENTRILOBULAR EMPHYSEMA: Primary | ICD-10-CM

## 2022-05-23 DIAGNOSIS — T81.31XA DEHISCENCE OF OPERATIVE WOUND, INITIAL ENCOUNTER: Primary | ICD-10-CM

## 2022-05-23 LAB
ALBUMIN SERPL-MCNC: 3.3 G/DL (ref 3.5–5.2)
ALBUMIN/GLOB SERPL: 0.9 G/DL
ALP SERPL-CCNC: 78 U/L (ref 39–117)
ALT SERPL W P-5'-P-CCNC: 10 U/L (ref 1–33)
ANION GAP SERPL CALCULATED.3IONS-SCNC: 13 MMOL/L (ref 5–15)
AST SERPL-CCNC: 20 U/L (ref 1–32)
BASOPHILS # BLD AUTO: 0.05 10*3/MM3 (ref 0–0.2)
BASOPHILS NFR BLD AUTO: 0.6 % (ref 0–1.5)
BILIRUB SERPL-MCNC: <0.2 MG/DL (ref 0–1.2)
BUN SERPL-MCNC: 9 MG/DL (ref 8–23)
BUN/CREAT SERPL: 9.5 (ref 7–25)
CALCIUM SPEC-SCNC: 9.1 MG/DL (ref 8.6–10.5)
CHLORIDE SERPL-SCNC: 98 MMOL/L (ref 98–107)
CO2 SERPL-SCNC: 20 MMOL/L (ref 22–29)
CREAT SERPL-MCNC: 0.95 MG/DL (ref 0.57–1)
D-LACTATE SERPL-SCNC: 1.9 MMOL/L (ref 0.5–2)
DEPRECATED RDW RBC AUTO: 42 FL (ref 37–54)
EGFRCR SERPLBLD CKD-EPI 2021: 68.7 ML/MIN/1.73
EOSINOPHIL # BLD AUTO: 0.03 10*3/MM3 (ref 0–0.4)
EOSINOPHIL NFR BLD AUTO: 0.3 % (ref 0.3–6.2)
ERYTHROCYTE [DISTWIDTH] IN BLOOD BY AUTOMATED COUNT: 13.2 % (ref 12.3–15.4)
GLOBULIN UR ELPH-MCNC: 3.5 GM/DL
GLUCOSE SERPL-MCNC: 111 MG/DL (ref 65–99)
HCT VFR BLD AUTO: 26.8 % (ref 34–46.6)
HGB BLD-MCNC: 9.1 G/DL (ref 12–15.9)
IMM GRANULOCYTES # BLD AUTO: 0.08 10*3/MM3 (ref 0–0.05)
IMM GRANULOCYTES NFR BLD AUTO: 0.9 % (ref 0–0.5)
LYMPHOCYTES # BLD AUTO: 2.39 10*3/MM3 (ref 0.7–3.1)
LYMPHOCYTES NFR BLD AUTO: 26.8 % (ref 19.6–45.3)
MCH RBC QN AUTO: 29.8 PG (ref 26.6–33)
MCHC RBC AUTO-ENTMCNC: 34 G/DL (ref 31.5–35.7)
MCV RBC AUTO: 87.9 FL (ref 79–97)
MONOCYTES # BLD AUTO: 0.54 10*3/MM3 (ref 0.1–0.9)
MONOCYTES NFR BLD AUTO: 6.1 % (ref 5–12)
NEUTROPHILS NFR BLD AUTO: 5.82 10*3/MM3 (ref 1.7–7)
NEUTROPHILS NFR BLD AUTO: 65.3 % (ref 42.7–76)
NRBC BLD AUTO-RTO: 0 /100 WBC (ref 0–0.2)
PLATELET # BLD AUTO: 458 10*3/MM3 (ref 140–450)
PMV BLD AUTO: 9.9 FL (ref 6–12)
POTASSIUM SERPL-SCNC: 3.6 MMOL/L (ref 3.5–5.2)
PROT SERPL-MCNC: 6.8 G/DL (ref 6–8.5)
RBC # BLD AUTO: 3.05 10*6/MM3 (ref 3.77–5.28)
SARS-COV-2 RNA PNL SPEC NAA+PROBE: NOT DETECTED
SODIUM SERPL-SCNC: 131 MMOL/L (ref 136–145)
WBC NRBC COR # BLD: 8.91 10*3/MM3 (ref 3.4–10.8)

## 2022-05-23 PROCEDURE — 85025 COMPLETE CBC W/AUTO DIFF WBC: CPT | Performed by: INTERNAL MEDICINE

## 2022-05-23 PROCEDURE — 87077 CULTURE AEROBIC IDENTIFY: CPT | Performed by: INTERNAL MEDICINE

## 2022-05-23 PROCEDURE — 99024 POSTOP FOLLOW-UP VISIT: CPT | Performed by: THORACIC SURGERY (CARDIOTHORACIC VASCULAR SURGERY)

## 2022-05-23 PROCEDURE — 83605 ASSAY OF LACTIC ACID: CPT | Performed by: INTERNAL MEDICINE

## 2022-05-23 PROCEDURE — 87205 SMEAR GRAM STAIN: CPT | Performed by: INTERNAL MEDICINE

## 2022-05-23 PROCEDURE — 87070 CULTURE OTHR SPECIMN AEROBIC: CPT | Performed by: INTERNAL MEDICINE

## 2022-05-23 PROCEDURE — 99024 POSTOP FOLLOW-UP VISIT: CPT | Performed by: NURSE PRACTITIONER

## 2022-05-23 PROCEDURE — 25010000002 PIPERACILLIN SOD-TAZOBACTAM PER 1 G

## 2022-05-23 PROCEDURE — U0004 COV-19 TEST NON-CDC HGH THRU: HCPCS | Performed by: INTERNAL MEDICINE

## 2022-05-23 PROCEDURE — 25010000002 VANCOMYCIN PER 500 MG

## 2022-05-23 PROCEDURE — 82550 ASSAY OF CK (CPK): CPT | Performed by: INTERNAL MEDICINE

## 2022-05-23 PROCEDURE — 80053 COMPREHEN METABOLIC PANEL: CPT | Performed by: INTERNAL MEDICINE

## 2022-05-23 PROCEDURE — 99223 1ST HOSP IP/OBS HIGH 75: CPT | Performed by: INTERNAL MEDICINE

## 2022-05-23 PROCEDURE — 87040 BLOOD CULTURE FOR BACTERIA: CPT | Performed by: INTERNAL MEDICINE

## 2022-05-23 PROCEDURE — 87186 SC STD MICRODIL/AGAR DIL: CPT | Performed by: INTERNAL MEDICINE

## 2022-05-23 RX ORDER — ROSUVASTATIN CALCIUM 20 MG/1
20 TABLET, COATED ORAL EVERY MORNING
Status: DISCONTINUED | OUTPATIENT
Start: 2022-05-24 | End: 2022-06-01 | Stop reason: HOSPADM

## 2022-05-23 RX ORDER — PANTOPRAZOLE SODIUM 40 MG/1
40 TABLET, DELAYED RELEASE ORAL EVERY MORNING
Status: DISCONTINUED | OUTPATIENT
Start: 2022-05-24 | End: 2022-06-01 | Stop reason: HOSPADM

## 2022-05-23 RX ORDER — NICOTINE 21 MG/24HR
1 PATCH, TRANSDERMAL 24 HOURS TRANSDERMAL EVERY 24 HOURS
Status: DISCONTINUED | OUTPATIENT
Start: 2022-05-23 | End: 2022-06-01 | Stop reason: HOSPADM

## 2022-05-23 RX ORDER — POLYETHYLENE GLYCOL 3350 17 G/17G
17 POWDER, FOR SOLUTION ORAL DAILY PRN
Status: DISCONTINUED | OUTPATIENT
Start: 2022-05-23 | End: 2022-06-01 | Stop reason: HOSPADM

## 2022-05-23 RX ORDER — ASPIRIN 81 MG/1
81 TABLET ORAL DAILY
Status: DISCONTINUED | OUTPATIENT
Start: 2022-05-24 | End: 2022-06-01 | Stop reason: HOSPADM

## 2022-05-23 RX ORDER — SODIUM CHLORIDE 0.9 % (FLUSH) 0.9 %
10 SYRINGE (ML) INJECTION AS NEEDED
Status: DISCONTINUED | OUTPATIENT
Start: 2022-05-23 | End: 2022-06-01 | Stop reason: HOSPADM

## 2022-05-23 RX ORDER — AMOXICILLIN 250 MG
2 CAPSULE ORAL 2 TIMES DAILY
Status: DISCONTINUED | OUTPATIENT
Start: 2022-05-23 | End: 2022-06-01 | Stop reason: HOSPADM

## 2022-05-23 RX ORDER — SULFAMETHOXAZOLE AND TRIMETHOPRIM 800; 160 MG/1; MG/1
2 TABLET ORAL 2 TIMES DAILY
COMMUNITY
Start: 2022-05-18 | End: 2022-06-01 | Stop reason: HOSPADM

## 2022-05-23 RX ORDER — BISACODYL 5 MG/1
5 TABLET, DELAYED RELEASE ORAL DAILY PRN
Status: DISCONTINUED | OUTPATIENT
Start: 2022-05-23 | End: 2022-06-01 | Stop reason: HOSPADM

## 2022-05-23 RX ORDER — AMLODIPINE BESYLATE 10 MG/1
10 TABLET ORAL EVERY MORNING
Status: DISCONTINUED | OUTPATIENT
Start: 2022-05-24 | End: 2022-06-01 | Stop reason: HOSPADM

## 2022-05-23 RX ORDER — SODIUM CHLORIDE 9 MG/ML
100 INJECTION, SOLUTION INTRAVENOUS CONTINUOUS
Status: DISCONTINUED | OUTPATIENT
Start: 2022-05-23 | End: 2022-05-27

## 2022-05-23 RX ORDER — VANCOMYCIN HYDROCHLORIDE 1 G/200ML
20 INJECTION, SOLUTION INTRAVENOUS ONCE
Status: COMPLETED | OUTPATIENT
Start: 2022-05-23 | End: 2022-05-23

## 2022-05-23 RX ORDER — CEFUROXIME AXETIL 500 MG/1
500 TABLET ORAL 2 TIMES DAILY
COMMUNITY
Start: 2022-05-18 | End: 2022-06-01 | Stop reason: HOSPADM

## 2022-05-23 RX ORDER — SODIUM CHLORIDE 0.9 % (FLUSH) 0.9 %
10 SYRINGE (ML) INJECTION EVERY 12 HOURS SCHEDULED
Status: DISCONTINUED | OUTPATIENT
Start: 2022-05-23 | End: 2022-06-01 | Stop reason: HOSPADM

## 2022-05-23 RX ORDER — HYDROCODONE BITARTRATE AND ACETAMINOPHEN 5; 325 MG/1; MG/1
1 TABLET ORAL EVERY 4 HOURS PRN
Status: DISCONTINUED | OUTPATIENT
Start: 2022-05-23 | End: 2022-05-25

## 2022-05-23 RX ORDER — BISACODYL 10 MG
10 SUPPOSITORY, RECTAL RECTAL DAILY PRN
Status: DISCONTINUED | OUTPATIENT
Start: 2022-05-23 | End: 2022-06-01 | Stop reason: HOSPADM

## 2022-05-23 RX ORDER — SODIUM HYPOCHLORITE 1.25 MG/ML
SOLUTION TOPICAL 2 TIMES DAILY
Status: DISCONTINUED | OUTPATIENT
Start: 2022-05-23 | End: 2022-06-01 | Stop reason: HOSPADM

## 2022-05-23 RX ORDER — ONDANSETRON 2 MG/ML
4 INJECTION INTRAMUSCULAR; INTRAVENOUS EVERY 6 HOURS PRN
Status: DISCONTINUED | OUTPATIENT
Start: 2022-05-23 | End: 2022-06-01 | Stop reason: HOSPADM

## 2022-05-23 RX ADMIN — Medication 1 PATCH: at 15:29

## 2022-05-23 RX ADMIN — RIVAROXABAN 2.5 MG: 2.5 TABLET, FILM COATED ORAL at 17:43

## 2022-05-23 RX ADMIN — VANCOMYCIN HYDROCHLORIDE 1000 MG: 1 INJECTION, SOLUTION INTRAVENOUS at 16:38

## 2022-05-23 RX ADMIN — HYDROCODONE BITARTRATE AND ACETAMINOPHEN 1 TABLET: 5; 325 TABLET ORAL at 17:45

## 2022-05-23 RX ADMIN — Medication 10 ML: at 20:04

## 2022-05-23 RX ADMIN — HYDROCODONE BITARTRATE AND ACETAMINOPHEN 1 TABLET: 5; 325 TABLET ORAL at 23:17

## 2022-05-23 RX ADMIN — TAZOBACTAM SODIUM AND PIPERACILLIN SODIUM 3.38 G: 375; 3 INJECTION, SOLUTION INTRAVENOUS at 23:16

## 2022-05-23 RX ADMIN — TAZOBACTAM SODIUM AND PIPERACILLIN SODIUM 3.38 G: 375; 3 INJECTION, SOLUTION INTRAVENOUS at 17:43

## 2022-05-23 RX ADMIN — SODIUM CHLORIDE 100 ML/HR: 9 INJECTION, SOLUTION INTRAVENOUS at 15:31

## 2022-05-23 RX ADMIN — DAKIN'S SOLUTION 0.125% (QUARTER STRENGTH): 0.12 SOLUTION at 23:59

## 2022-05-23 NOTE — PROGRESS NOTES
"     [facility] Cardiothoracic Surgery Office Follow Up Note     Date of Encounter: 05/23/2022     YOB: 1960  Name: Shannon Chan    PCP: Zachary Shepherd III, MD    Chief Complaint:    Chief Complaint   Patient presents with   • Post-op     Hospital follow-up s/p endarterectomy of the right external iliac, right common femoral, right SFA, and profundus femoral artery 4/29/22. Patient states she has no energy, right groin incision has dehisced with yellow drainage, and is seeing [facility] wound care.        History of Present Illness:      Shannon Chan is a 61 y.o. female with a history of hypertension, hyperlipidemia, ongoing tobacco use, EtOH use CAD s/p stenting, bilateral carotid stenosis (50% on right preoperatively) s/p left carotid endarterectomy on 4/2/2019 with Dr. Quinn and PVD s/p right femoral artery cutdown with extended endarterectomy of the distal right external iliac, right common femoral, proximal SFA and profundus femoral artery with pericardial patch closure for the right external iliac artery into the right common femoral onto the SFA 4/29/2022 with Dr. Quinn.  Patient presents today in postoperative follow-up as well as right groin wound check.  During hospitalization, patient was noted to have DP/PT pulses postoperatively and was initiated on Xarelto therapy along with her aspirin and Plavix.  She does report that she has only been on her aspirin and Xarelto therapy.  Patient contact our office on 5/9/2022 with a \"blood blister\" to her right groin which at evaluated picture and appeared to be stable with no dehiscence, drainage or surrounding erythema.  I did asked patient to contact our office with any worsening symptoms.  She reports that the middle of last week, her right groin opened and had become more painful.  She was seen in the [facility] ER on 5/18.  Per [place] wound care note, she was treated with IV " vancomycin/Zosyn and discharged home on Bactrim and Ceftin therapy.  She then followed up with wound care on 5/19/2022 in which our office was contacted for evaluation of the patient.  She was offered same-day appointment, but patient did not have transportation to [place] and scheduled appointment for today.  Since Thursday, wound has progressively become more painful with purulent malodorous drainage.  She denies any fevers or chills, but does report malaise as well as a 6 pound weight loss due to poor appetite since discharge.  Blood pressures have been running systolically in the 90s which she states is low for her.  She is continued on her Bactrim and Ceftin therapy.  She is on ASA/Xarelto therapy.  Unfortunately, patient continues to smoke.    Review of Systems:  Review of Systems   Constitutional: Positive for malaise/fatigue and weight loss (6 lbs ). Negative for chills, decreased appetite and fever.   Cardiovascular: Negative for chest pain, claudication, dyspnea on exertion, irregular heartbeat, leg swelling, near-syncope, orthopnea, palpitations and syncope.   Respiratory: Negative for cough, hemoptysis, shortness of breath, sputum production and wheezing.    Hematologic/Lymphatic: Negative for bleeding problem. Does not bruise/bleed easily.   Skin: Negative for color change, poor wound healing and rash.   Musculoskeletal: Negative for back pain, falls and joint pain.   Gastrointestinal: Negative for abdominal pain, constipation, diarrhea, nausea and vomiting.   Neurological: Positive for weakness. Negative for focal weakness, numbness and paresthesias.   Psychiatric/Behavioral: Negative for depression. The patient does not have insomnia.        Allergies:  Allergies   Allergen Reactions   • Percocet [Oxycodone-Acetaminophen] Nausea And Vomiting     N/V   • Lortab [Hydrocodone-Acetaminophen] Nausea And Vomiting   • Darvon [Propoxyphene] Nausea And Vomiting     Darvocet       Medications:      Current  Outpatient Medications:   •  amLODIPine (NORVASC) 10 MG tablet, Take 10 mg by mouth Every Morning., Disp: , Rfl: 3  •  aspirin 81 MG EC tablet, Take 81 mg by mouth Daily., Disp: , Rfl: 3  •  cefuroxime (CEFTIN) 500 MG tablet, Take 500 mg by mouth 2 (Two) Times a Day., Disp: , Rfl:   •  pantoprazole (PROTONIX) 40 MG EC tablet, Take 40 mg by mouth Every Morning., Disp: , Rfl:   •  Rivaroxaban (XARELTO) 2.5 MG tablet, Take 1 tablet by mouth 2 (Two) Times a Day With Meals. Indications: Post Surgical - Other, Disp: 60 tablet, Rfl: 5  •  rosuvastatin (CRESTOR) 20 MG tablet, Take 20 mg by mouth Every Morning., Disp: , Rfl: 3  •  sulfamethoxazole-trimethoprim (BACTRIM DS,SEPTRA DS) 800-160 MG per tablet, Take 2 tablets by mouth 2 (Two) Times a Day., Disp: , Rfl:   •  clopidogrel (PLAVIX) 75 MG tablet, Take 75 mg by mouth Every Morning. Per office do not stop (Patient not taking: Reported on 5/23/2022), Disp: , Rfl: 3  No current facility-administered medications for this visit.    Social History     Socioeconomic History   • Marital status: Single   • Number of children: 0   Tobacco Use   • Smoking status: Current Every Day Smoker     Packs/day: 0.50     Years: 46.00     Pack years: 23.00     Types: Cigarettes   • Smokeless tobacco: Never Used   • Tobacco comment: started smoking at age 12   Vaping Use   • Vaping Use: Never used   Substance and Sexual Activity   • Alcohol use: Yes     Alcohol/week: 21.0 standard drinks     Types: 21 Cans of beer per week   • Drug use: No   • Sexual activity: Defer       Family History   Problem Relation Age of Onset   • Other Mother         brain tumor   • Alcohol abuse Father    • Cirrhosis Father        Past Medical History:   Diagnosis Date   • Anxiety    • Arthritis    • Coronary artery disease     s/p stent in 2016   • Depression    • Dyslipidemia    • GERD (gastroesophageal reflux disease)    • Hyperlipidemia    • Hypertension    • Myocardial infarction (HCC)     age 28   • Peripheral  "vascular disease (HCC)    • Wears dentures     UPPER PLATE    • Wears glasses        Past Surgical History:   Procedure Laterality Date   • AORTAGRAM N/A 3/8/2022    Procedure: AORTAGRAM WITH RUNOFFS;  Surgeon: Dallin Quinn MD;  [facility]:  [facility];  Service: Vascular;  Laterality: N/A;  FLURO-1 MIN 42 SEC    DOSE- 99.36mGy    VISI-60ML   • AORTAGRAM N/A 4/29/2022    Procedure: ARTERIOGRAM OF RIGHT FERMORAL ARTERY WITH RUNOFFS AND PROFUNDAPLASTY;  Surgeon: Dallin Quinn MD;  Location: [facility];  Service: Vascular;  Laterality: N/A;  FL:  Dose:  Contrast:     • CARDIAC CATHETERIZATION  2016    [place]   • CAROTID ENDARTERECTOMY Left 4/2/2019    Procedure: CAROTID ENDARTERECTOMY WITH EEG LEFT;  Surgeon: Dallin Quinn MD;  Location: [facility];  Service: Vascular   • CORONARY STENT PLACEMENT  2016    LADx2,Circx1 [place]   • FEMORAL ENDARTERECTOMY N/A 4/29/2022    Procedure: FEMORAL ENDARTERECTOMY;  Surgeon: Dallin Quinn MD;  Location: [facility];  Service: Vascular;  Laterality: N/A;  FL: 26 SEC  DOSE: 0.6 mGY      • KNEE SURGERY Left     fracture repair   • TUBAL ABDOMINAL LIGATION         I have reviewed the following portions of the patient's history: allergies, current medications, past family history, past medical history, past social history, past surgical history and problem list and confirm it's accurate.    Physical Exam:  Vital Signs:    Vitals:    05/23/22 0909   BP: 90/56   BP Location: Right arm   Patient Position: Sitting   Pulse: 100   Temp: 97.3 °F (36.3 °C)   SpO2: 98%   Weight: 44.8 kg (98 lb 12.8 oz)   Height: 160 cm (63\")     Body mass index is 17.5 kg/m².     Physical Exam  Vitals and nursing note reviewed.   Constitutional:       Appearance: She is underweight.   HENT:      Head: Normocephalic and atraumatic.   Cardiovascular:      Rate and Rhythm: Normal rate and regular rhythm.      Pulses:           Dorsalis pedis pulses are detected w/ Doppler " on the right side.        Posterior tibial pulses are detected w/ Doppler on the right side.      Comments: Some venous flow on Doppler pulses  Pulmonary:      Comments: Unlabored  Musculoskeletal:      Cervical back: Neck supple.      Right lower leg: No edema.   Skin:     General: Skin is warm and dry.      Comments: Incisions: Right groin with complete dehiscence of incision with small amount of purulent, malodorous drainage.  Surrounding erythema and induration with tenderness to palpation.  Lateral side of incision with quite a bit of fibrinous exudate.  Right lateral/superior incision with small area of necrosis     Neurological:      Mental Status: She is alert and oriented to person, place, and time.   Psychiatric:         Attention and Perception: Attention normal.         Mood and Affect: Mood normal.         Speech: Speech normal.         Behavior: Behavior is cooperative.             Labs/Imaging:        Chest X-Ray PA & Lateral    Result Date: 4/28/2022  No acute cardiopulmonary process.  This report was finalized on 4/28/2022 4:10 PM by Keyshawn Arrieta MD.         Results for orders placed during the hospital encounter of 04/12/22    Duplex Carotid Ultrasound CAR    Interpretation Summary  · Right internal carotid artery stenosis of 0-49%.  · Left internal carotid artery stenosis of 0-49%.      No results found for this or any previous visit.      Assessment / Plan:  Diagnoses and all orders for this visit:    1. Dehiscence of operative wound, initial encounter (Primary)    2. Right external iliac, right common femoral, proximal superficial femoral, and profundus femoral artery occlusion (HCC)     Sahnnon Chan is a 61 y.o. female with a history of hypertension, hyperlipidemia, ongoing tobacco use, EtOH use CAD s/p stenting, bilateral carotid stenosis (50% on right preoperatively) s/p left carotid endarterectomy on 4/2/2019 with Dr. Quinn and PVD s/p right femoral artery cutdown with extended  endarterectomy of the distal right external iliac, right common femoral, proximal SFA and profundus femoral artery with pericardial patch closure for the right external iliac artery into the right common femoral onto the SFA 4/29/2022 with Dr. Quinn.  Myself and Dr. Quinn examined patient with dehiscence of right groin incision with surrounding erythema.  She was seen last week in [place] ER on 5/18 with IV Vanco/Zosyn and has continued on Bactrim and Ceftin therapy.  Our office was contacted on 5/19/2022 with same-day appointment offered to patient, but she was unable to make it to [place] for evaluation due to transportation issues and scheduled appointment for today.  This has continued to worsen over the weekend with now malodorous purulent drainage and she has failed outpatient antibiotic therapy.  She does have dopplered right DP/PT pulses.  We will admit to hospital service for lab/IV antibiotics/wound care.  I have discussed case with Dr. Berumen.  We will initiate patient on 1/4 Dakin solution wet-to-dry dressing changes twice daily.  Consult wound care with plans for bedside wound debridement of fibrinous exudate/necrosis in a.m. with preprocedure pain medication.  Of note, she is on aspirin and Xarelto therapy and Plavix therapy has been held by patient.  No current plans for surgical intervention with Dr. Quinn.  Unfortunately, patient has continued to smoke and this has contributed to her poor wound healing.    Kelle Hogan, APRNATALIE  [facility] Cardiothoracic Surgery

## 2022-05-24 LAB
BASOPHILS # BLD AUTO: 0.07 10*3/MM3 (ref 0–0.2)
BASOPHILS NFR BLD AUTO: 0.8 % (ref 0–1.5)
CK SERPL-CCNC: 138 U/L (ref 20–180)
DEPRECATED RDW RBC AUTO: 42.5 FL (ref 37–54)
EOSINOPHIL # BLD AUTO: 0.1 10*3/MM3 (ref 0–0.4)
EOSINOPHIL NFR BLD AUTO: 1.2 % (ref 0.3–6.2)
ERYTHROCYTE [DISTWIDTH] IN BLOOD BY AUTOMATED COUNT: 13.2 % (ref 12.3–15.4)
HCT VFR BLD AUTO: 24.9 % (ref 34–46.6)
HGB BLD-MCNC: 8.7 G/DL (ref 12–15.9)
IMM GRANULOCYTES # BLD AUTO: 0.05 10*3/MM3 (ref 0–0.05)
IMM GRANULOCYTES NFR BLD AUTO: 0.6 % (ref 0–0.5)
LYMPHOCYTES # BLD AUTO: 2.17 10*3/MM3 (ref 0.7–3.1)
LYMPHOCYTES NFR BLD AUTO: 26.3 % (ref 19.6–45.3)
MCH RBC QN AUTO: 31 PG (ref 26.6–33)
MCHC RBC AUTO-ENTMCNC: 34.9 G/DL (ref 31.5–35.7)
MCV RBC AUTO: 88.6 FL (ref 79–97)
MONOCYTES # BLD AUTO: 0.56 10*3/MM3 (ref 0.1–0.9)
MONOCYTES NFR BLD AUTO: 6.8 % (ref 5–12)
NEUTROPHILS NFR BLD AUTO: 5.31 10*3/MM3 (ref 1.7–7)
NEUTROPHILS NFR BLD AUTO: 64.3 % (ref 42.7–76)
NRBC BLD AUTO-RTO: 0 /100 WBC (ref 0–0.2)
PLATELET # BLD AUTO: 396 10*3/MM3 (ref 140–450)
PMV BLD AUTO: 10.1 FL (ref 6–12)
RBC # BLD AUTO: 2.81 10*6/MM3 (ref 3.77–5.28)
WBC NRBC COR # BLD: 8.26 10*3/MM3 (ref 3.4–10.8)

## 2022-05-24 PROCEDURE — 99233 SBSQ HOSP IP/OBS HIGH 50: CPT | Performed by: HOSPITALIST

## 2022-05-24 PROCEDURE — 25010000002 DAPTOMYCIN PER 1 MG: Performed by: INTERNAL MEDICINE

## 2022-05-24 PROCEDURE — 25010000002 FUROSEMIDE PER 20 MG

## 2022-05-24 PROCEDURE — 97162 PT EVAL MOD COMPLEX 30 MIN: CPT

## 2022-05-24 PROCEDURE — 99024 POSTOP FOLLOW-UP VISIT: CPT | Performed by: THORACIC SURGERY (CARDIOTHORACIC VASCULAR SURGERY)

## 2022-05-24 PROCEDURE — 97165 OT EVAL LOW COMPLEX 30 MIN: CPT

## 2022-05-24 PROCEDURE — 85025 COMPLETE CBC W/AUTO DIFF WBC: CPT | Performed by: INTERNAL MEDICINE

## 2022-05-24 PROCEDURE — 97597 DBRDMT OPN WND 1ST 20 CM/<: CPT

## 2022-05-24 PROCEDURE — 25010000002 PIPERACILLIN SOD-TAZOBACTAM PER 1 G

## 2022-05-24 RX ORDER — FUROSEMIDE 10 MG/ML
40 INJECTION INTRAMUSCULAR; INTRAVENOUS ONCE
Status: DISCONTINUED | OUTPATIENT
Start: 2022-05-24 | End: 2022-05-31

## 2022-05-24 RX ADMIN — HYDROCODONE BITARTRATE AND ACETAMINOPHEN 1 TABLET: 5; 325 TABLET ORAL at 08:45

## 2022-05-24 RX ADMIN — PANTOPRAZOLE SODIUM 40 MG: 40 TABLET, DELAYED RELEASE ORAL at 08:45

## 2022-05-24 RX ADMIN — DAKIN'S SOLUTION 0.125% (QUARTER STRENGTH): 0.12 SOLUTION at 10:12

## 2022-05-24 RX ADMIN — RIVAROXABAN 2.5 MG: 2.5 TABLET, FILM COATED ORAL at 17:44

## 2022-05-24 RX ADMIN — Medication 10 ML: at 08:46

## 2022-05-24 RX ADMIN — RIVAROXABAN 2.5 MG: 2.5 TABLET, FILM COATED ORAL at 08:44

## 2022-05-24 RX ADMIN — Medication 1 PATCH: at 13:05

## 2022-05-24 RX ADMIN — ROSUVASTATIN 20 MG: 20 TABLET, FILM COATED ORAL at 08:45

## 2022-05-24 RX ADMIN — Medication 10 ML: at 21:37

## 2022-05-24 RX ADMIN — HYDROCODONE BITARTRATE AND ACETAMINOPHEN 1 TABLET: 5; 325 TABLET ORAL at 21:37

## 2022-05-24 RX ADMIN — DAPTOMYCIN 250 MG: 500 INJECTION, POWDER, LYOPHILIZED, FOR SOLUTION INTRAVENOUS at 13:05

## 2022-05-24 RX ADMIN — HYDROCODONE BITARTRATE AND ACETAMINOPHEN 1 TABLET: 5; 325 TABLET ORAL at 17:44

## 2022-05-24 RX ADMIN — ASPIRIN 81 MG: 81 TABLET, COATED ORAL at 08:44

## 2022-05-24 RX ADMIN — TAZOBACTAM SODIUM AND PIPERACILLIN SODIUM 3.38 G: 375; 3 INJECTION, SOLUTION INTRAVENOUS at 14:51

## 2022-05-24 RX ADMIN — AMLODIPINE BESYLATE 10 MG: 10 TABLET ORAL at 08:45

## 2022-05-24 RX ADMIN — TAZOBACTAM SODIUM AND PIPERACILLIN SODIUM 3.38 G: 375; 3 INJECTION, SOLUTION INTRAVENOUS at 06:46

## 2022-05-24 RX ADMIN — SODIUM CHLORIDE 100 ML/HR: 9 INJECTION, SOLUTION INTRAVENOUS at 19:15

## 2022-05-24 RX ADMIN — TAZOBACTAM SODIUM AND PIPERACILLIN SODIUM 3.38 G: 375; 3 INJECTION, SOLUTION INTRAVENOUS at 21:36

## 2022-05-24 RX ADMIN — DAKIN'S SOLUTION 0.125% (QUARTER STRENGTH): 0.12 SOLUTION at 21:40

## 2022-05-25 PROBLEM — E44.0 MODERATE MALNUTRITION (HCC): Status: ACTIVE | Noted: 2022-05-25

## 2022-05-25 LAB
ANION GAP SERPL CALCULATED.3IONS-SCNC: 8 MMOL/L (ref 5–15)
BACTERIA SPEC AEROBE CULT: ABNORMAL
BUN SERPL-MCNC: 7 MG/DL (ref 8–23)
BUN/CREAT SERPL: 8.5 (ref 7–25)
CALCIUM SPEC-SCNC: 8.4 MG/DL (ref 8.6–10.5)
CHLORIDE SERPL-SCNC: 104 MMOL/L (ref 98–107)
CO2 SERPL-SCNC: 22 MMOL/L (ref 22–29)
CREAT SERPL-MCNC: 0.82 MG/DL (ref 0.57–1)
DEPRECATED RDW RBC AUTO: 42 FL (ref 37–54)
EGFRCR SERPLBLD CKD-EPI 2021: 82 ML/MIN/1.73
ERYTHROCYTE [DISTWIDTH] IN BLOOD BY AUTOMATED COUNT: 13.4 % (ref 12.3–15.4)
GLUCOSE SERPL-MCNC: 89 MG/DL (ref 65–99)
GRAM STN SPEC: ABNORMAL
GRAM STN SPEC: ABNORMAL
HCT VFR BLD AUTO: 22 % (ref 34–46.6)
HGB BLD-MCNC: 7.5 G/DL (ref 12–15.9)
MCH RBC QN AUTO: 29.9 PG (ref 26.6–33)
MCHC RBC AUTO-ENTMCNC: 34.1 G/DL (ref 31.5–35.7)
MCV RBC AUTO: 87.6 FL (ref 79–97)
PLATELET # BLD AUTO: 359 10*3/MM3 (ref 140–450)
PMV BLD AUTO: 10.3 FL (ref 6–12)
POTASSIUM SERPL-SCNC: 3.7 MMOL/L (ref 3.5–5.2)
RBC # BLD AUTO: 2.51 10*6/MM3 (ref 3.77–5.28)
SODIUM SERPL-SCNC: 134 MMOL/L (ref 136–145)
WBC NRBC COR # BLD: 7.17 10*3/MM3 (ref 3.4–10.8)

## 2022-05-25 PROCEDURE — 99024 POSTOP FOLLOW-UP VISIT: CPT | Performed by: THORACIC SURGERY (CARDIOTHORACIC VASCULAR SURGERY)

## 2022-05-25 PROCEDURE — 25010000002 PIPERACILLIN SOD-TAZOBACTAM PER 1 G

## 2022-05-25 PROCEDURE — 25010000002 HYDROMORPHONE PER 4 MG: Performed by: HOSPITALIST

## 2022-05-25 PROCEDURE — 25010000002 DAPTOMYCIN PER 1 MG: Performed by: INTERNAL MEDICINE

## 2022-05-25 PROCEDURE — 85027 COMPLETE CBC AUTOMATED: CPT | Performed by: HOSPITALIST

## 2022-05-25 PROCEDURE — 99231 SBSQ HOSP IP/OBS SF/LOW 25: CPT | Performed by: HOSPITALIST

## 2022-05-25 PROCEDURE — 97597 DBRDMT OPN WND 1ST 20 CM/<: CPT

## 2022-05-25 PROCEDURE — 80048 BASIC METABOLIC PNL TOTAL CA: CPT | Performed by: HOSPITALIST

## 2022-05-25 RX ORDER — HYDROMORPHONE HYDROCHLORIDE 1 MG/ML
0.5 INJECTION, SOLUTION INTRAMUSCULAR; INTRAVENOUS; SUBCUTANEOUS EVERY 8 HOURS PRN
Status: DISCONTINUED | OUTPATIENT
Start: 2022-05-25 | End: 2022-05-31

## 2022-05-25 RX ORDER — OXYCODONE AND ACETAMINOPHEN 7.5; 325 MG/1; MG/1
1 TABLET ORAL EVERY 4 HOURS PRN
Status: DISCONTINUED | OUTPATIENT
Start: 2022-05-25 | End: 2022-05-25

## 2022-05-25 RX ORDER — HYDROCODONE BITARTRATE AND ACETAMINOPHEN 5; 325 MG/1; MG/1
1 TABLET ORAL EVERY 4 HOURS PRN
Status: DISCONTINUED | OUTPATIENT
Start: 2022-05-25 | End: 2022-06-01 | Stop reason: HOSPADM

## 2022-05-25 RX ORDER — ALPRAZOLAM 0.5 MG/1
0.5 TABLET ORAL 2 TIMES DAILY PRN
Status: DISPENSED | OUTPATIENT
Start: 2022-05-25 | End: 2022-06-01

## 2022-05-25 RX ADMIN — RIVAROXABAN 2.5 MG: 2.5 TABLET, FILM COATED ORAL at 18:04

## 2022-05-25 RX ADMIN — ASPIRIN 81 MG: 81 TABLET, COATED ORAL at 09:26

## 2022-05-25 RX ADMIN — Medication 1 PATCH: at 16:12

## 2022-05-25 RX ADMIN — Medication 10 ML: at 20:40

## 2022-05-25 RX ADMIN — TAZOBACTAM SODIUM AND PIPERACILLIN SODIUM 3.38 G: 375; 3 INJECTION, SOLUTION INTRAVENOUS at 20:40

## 2022-05-25 RX ADMIN — HYDROCODONE BITARTRATE AND ACETAMINOPHEN 1 TABLET: 5; 325 TABLET ORAL at 18:04

## 2022-05-25 RX ADMIN — HYDROMORPHONE HYDROCHLORIDE 0.5 MG: 1 INJECTION, SOLUTION INTRAMUSCULAR; INTRAVENOUS; SUBCUTANEOUS at 10:35

## 2022-05-25 RX ADMIN — SODIUM CHLORIDE 100 ML/HR: 9 INJECTION, SOLUTION INTRAVENOUS at 05:58

## 2022-05-25 RX ADMIN — ROSUVASTATIN 20 MG: 20 TABLET, FILM COATED ORAL at 06:16

## 2022-05-25 RX ADMIN — TAZOBACTAM SODIUM AND PIPERACILLIN SODIUM 3.38 G: 375; 3 INJECTION, SOLUTION INTRAVENOUS at 06:15

## 2022-05-25 RX ADMIN — DAPTOMYCIN 250 MG: 500 INJECTION, POWDER, LYOPHILIZED, FOR SOLUTION INTRAVENOUS at 13:19

## 2022-05-25 RX ADMIN — Medication 10 ML: at 09:26

## 2022-05-25 RX ADMIN — PANTOPRAZOLE SODIUM 40 MG: 40 TABLET, DELAYED RELEASE ORAL at 06:16

## 2022-05-25 RX ADMIN — SODIUM CHLORIDE 100 ML/HR: 9 INJECTION, SOLUTION INTRAVENOUS at 18:05

## 2022-05-25 RX ADMIN — RIVAROXABAN 2.5 MG: 2.5 TABLET, FILM COATED ORAL at 09:26

## 2022-05-25 RX ADMIN — DAKIN'S SOLUTION 0.125% (QUARTER STRENGTH): 0.12 SOLUTION at 20:43

## 2022-05-25 RX ADMIN — HYDROMORPHONE HYDROCHLORIDE 0.5 MG: 1 INJECTION, SOLUTION INTRAMUSCULAR; INTRAVENOUS; SUBCUTANEOUS at 20:38

## 2022-05-25 RX ADMIN — TAZOBACTAM SODIUM AND PIPERACILLIN SODIUM 3.38 G: 375; 3 INJECTION, SOLUTION INTRAVENOUS at 16:12

## 2022-05-26 LAB
ANION GAP SERPL CALCULATED.3IONS-SCNC: 11 MMOL/L (ref 5–15)
BUN SERPL-MCNC: 5 MG/DL (ref 8–23)
BUN/CREAT SERPL: 8.2 (ref 7–25)
CALCIUM SPEC-SCNC: 8.8 MG/DL (ref 8.6–10.5)
CHLORIDE SERPL-SCNC: 103 MMOL/L (ref 98–107)
CO2 SERPL-SCNC: 22 MMOL/L (ref 22–29)
CREAT SERPL-MCNC: 0.61 MG/DL (ref 0.57–1)
DEPRECATED RDW RBC AUTO: 43.9 FL (ref 37–54)
EGFRCR SERPLBLD CKD-EPI 2021: 102.5 ML/MIN/1.73
ERYTHROCYTE [DISTWIDTH] IN BLOOD BY AUTOMATED COUNT: 13.5 % (ref 12.3–15.4)
GLUCOSE SERPL-MCNC: 91 MG/DL (ref 65–99)
HCT VFR BLD AUTO: 25.5 % (ref 34–46.6)
HGB BLD-MCNC: 8.7 G/DL (ref 12–15.9)
MCH RBC QN AUTO: 30.5 PG (ref 26.6–33)
MCHC RBC AUTO-ENTMCNC: 34.1 G/DL (ref 31.5–35.7)
MCV RBC AUTO: 89.5 FL (ref 79–97)
PLATELET # BLD AUTO: 350 10*3/MM3 (ref 140–450)
PMV BLD AUTO: 10.3 FL (ref 6–12)
POTASSIUM SERPL-SCNC: 3.7 MMOL/L (ref 3.5–5.2)
RBC # BLD AUTO: 2.85 10*6/MM3 (ref 3.77–5.28)
SODIUM SERPL-SCNC: 136 MMOL/L (ref 136–145)
WBC NRBC COR # BLD: 7.28 10*3/MM3 (ref 3.4–10.8)

## 2022-05-26 PROCEDURE — 25010000002 PIPERACILLIN SOD-TAZOBACTAM PER 1 G

## 2022-05-26 PROCEDURE — 25010000002 HYDROMORPHONE PER 4 MG: Performed by: HOSPITALIST

## 2022-05-26 PROCEDURE — 85027 COMPLETE CBC AUTOMATED: CPT

## 2022-05-26 PROCEDURE — 97530 THERAPEUTIC ACTIVITIES: CPT

## 2022-05-26 PROCEDURE — 99231 SBSQ HOSP IP/OBS SF/LOW 25: CPT | Performed by: HOSPITALIST

## 2022-05-26 PROCEDURE — 97164 PT RE-EVAL EST PLAN CARE: CPT

## 2022-05-26 PROCEDURE — 97597 DBRDMT OPN WND 1ST 20 CM/<: CPT

## 2022-05-26 PROCEDURE — 80048 BASIC METABOLIC PNL TOTAL CA: CPT | Performed by: HOSPITALIST

## 2022-05-26 PROCEDURE — 99024 POSTOP FOLLOW-UP VISIT: CPT | Performed by: THORACIC SURGERY (CARDIOTHORACIC VASCULAR SURGERY)

## 2022-05-26 RX ADMIN — AMLODIPINE BESYLATE 10 MG: 10 TABLET ORAL at 05:07

## 2022-05-26 RX ADMIN — RIVAROXABAN 2.5 MG: 2.5 TABLET, FILM COATED ORAL at 17:13

## 2022-05-26 RX ADMIN — PANTOPRAZOLE SODIUM 40 MG: 40 TABLET, DELAYED RELEASE ORAL at 05:07

## 2022-05-26 RX ADMIN — TAZOBACTAM SODIUM AND PIPERACILLIN SODIUM 3.38 G: 375; 3 INJECTION, SOLUTION INTRAVENOUS at 05:07

## 2022-05-26 RX ADMIN — Medication 10 ML: at 21:17

## 2022-05-26 RX ADMIN — DAKIN'S SOLUTION 0.125% (QUARTER STRENGTH): 0.12 SOLUTION at 21:20

## 2022-05-26 RX ADMIN — ROSUVASTATIN 20 MG: 20 TABLET, FILM COATED ORAL at 05:07

## 2022-05-26 RX ADMIN — TAZOBACTAM SODIUM AND PIPERACILLIN SODIUM 3.38 G: 375; 3 INJECTION, SOLUTION INTRAVENOUS at 15:15

## 2022-05-26 RX ADMIN — HYDROMORPHONE HYDROCHLORIDE 0.5 MG: 1 INJECTION, SOLUTION INTRAMUSCULAR; INTRAVENOUS; SUBCUTANEOUS at 21:17

## 2022-05-26 RX ADMIN — SODIUM CHLORIDE 100 ML/HR: 9 INJECTION, SOLUTION INTRAVENOUS at 05:11

## 2022-05-26 RX ADMIN — DAKIN'S SOLUTION 0.125% (QUARTER STRENGTH): 0.12 SOLUTION at 08:48

## 2022-05-26 RX ADMIN — TAZOBACTAM SODIUM AND PIPERACILLIN SODIUM 3.38 G: 375; 3 INJECTION, SOLUTION INTRAVENOUS at 21:31

## 2022-05-26 RX ADMIN — Medication 1 PATCH: at 15:15

## 2022-05-26 RX ADMIN — HYDROMORPHONE HYDROCHLORIDE 0.5 MG: 1 INJECTION, SOLUTION INTRAMUSCULAR; INTRAVENOUS; SUBCUTANEOUS at 10:36

## 2022-05-26 RX ADMIN — RIVAROXABAN 2.5 MG: 2.5 TABLET, FILM COATED ORAL at 08:47

## 2022-05-26 RX ADMIN — ASPIRIN 81 MG: 81 TABLET, COATED ORAL at 08:47

## 2022-05-27 LAB
DEPRECATED RDW RBC AUTO: 43.8 FL (ref 37–54)
ERYTHROCYTE [DISTWIDTH] IN BLOOD BY AUTOMATED COUNT: 13.6 % (ref 12.3–15.4)
HCT VFR BLD AUTO: 22.8 % (ref 34–46.6)
HGB BLD-MCNC: 7.9 G/DL (ref 12–15.9)
MCH RBC QN AUTO: 30.7 PG (ref 26.6–33)
MCHC RBC AUTO-ENTMCNC: 34.6 G/DL (ref 31.5–35.7)
MCV RBC AUTO: 88.7 FL (ref 79–97)
PLATELET # BLD AUTO: 304 10*3/MM3 (ref 140–450)
PMV BLD AUTO: 10 FL (ref 6–12)
RBC # BLD AUTO: 2.57 10*6/MM3 (ref 3.77–5.28)
WBC NRBC COR # BLD: 8.77 10*3/MM3 (ref 3.4–10.8)

## 2022-05-27 PROCEDURE — 97597 DBRDMT OPN WND 1ST 20 CM/<: CPT

## 2022-05-27 PROCEDURE — 99024 POSTOP FOLLOW-UP VISIT: CPT | Performed by: THORACIC SURGERY (CARDIOTHORACIC VASCULAR SURGERY)

## 2022-05-27 PROCEDURE — 25010000002 PIPERACILLIN SOD-TAZOBACTAM PER 1 G

## 2022-05-27 PROCEDURE — 99232 SBSQ HOSP IP/OBS MODERATE 35: CPT | Performed by: HOSPITALIST

## 2022-05-27 PROCEDURE — 25010000002 HYDROMORPHONE PER 4 MG: Performed by: HOSPITALIST

## 2022-05-27 PROCEDURE — 85027 COMPLETE CBC AUTOMATED: CPT

## 2022-05-27 RX ADMIN — HYDROMORPHONE HYDROCHLORIDE 0.5 MG: 1 INJECTION, SOLUTION INTRAMUSCULAR; INTRAVENOUS; SUBCUTANEOUS at 20:48

## 2022-05-27 RX ADMIN — ASPIRIN 81 MG: 81 TABLET, COATED ORAL at 08:21

## 2022-05-27 RX ADMIN — HYDROMORPHONE HYDROCHLORIDE 0.5 MG: 1 INJECTION, SOLUTION INTRAMUSCULAR; INTRAVENOUS; SUBCUTANEOUS at 11:12

## 2022-05-27 RX ADMIN — PANTOPRAZOLE SODIUM 40 MG: 40 TABLET, DELAYED RELEASE ORAL at 08:22

## 2022-05-27 RX ADMIN — AMLODIPINE BESYLATE 10 MG: 10 TABLET ORAL at 08:19

## 2022-05-27 RX ADMIN — TAZOBACTAM SODIUM AND PIPERACILLIN SODIUM 3.38 G: 375; 3 INJECTION, SOLUTION INTRAVENOUS at 05:43

## 2022-05-27 RX ADMIN — RIVAROXABAN 2.5 MG: 2.5 TABLET, FILM COATED ORAL at 08:19

## 2022-05-27 RX ADMIN — Medication 10 ML: at 08:23

## 2022-05-27 RX ADMIN — ALPRAZOLAM 0.5 MG: 0.5 TABLET ORAL at 08:20

## 2022-05-27 RX ADMIN — Medication 10 ML: at 20:50

## 2022-05-27 RX ADMIN — Medication 1 PATCH: at 14:50

## 2022-05-27 RX ADMIN — ROSUVASTATIN 20 MG: 20 TABLET, FILM COATED ORAL at 08:19

## 2022-05-27 RX ADMIN — TAZOBACTAM SODIUM AND PIPERACILLIN SODIUM 3.38 G: 375; 3 INJECTION, SOLUTION INTRAVENOUS at 21:50

## 2022-05-27 RX ADMIN — TAZOBACTAM SODIUM AND PIPERACILLIN SODIUM 3.38 G: 375; 3 INJECTION, SOLUTION INTRAVENOUS at 14:50

## 2022-05-27 RX ADMIN — DAKIN'S SOLUTION 0.125% (QUARTER STRENGTH): 0.12 SOLUTION at 20:50

## 2022-05-27 RX ADMIN — RIVAROXABAN 2.5 MG: 2.5 TABLET, FILM COATED ORAL at 17:25

## 2022-05-27 RX ADMIN — ALPRAZOLAM 0.5 MG: 0.5 TABLET ORAL at 21:53

## 2022-05-27 RX ADMIN — DAKIN'S SOLUTION 0.125% (QUARTER STRENGTH): 0.12 SOLUTION at 08:23

## 2022-05-28 LAB
BACTERIA SPEC AEROBE CULT: NORMAL
BACTERIA SPEC AEROBE CULT: NORMAL
DEPRECATED RDW RBC AUTO: 45.1 FL (ref 37–54)
ERYTHROCYTE [DISTWIDTH] IN BLOOD BY AUTOMATED COUNT: 14.1 % (ref 12.3–15.4)
HCT VFR BLD AUTO: 24.6 % (ref 34–46.6)
HGB BLD-MCNC: 8.3 G/DL (ref 12–15.9)
MCH RBC QN AUTO: 30.3 PG (ref 26.6–33)
MCHC RBC AUTO-ENTMCNC: 33.7 G/DL (ref 31.5–35.7)
MCV RBC AUTO: 89.8 FL (ref 79–97)
PLATELET # BLD AUTO: 330 10*3/MM3 (ref 140–450)
PMV BLD AUTO: 10.8 FL (ref 6–12)
RBC # BLD AUTO: 2.74 10*6/MM3 (ref 3.77–5.28)
WBC NRBC COR # BLD: 8.11 10*3/MM3 (ref 3.4–10.8)

## 2022-05-28 PROCEDURE — 99231 SBSQ HOSP IP/OBS SF/LOW 25: CPT | Performed by: INTERNAL MEDICINE

## 2022-05-28 PROCEDURE — 85027 COMPLETE CBC AUTOMATED: CPT

## 2022-05-28 PROCEDURE — 25010000002 HYDROMORPHONE PER 4 MG: Performed by: HOSPITALIST

## 2022-05-28 PROCEDURE — 25010000002 PIPERACILLIN SOD-TAZOBACTAM PER 1 G

## 2022-05-28 RX ADMIN — HYDROMORPHONE HYDROCHLORIDE 0.5 MG: 1 INJECTION, SOLUTION INTRAMUSCULAR; INTRAVENOUS; SUBCUTANEOUS at 22:24

## 2022-05-28 RX ADMIN — RIVAROXABAN 2.5 MG: 2.5 TABLET, FILM COATED ORAL at 17:25

## 2022-05-28 RX ADMIN — HYDROMORPHONE HYDROCHLORIDE 0.5 MG: 1 INJECTION, SOLUTION INTRAMUSCULAR; INTRAVENOUS; SUBCUTANEOUS at 14:57

## 2022-05-28 RX ADMIN — ALPRAZOLAM 0.5 MG: 0.5 TABLET ORAL at 08:18

## 2022-05-28 RX ADMIN — Medication 1 PATCH: at 15:01

## 2022-05-28 RX ADMIN — ROSUVASTATIN 20 MG: 20 TABLET, FILM COATED ORAL at 06:43

## 2022-05-28 RX ADMIN — AMLODIPINE BESYLATE 10 MG: 10 TABLET ORAL at 06:42

## 2022-05-28 RX ADMIN — ASPIRIN 81 MG: 81 TABLET, COATED ORAL at 08:18

## 2022-05-28 RX ADMIN — PANTOPRAZOLE SODIUM 40 MG: 40 TABLET, DELAYED RELEASE ORAL at 06:42

## 2022-05-28 RX ADMIN — DAKIN'S SOLUTION 0.125% (QUARTER STRENGTH): 0.12 SOLUTION at 21:15

## 2022-05-28 RX ADMIN — Medication 10 ML: at 08:18

## 2022-05-28 RX ADMIN — SENNOSIDES AND DOCUSATE SODIUM 2 TABLET: 50; 8.6 TABLET ORAL at 08:18

## 2022-05-28 RX ADMIN — Medication 10 ML: at 21:15

## 2022-05-28 RX ADMIN — RIVAROXABAN 2.5 MG: 2.5 TABLET, FILM COATED ORAL at 08:18

## 2022-05-28 RX ADMIN — DAKIN'S SOLUTION 0.125% (QUARTER STRENGTH): 0.12 SOLUTION at 08:18

## 2022-05-28 RX ADMIN — TAZOBACTAM SODIUM AND PIPERACILLIN SODIUM 3.38 G: 375; 3 INJECTION, SOLUTION INTRAVENOUS at 21:29

## 2022-05-28 RX ADMIN — ALPRAZOLAM 0.5 MG: 0.5 TABLET ORAL at 21:14

## 2022-05-28 RX ADMIN — TAZOBACTAM SODIUM AND PIPERACILLIN SODIUM 3.38 G: 375; 3 INJECTION, SOLUTION INTRAVENOUS at 06:12

## 2022-05-28 RX ADMIN — TAZOBACTAM SODIUM AND PIPERACILLIN SODIUM 3.38 G: 375; 3 INJECTION, SOLUTION INTRAVENOUS at 14:57

## 2022-05-29 LAB
DEPRECATED RDW RBC AUTO: 46.3 FL (ref 37–54)
ERYTHROCYTE [DISTWIDTH] IN BLOOD BY AUTOMATED COUNT: 14.5 % (ref 12.3–15.4)
HCT VFR BLD AUTO: 24.9 % (ref 34–46.6)
HGB BLD-MCNC: 8.3 G/DL (ref 12–15.9)
MCH RBC QN AUTO: 30.2 PG (ref 26.6–33)
MCHC RBC AUTO-ENTMCNC: 33.3 G/DL (ref 31.5–35.7)
MCV RBC AUTO: 90.5 FL (ref 79–97)
PLATELET # BLD AUTO: 340 10*3/MM3 (ref 140–450)
PMV BLD AUTO: 11.3 FL (ref 6–12)
RBC # BLD AUTO: 2.75 10*6/MM3 (ref 3.77–5.28)
WBC NRBC COR # BLD: 7.87 10*3/MM3 (ref 3.4–10.8)

## 2022-05-29 PROCEDURE — 25010000002 PIPERACILLIN SOD-TAZOBACTAM PER 1 G

## 2022-05-29 PROCEDURE — 25010000002 HYDROMORPHONE PER 4 MG: Performed by: HOSPITALIST

## 2022-05-29 PROCEDURE — 97597 DBRDMT OPN WND 1ST 20 CM/<: CPT

## 2022-05-29 PROCEDURE — 99231 SBSQ HOSP IP/OBS SF/LOW 25: CPT | Performed by: INTERNAL MEDICINE

## 2022-05-29 PROCEDURE — 99024 POSTOP FOLLOW-UP VISIT: CPT | Performed by: THORACIC SURGERY (CARDIOTHORACIC VASCULAR SURGERY)

## 2022-05-29 PROCEDURE — 85027 COMPLETE CBC AUTOMATED: CPT

## 2022-05-29 RX ADMIN — Medication 1 PATCH: at 14:26

## 2022-05-29 RX ADMIN — DAKIN'S SOLUTION 0.125% (QUARTER STRENGTH): 0.12 SOLUTION at 21:10

## 2022-05-29 RX ADMIN — DAKIN'S SOLUTION 0.125% (QUARTER STRENGTH): 0.12 SOLUTION at 09:07

## 2022-05-29 RX ADMIN — ROSUVASTATIN 20 MG: 20 TABLET, FILM COATED ORAL at 06:02

## 2022-05-29 RX ADMIN — PANTOPRAZOLE SODIUM 40 MG: 40 TABLET, DELAYED RELEASE ORAL at 06:02

## 2022-05-29 RX ADMIN — ALPRAZOLAM 0.5 MG: 0.5 TABLET ORAL at 21:18

## 2022-05-29 RX ADMIN — TAZOBACTAM SODIUM AND PIPERACILLIN SODIUM 3.38 G: 375; 3 INJECTION, SOLUTION INTRAVENOUS at 06:01

## 2022-05-29 RX ADMIN — HYDROMORPHONE HYDROCHLORIDE 0.5 MG: 1 INJECTION, SOLUTION INTRAMUSCULAR; INTRAVENOUS; SUBCUTANEOUS at 14:26

## 2022-05-29 RX ADMIN — Medication 10 ML: at 09:07

## 2022-05-29 RX ADMIN — RIVAROXABAN 2.5 MG: 2.5 TABLET, FILM COATED ORAL at 09:07

## 2022-05-29 RX ADMIN — TAZOBACTAM SODIUM AND PIPERACILLIN SODIUM 3.38 G: 375; 3 INJECTION, SOLUTION INTRAVENOUS at 14:26

## 2022-05-29 RX ADMIN — TAZOBACTAM SODIUM AND PIPERACILLIN SODIUM 3.38 G: 375; 3 INJECTION, SOLUTION INTRAVENOUS at 21:10

## 2022-05-29 RX ADMIN — ALPRAZOLAM 0.5 MG: 0.5 TABLET ORAL at 09:07

## 2022-05-29 RX ADMIN — AMLODIPINE BESYLATE 10 MG: 10 TABLET ORAL at 06:02

## 2022-05-29 RX ADMIN — ASPIRIN 81 MG: 81 TABLET, COATED ORAL at 09:07

## 2022-05-29 RX ADMIN — HYDROMORPHONE HYDROCHLORIDE 0.5 MG: 1 INJECTION, SOLUTION INTRAMUSCULAR; INTRAVENOUS; SUBCUTANEOUS at 20:53

## 2022-05-29 RX ADMIN — RIVAROXABAN 2.5 MG: 2.5 TABLET, FILM COATED ORAL at 18:04

## 2022-05-30 LAB
DEPRECATED RDW RBC AUTO: 51.6 FL (ref 37–54)
ERYTHROCYTE [DISTWIDTH] IN BLOOD BY AUTOMATED COUNT: 15 % (ref 12.3–15.4)
HCT VFR BLD AUTO: 26.3 % (ref 34–46.6)
HGB BLD-MCNC: 8.5 G/DL (ref 12–15.9)
MCH RBC QN AUTO: 30.8 PG (ref 26.6–33)
MCHC RBC AUTO-ENTMCNC: 32.3 G/DL (ref 31.5–35.7)
MCV RBC AUTO: 95.3 FL (ref 79–97)
PLATELET # BLD AUTO: 306 10*3/MM3 (ref 140–450)
PMV BLD AUTO: 10.7 FL (ref 6–12)
RBC # BLD AUTO: 2.76 10*6/MM3 (ref 3.77–5.28)
WBC NRBC COR # BLD: 8.43 10*3/MM3 (ref 3.4–10.8)

## 2022-05-30 PROCEDURE — 85027 COMPLETE CBC AUTOMATED: CPT

## 2022-05-30 PROCEDURE — 25010000002 PIPERACILLIN SOD-TAZOBACTAM PER 1 G

## 2022-05-30 PROCEDURE — 99232 SBSQ HOSP IP/OBS MODERATE 35: CPT | Performed by: NURSE PRACTITIONER

## 2022-05-30 PROCEDURE — 25010000002 HYDROMORPHONE PER 4 MG: Performed by: HOSPITALIST

## 2022-05-30 PROCEDURE — 97597 DBRDMT OPN WND 1ST 20 CM/<: CPT

## 2022-05-30 RX ADMIN — SENNOSIDES AND DOCUSATE SODIUM 2 TABLET: 50; 8.6 TABLET ORAL at 22:40

## 2022-05-30 RX ADMIN — TAZOBACTAM SODIUM AND PIPERACILLIN SODIUM 3.38 G: 375; 3 INJECTION, SOLUTION INTRAVENOUS at 06:59

## 2022-05-30 RX ADMIN — RIVAROXABAN 2.5 MG: 2.5 TABLET, FILM COATED ORAL at 17:20

## 2022-05-30 RX ADMIN — ASPIRIN 81 MG: 81 TABLET, COATED ORAL at 08:06

## 2022-05-30 RX ADMIN — HYDROCODONE BITARTRATE AND ACETAMINOPHEN 1 TABLET: 5; 325 TABLET ORAL at 22:40

## 2022-05-30 RX ADMIN — ROSUVASTATIN 20 MG: 20 TABLET, FILM COATED ORAL at 06:46

## 2022-05-30 RX ADMIN — Medication 1 PATCH: at 15:03

## 2022-05-30 RX ADMIN — RIVAROXABAN 2.5 MG: 2.5 TABLET, FILM COATED ORAL at 08:06

## 2022-05-30 RX ADMIN — DAKIN'S SOLUTION 0.125% (QUARTER STRENGTH): 0.12 SOLUTION at 22:41

## 2022-05-30 RX ADMIN — ALPRAZOLAM 0.5 MG: 0.5 TABLET ORAL at 15:03

## 2022-05-30 RX ADMIN — PANTOPRAZOLE SODIUM 40 MG: 40 TABLET, DELAYED RELEASE ORAL at 06:46

## 2022-05-30 RX ADMIN — AMLODIPINE BESYLATE 10 MG: 10 TABLET ORAL at 06:47

## 2022-05-30 RX ADMIN — HYDROMORPHONE HYDROCHLORIDE 0.5 MG: 1 INJECTION, SOLUTION INTRAMUSCULAR; INTRAVENOUS; SUBCUTANEOUS at 08:38

## 2022-05-30 RX ADMIN — HYDROMORPHONE HYDROCHLORIDE 0.5 MG: 1 INJECTION, SOLUTION INTRAMUSCULAR; INTRAVENOUS; SUBCUTANEOUS at 23:53

## 2022-05-30 RX ADMIN — TAZOBACTAM SODIUM AND PIPERACILLIN SODIUM 3.38 G: 375; 3 INJECTION, SOLUTION INTRAVENOUS at 15:03

## 2022-05-30 RX ADMIN — Medication 10 ML: at 08:06

## 2022-05-30 RX ADMIN — Medication 10 ML: at 22:40

## 2022-05-31 LAB
DEPRECATED RDW RBC AUTO: 49.1 FL (ref 37–54)
ERYTHROCYTE [DISTWIDTH] IN BLOOD BY AUTOMATED COUNT: 15.1 % (ref 12.3–15.4)
HCT VFR BLD AUTO: 25.6 % (ref 34–46.6)
HGB BLD-MCNC: 8.5 G/DL (ref 12–15.9)
MCH RBC QN AUTO: 29.6 PG (ref 26.6–33)
MCHC RBC AUTO-ENTMCNC: 33.2 G/DL (ref 31.5–35.7)
MCV RBC AUTO: 89.2 FL (ref 79–97)
PLATELET # BLD AUTO: 319 10*3/MM3 (ref 140–450)
PMV BLD AUTO: 10.9 FL (ref 6–12)
RBC # BLD AUTO: 2.87 10*6/MM3 (ref 3.77–5.28)
WBC NRBC COR # BLD: 7.95 10*3/MM3 (ref 3.4–10.8)

## 2022-05-31 PROCEDURE — 25010000002 PIPERACILLIN SOD-TAZOBACTAM PER 1 G: Performed by: INTERNAL MEDICINE

## 2022-05-31 PROCEDURE — 25010000002 HYDROMORPHONE PER 4 MG: Performed by: INTERNAL MEDICINE

## 2022-05-31 PROCEDURE — 97605 NEG PRS WND THER DME<=50SQCM: CPT

## 2022-05-31 PROCEDURE — 85027 COMPLETE CBC AUTOMATED: CPT

## 2022-05-31 PROCEDURE — 25010000002 HYDROMORPHONE PER 4 MG: Performed by: HOSPITALIST

## 2022-05-31 PROCEDURE — 99232 SBSQ HOSP IP/OBS MODERATE 35: CPT | Performed by: NURSE PRACTITIONER

## 2022-05-31 PROCEDURE — 25010000002 FUROSEMIDE PER 20 MG

## 2022-05-31 PROCEDURE — 99024 POSTOP FOLLOW-UP VISIT: CPT | Performed by: THORACIC SURGERY (CARDIOTHORACIC VASCULAR SURGERY)

## 2022-05-31 RX ORDER — FUROSEMIDE 10 MG/ML
40 INJECTION INTRAMUSCULAR; INTRAVENOUS ONCE
Status: COMPLETED | OUTPATIENT
Start: 2022-05-31 | End: 2022-05-31

## 2022-05-31 RX ORDER — HYDROMORPHONE HYDROCHLORIDE 1 MG/ML
0.5 INJECTION, SOLUTION INTRAMUSCULAR; INTRAVENOUS; SUBCUTANEOUS EVERY 4 HOURS PRN
Status: DISCONTINUED | OUTPATIENT
Start: 2022-05-31 | End: 2022-06-01 | Stop reason: HOSPADM

## 2022-05-31 RX ADMIN — Medication 1 PATCH: at 15:16

## 2022-05-31 RX ADMIN — DAKIN'S SOLUTION 0.125% (QUARTER STRENGTH): 0.12 SOLUTION at 08:15

## 2022-05-31 RX ADMIN — HYDROCODONE BITARTRATE AND ACETAMINOPHEN 1 TABLET: 5; 325 TABLET ORAL at 11:32

## 2022-05-31 RX ADMIN — HYDROMORPHONE HYDROCHLORIDE 0.5 MG: 1 INJECTION, SOLUTION INTRAMUSCULAR; INTRAVENOUS; SUBCUTANEOUS at 22:16

## 2022-05-31 RX ADMIN — ASPIRIN 81 MG: 81 TABLET, COATED ORAL at 08:14

## 2022-05-31 RX ADMIN — ROSUVASTATIN 20 MG: 20 TABLET, FILM COATED ORAL at 06:19

## 2022-05-31 RX ADMIN — HYDROCODONE BITARTRATE AND ACETAMINOPHEN 1 TABLET: 5; 325 TABLET ORAL at 17:15

## 2022-05-31 RX ADMIN — ALPRAZOLAM 0.5 MG: 0.5 TABLET ORAL at 22:16

## 2022-05-31 RX ADMIN — RIVAROXABAN 2.5 MG: 2.5 TABLET, FILM COATED ORAL at 08:14

## 2022-05-31 RX ADMIN — HYDROMORPHONE HYDROCHLORIDE 0.5 MG: 1 INJECTION, SOLUTION INTRAMUSCULAR; INTRAVENOUS; SUBCUTANEOUS at 15:17

## 2022-05-31 RX ADMIN — Medication 10 ML: at 22:19

## 2022-05-31 RX ADMIN — HYDROMORPHONE HYDROCHLORIDE 0.5 MG: 1 INJECTION, SOLUTION INTRAMUSCULAR; INTRAVENOUS; SUBCUTANEOUS at 09:56

## 2022-05-31 RX ADMIN — TAZOBACTAM SODIUM AND PIPERACILLIN SODIUM 3.38 G: 375; 3 INJECTION, SOLUTION INTRAVENOUS at 22:16

## 2022-05-31 RX ADMIN — PANTOPRAZOLE SODIUM 40 MG: 40 TABLET, DELAYED RELEASE ORAL at 06:18

## 2022-05-31 RX ADMIN — TAZOBACTAM SODIUM AND PIPERACILLIN SODIUM 3.38 G: 375; 3 INJECTION, SOLUTION INTRAVENOUS at 11:32

## 2022-05-31 RX ADMIN — AMLODIPINE BESYLATE 10 MG: 10 TABLET ORAL at 06:19

## 2022-05-31 RX ADMIN — ALPRAZOLAM 0.5 MG: 0.5 TABLET ORAL at 08:19

## 2022-05-31 RX ADMIN — Medication 10 ML: at 08:15

## 2022-05-31 RX ADMIN — FUROSEMIDE 40 MG: 10 INJECTION, SOLUTION INTRAMUSCULAR; INTRAVENOUS at 08:14

## 2022-05-31 RX ADMIN — RIVAROXABAN 2.5 MG: 2.5 TABLET, FILM COATED ORAL at 17:15

## 2022-06-01 VITALS
HEART RATE: 84 BPM | BODY MASS INDEX: 17.61 KG/M2 | WEIGHT: 99.4 LBS | HEIGHT: 63 IN | DIASTOLIC BLOOD PRESSURE: 63 MMHG | SYSTOLIC BLOOD PRESSURE: 112 MMHG | OXYGEN SATURATION: 92 % | RESPIRATION RATE: 17 BRPM | TEMPERATURE: 98.1 F

## 2022-06-01 LAB
DEPRECATED RDW RBC AUTO: 49.1 FL (ref 37–54)
ERYTHROCYTE [DISTWIDTH] IN BLOOD BY AUTOMATED COUNT: 15.1 % (ref 12.3–15.4)
HCT VFR BLD AUTO: 23.4 % (ref 34–46.6)
HGB BLD-MCNC: 7.9 G/DL (ref 12–15.9)
MCH RBC QN AUTO: 30.4 PG (ref 26.6–33)
MCHC RBC AUTO-ENTMCNC: 33.8 G/DL (ref 31.5–35.7)
MCV RBC AUTO: 90 FL (ref 79–97)
PLATELET # BLD AUTO: 262 10*3/MM3 (ref 140–450)
PMV BLD AUTO: 11.1 FL (ref 6–12)
RBC # BLD AUTO: 2.6 10*6/MM3 (ref 3.77–5.28)
WBC NRBC COR # BLD: 8.91 10*3/MM3 (ref 3.4–10.8)

## 2022-06-01 PROCEDURE — 85027 COMPLETE CBC AUTOMATED: CPT

## 2022-06-01 PROCEDURE — 25010000002 PIPERACILLIN SOD-TAZOBACTAM PER 1 G: Performed by: INTERNAL MEDICINE

## 2022-06-01 PROCEDURE — 99239 HOSP IP/OBS DSCHRG MGMT >30: CPT | Performed by: NURSE PRACTITIONER

## 2022-06-01 PROCEDURE — 99231 SBSQ HOSP IP/OBS SF/LOW 25: CPT

## 2022-06-01 PROCEDURE — 97605 NEG PRS WND THER DME<=50SQCM: CPT

## 2022-06-01 RX ORDER — ALPRAZOLAM 0.5 MG/1
0.5 TABLET ORAL 2 TIMES DAILY PRN
Qty: 6 TABLET | Refills: 0 | Status: SHIPPED | OUTPATIENT
Start: 2022-06-01

## 2022-06-01 RX ORDER — METRONIDAZOLE 500 MG/1
500 TABLET ORAL 3 TIMES DAILY
Qty: 21 TABLET | Refills: 0 | Status: SHIPPED | OUTPATIENT
Start: 2022-06-01 | End: 2022-06-08

## 2022-06-01 RX ORDER — CEFDINIR 300 MG/1
300 CAPSULE ORAL 2 TIMES DAILY
Qty: 14 CAPSULE | Refills: 0 | Status: SHIPPED | OUTPATIENT
Start: 2022-06-01

## 2022-06-01 RX ORDER — HYDROCODONE BITARTRATE AND ACETAMINOPHEN 5; 325 MG/1; MG/1
1 TABLET ORAL EVERY 4 HOURS PRN
Qty: 18 TABLET | Refills: 0 | Status: SHIPPED | OUTPATIENT
Start: 2022-06-01

## 2022-06-01 RX ADMIN — AMLODIPINE BESYLATE 10 MG: 10 TABLET ORAL at 06:00

## 2022-06-01 RX ADMIN — ASPIRIN 81 MG: 81 TABLET, COATED ORAL at 08:15

## 2022-06-01 RX ADMIN — PANTOPRAZOLE SODIUM 40 MG: 40 TABLET, DELAYED RELEASE ORAL at 06:00

## 2022-06-01 RX ADMIN — RIVAROXABAN 2.5 MG: 2.5 TABLET, FILM COATED ORAL at 16:17

## 2022-06-01 RX ADMIN — Medication 10 ML: at 08:15

## 2022-06-01 RX ADMIN — TAZOBACTAM SODIUM AND PIPERACILLIN SODIUM 3.38 G: 375; 3 INJECTION, SOLUTION INTRAVENOUS at 06:00

## 2022-06-01 RX ADMIN — ROSUVASTATIN 20 MG: 20 TABLET, FILM COATED ORAL at 06:00

## 2022-06-01 RX ADMIN — HYDROCODONE BITARTRATE AND ACETAMINOPHEN 1 TABLET: 5; 325 TABLET ORAL at 12:49

## 2022-06-01 RX ADMIN — HYDROCODONE BITARTRATE AND ACETAMINOPHEN 1 TABLET: 5; 325 TABLET ORAL at 08:15

## 2022-06-01 RX ADMIN — RIVAROXABAN 2.5 MG: 2.5 TABLET, FILM COATED ORAL at 08:15

## 2022-06-01 RX ADMIN — Medication 1 PATCH: at 16:17

## 2022-06-01 RX ADMIN — TAZOBACTAM SODIUM AND PIPERACILLIN SODIUM 3.38 G: 375; 3 INJECTION, SOLUTION INTRAVENOUS at 12:49

## 2022-06-01 RX ADMIN — ALPRAZOLAM 0.5 MG: 0.5 TABLET ORAL at 08:22

## 2022-06-01 NOTE — PLAN OF CARE
Goal Outcome Evaluation:  Plan of Care Reviewed With: patient        Progress: improving  Outcome Evaluation: PT converted pt to home wound vac to allow for transition home with OP f/u at the wound care center. PT educated pt and family about home management of unit with canister changes and charging equipment as well as returning the unit once pt finished. Pt and family able to verb understanding.

## 2022-06-01 NOTE — THERAPY WOUND CARE TREATMENT
Acute Care - Wound/Debridement Treatment Note  [facility]     Patient Name: Shannon Chan  : 1960  MRN: [id number]  Today's Date: 2022                Admit Date: 2022    Visit Dx:    ICD-10-CM ICD-9-CM   1. Centrilobular emphysema and active cigarette abuse (Formerly [facility])  J43.2 492.8   2. 80% superior mesenteric artery stenosis (Formerly [facility])  K55.1 557.1   3. S/P endarterectomy of right external iliac, right common femoral, proximal superficial femoral, and profundus femoral artery 2022  Z98.890 V15.29   4. Wound dehiscence  T81.30XA 998.30   5. Anxiety disorder, unspecified type  F41.9 300.00       Patient Active Problem List   Diagnosis   • Essential hypertension   • CAD without angina pectoris with previous stents   • Dyslipidemia   • Mucopurulent chronic bronchitis (Formerly [facility])   • Carotid stenosis, left s/p left CEA    • Extensive PAD (peripheral artery disease) (Formerly [facility])   • Carotid stenosis, asymptomatic, right   • Alcohol use   • Centrilobular emphysema and active cigarette abuse (Formerly [facility])   • 80% superior mesenteric artery stenosis (Formerly [facility])   • Right external iliac, right common femoral, proximal superficial femoral, and profundus femoral artery occlusion (Formerly [facility])   • S/P endarterectomy of right external iliac, right common femoral, proximal superficial femoral, and profundus femoral artery 2022   • Rupture of operation wound   • Wound dehiscence   • Moderate malnutrition (Formerly [facility])        Past Medical History:   Diagnosis Date   • Anxiety    • Arthritis    • Coronary artery disease     s/p stent in    • Depression    • Dyslipidemia    • GERD (gastroesophageal reflux disease)    • Hyperlipidemia    • Hypertension    • Myocardial infarction (Formerly [facility])     age 28   • Peripheral vascular disease ([facility]ly [facility])    • Wears dentures     UPPER PLATE    • Wears glasses         Past Surgical History:   Procedure Laterality Date   • AORTAGRAM N/A 3/8/2022    Procedure: AORTAGRAM WITH RUNOFFS;  Surgeon: Dallin Quinn MD;   Location:  [facility];  Service: Vascular;  Laterality: N/A;  FLURO-1 MIN 42 SEC    DOSE- 99.36mGy    VISI-60ML   • AORTAGRAM N/A 4/29/2022    Procedure: ARTERIOGRAM OF RIGHT FERMORAL ARTERY WITH RUNOFFS AND PROFUNDAPLASTY;  Surgeon: Dallin Quinn MD;  Location: [facility];  Service: Vascular;  Laterality: N/A;  FL:  Dose:  Contrast:     • CARDIAC CATHETERIZATION  2016    [place]   • CAROTID ENDARTERECTOMY Left 4/2/2019    Procedure: CAROTID ENDARTERECTOMY WITH EEG LEFT;  Surgeon: Dallin Quinn MD;  Location: [facility] OR;  Service: Vascular   • CORONARY STENT PLACEMENT  2016    LADx2,Circx1 [place]   • FEMORAL ENDARTERECTOMY N/A 4/29/2022    Procedure: FEMORAL ENDARTERECTOMY;  Surgeon: Dallin Quinn MD;  Location:  [facility];  Service: Vascular;  Laterality: N/A;  FL: 26 SEC  DOSE: 0.6 mGY      • KNEE SURGERY Left     fracture repair   • TUBAL ABDOMINAL LIGATION             Wound 05/23/22 2022 Right other (see comments) groin (Active)   Dressing Appearance dry;intact 06/01/22 1500   Closure Other (Comment) 06/01/22 0800   Base dressing in place, unable to visualize 06/01/22 0800   Periwound dry 06/01/22 0800   Wound Output (mL) 10 06/01/22 1500       NPWT (Negative Pressure Wound Therapy) 05/31/22 0900 R groin (Active)   Therapy Setting continuous therapy 06/01/22 1500   Pressure Setting 150 mmHg 06/01/22 1500         WOUND DEBRIDEMENT  Total area of Debridement: ~2cm2  Debridement Site 1  Location- Site 1: R groin  Selective Debridement- Site 1: Wound Surface <20cmsq  Instruments- Site 1: tweezers, scissors  Excised Tissue Description- Site 1: minimum, slough  Bleeding- Site 1: none               PT Assessment (last 12 hours)     PT Evaluation and Treatment     Row Name 06/01/22 1500          Physical Therapy Time and Intention    Subjective Information complains of;weakness;fatigue  -MF     Document Type therapy note (daily note);wound care  -MF     Mode of Treatment individual  therapy;physical therapy  -     Row Name 06/01/22 1500          Pain    Pretreatment Pain Rating 0/10 - no pain  -     Posttreatment Pain Rating 0/10 - no pain  -     Row Name 06/01/22 1500          Wound 05/23/22 2022 Right other (see comments) groin    Wound - Properties Group Placement Date: 05/23/22  - Placement Time: 2022  -MK Present on Hospital Admission: Y  -MK Side: Right  -MK Orientation: other (see comments)  -MK, groin  Location: groin  -MK     Dressing Appearance dry;intact  -MF     Wound Output (mL) 10  -MF     Retired Wound - Properties Group Placement Date: 05/23/22  -MK Placement Time: 2022  -MK Present on Hospital Admission: Y  -MK Side: Right  -MK Orientation: other (see comments)  -MK, groin  Location: groin  -MK     Retired Wound - Properties Group Date first assessed: 05/23/22  - Time first assessed: 2022  -MK Present on Hospital Admission: Y  -MK Side: Right  -MK Location: groin  -MK     Row Name 06/01/22 1500          NPWT (Negative Pressure Wound Therapy) 05/31/22 0900 R groin    NPWT (Negative Pressure Wound Therapy) - Properties Group Placement Date: 05/31/22  - Placement Time: 0900  -MF Location: R groin  -MF     Therapy Setting continuous therapy  converted to home unit  -     Pressure Setting 150 mmHg  -     Retired NPWT (Negative Pressure Wound Therapy) - Properties Group Placement Date: 05/31/22  - Placement Time: 0900  -MF Location: R groin  -MF     Retired NPWT (Negative Pressure Wound Therapy) - Properties Group Placement Date: 05/31/22  - Placement Time: 0900  -MF Location: R groin  -MF     Row Name 06/01/22 1500          Coping    Observed Emotional State calm;cooperative  -     Verbalized Emotional State acceptance  -     Trust Relationship/Rapport care explained  -     Row Name 06/01/22 1500          Plan of Care Review    Plan of Care Reviewed With patient  -     Outcome Evaluation PT converted pt to home wound vac to allow for transition home with  OP f/u at the wound care center. PT educated pt and family about home management of unit with canister changes and charging equipment as well as returning the unit once pt finished. Pt and family able to verb understanding.  -     Row Name 06/01/22 1500          Positioning and Restraints    Pre-Treatment Position in bed  -MF     Post Treatment Position bed  -MF     In Bed supine;call light within reach  -MF           User Key  (r) = Recorded By, (t) = Taken By, (c) = Cosigned By    Initials Name Provider Type    MF Terry Shelley, PT Physical Therapist    Tiny Martini, RN Registered Nurse              Physical Therapy Education                 Title: PT OT SLP Therapies (In Progress)     Topic: Physical Therapy (Done)     Point: Mobility training (Done)     Learning Progress Summary           Patient Acceptance, E, VU by CALLUM at 5/26/2022 1001                               User Key     Initials Effective Dates Name Provider Type Francheska     06/16/21 -  Kelsey Abreu PT Physical Therapist PT                Recommendation and Plan  Planned Therapy Interventions (PT): wound care, patient/family education  Therapy Frequency (PT): daily  Plan of Care Reviewed With: patient   Progress: improving       Progress: improving  Outcome Evaluation: PT converted pt to home wound vac to allow for transition home with OP f/u at the wound care center. PT educated pt and family about home management of unit with canister changes and charging equipment as well as returning the unit once pt finished. Pt and family able to verb understanding.  Plan of Care Reviewed With: patient            Time Calculation   PT Charges     Row Name 06/01/22 1500             Time Calculation    Start Time 1500  -MF      PT Goal Re-Cert Due Date 06/05/22  -              Untimed Charges    60802-Chn Pressure wound to 50 sqcm 45  -MF              Total Minutes    Untimed Charges Total Minutes 45  -MF       Total Minutes 45  -MF             User Key  (r) = Recorded By, (t) = Taken By, (c) = Cosigned By    Initials Name Provider Type     Terry Shelley, PT Physical Therapist                  Therapy Charges for Today     Code Description Service Date Service Provider Modifiers Qty    52149911595  PT NEG PRESS WOUND TO 50SQCM DME3 5/31/2022 Terry Shelley, PT GP 1    [id number]  PT NEG PRESS WOUND TO 50SQCM DME3 6/1/2022 Terry Shelley, PT GP 1    [id number]  PT THER SUPP EA 15 MIN 6/1/2022 Terry Shelley, PT GP 2            PT G-Codes  Outcome Measure Options: AM-PAC 6 Clicks Basic Mobility (PT)  AM-PAC 6 Clicks Score (PT): 24  AM-PAC 6 Clicks Score (OT): 24       Terry Shelley, PT  6/1/2022

## 2022-06-01 NOTE — PROGRESS NOTES
[facility] Progress Note        Antibiotics:  Anti-Infectives (From admission, onward)    Ordered     Dose/Rate Route Frequency Start Stop    05/31/22 0921  piperacillin-tazobactam (ZOSYN) 3.375 g in iso-osmotic dextrose 50 ml (premix)        Ordering Provider: Terry Thakkar MD    3.375 g  over 4 Hours Intravenous Every 8 Hours Scheduled 05/31/22 1015 06/03/22 1359    05/23/22 1510  piperacillin-tazobactam (ZOSYN) 3.375 g in iso-osmotic dextrose 50 ml (premix)        Ordering Provider: Angela Landon PharmD    3.375 g  over 4 Hours Intravenous Every 8 Hours 05/23/22 2200 05/30/22 1903    05/23/22 1510  piperacillin-tazobactam (ZOSYN) 3.375 g in iso-osmotic dextrose 50 ml (premix)        Ordering Provider: Angela Landon PharmD    3.375 g  over 30 Minutes Intravenous Once 05/23/22 1600 05/23/22 1813    05/23/22 1510  vancomycin (VANCOCIN) 1000 mg/200 mL dextrose 5% IVPB        Ordering Provider: Angela Landon PharmD    20 mg/kg × 45.1 kg  over 60 Minutes Intravenous Once 05/23/22 1600 05/23/22 1738          CC: right groin infection    HPI:    Patient is a 61 y.o.  Yr old female with history of peripheral vascular disease, surgery April 29 by Dr Quinnwith right femoral artery cutdown and extended endarterectomy of the distal right external iliac/right common femoral/proximal superficial femoral and profundus femoral artery.  Pericardial patch closure from the right external iliac including right common femoral onto superficial femoral artery; she was seen at [facility] approximately May 18 and given empiric Bactrim/Ceftin with right groin surgical site dehiscence; patient reports no cultures taken at that time.  She apparently contacted Dr Kimble's office on May 19 with separation at the surgical site; she apparently was offered a visit that day but she was not able to make it into the office to be seen that day.  Since then she reports increasing separation and malodorous drainage.     6/1/22  "improving per her; no new focal pain; less intense pain at her right groin surgical site which is constant, sharp, nonradiating, worse with palpation, generally better with pain meds and 1-3  out of 10 in severity.        No headache photophobia or neck stiffness.  No shortness of breath cough or hemoptysis.  No nausea vomiting diarrhea or abdominal pain.  No dysuria hematuria or pyuria.       ROS:      6/1/22 No f/c/s. No n/v/d. No rash. No new ADR to Abx.     PE:   /63 (BP Location: Left arm, Patient Position: Lying)   Pulse 73   Temp 98.1 °F (36.7 °C) (Oral)   Resp 17   Ht 160 cm (63\")   Wt 45.1 kg (99 lb 6.4 oz)   SpO2 95%   BMI 17.61 kg/m²       GENERAL:sleepy, in no acute distress.   HEENT: Normocephalic, atraumatic.  PERRL. EOMI. No conjunctival injection. No icterus. Oropharynx clear without evidence of thrush or exudate. No evidence of peridontal disease.    NECK: Supple without nuchal rigidity. No mass.   HEART: RRR; No murmur, rubs, gallops.   LUNGS: Clear to auscultation bilaterally without wheezing, rales, rhonchi. Normal respiratory effort. Nonlabored. No dullness.  ABDOMEN: Soft, nontender, nondistended. Positive bowel sounds. No rebound or guarding. NO mass or HSM.  EXT:  No cyanosis, clubbing or edema. No cord.    MSK: FROM without joint effusions noted arms/legs.    SKIN: Warm and dry without cutaneous eruptions on Inspection/palpation.  See below  NEURO: sleepy     Right groin surgical site with large area of dehiscence, no black tissue ,   no malodor after PT/wound care debridements.  less surrounding erythema ; no discrete mass bulge or fluctuance.  No crepitus or bulla.  I am unable to visualize any vascular structures and no prosthetic material visible.     No peripheral stigmata/phenomena of endocarditis     IV without obvious redness or drainage         Laboratory Data    Results from last 7 days   Lab Units 06/01/22  0428 05/31/22  0412 05/30/22  0615   WBC 10*3/mm3 8.91 7.95 " 8.43   HEMOGLOBIN g/dL 7.9* 8.5* 8.5*   HEMATOCRIT % 23.4* 25.6* 26.3*   PLATELETS 10*3/mm3 262 319 306     Results from last 7 days   Lab Units 05/26/22  0458   SODIUM mmol/L 136   POTASSIUM mmol/L 3.7   CHLORIDE mmol/L 103   CO2 mmol/L 22.0   BUN mg/dL 5*   CREATININE mg/dL 0.61   GLUCOSE mg/dL 91   CALCIUM mg/dL 8.8                   Estimated Creatinine Clearance: 69.8 mL/min (by C-G formula based on SCr of 0.61 mg/dL).      Microbiology:      Radiology:  Imaging Results (Last 72 Hours)     ** No results found for the last 72 hours. **            Impression:       --Acute right groin surgical site infection with deep wound, albeit unable to visualize any vascular structures at present.  Worse despite outpatient supportive measures and oral Bactrim/Ceftin prescribed at [facility] on May 18.  Patient understands high risk for further serious morbidity and other serious sequela including persistent/recurrent or nonhealing wounds, persistent/progressive or recurrent infection and risk to deeper tissue structures including vascular structures with risk for functional/limb loss, chronic pain etc.  Broad empiric IV antibiotics ongoing and surgical team following for any timing/option or threshold for surgical debridement/intervention etc.    **E Coli in culture;  No MDR GPC in culture so daptomycin stopped     --Peripheral vascular disease with revascularization April 29, 2022 as above     --COVID negative     PLAN:      -- IV  Zosyn     -- Check/review labs cultures and scans     -- Partial history per nursing staff     --Discussed with microbiology     -- Highly complex set of issues with high risk for further serious morbidity and other serious sequela     --d/w [facility] lab; no wound cultures there;  Blood cultures there negative per        -- PT/wound care team following    **I anticipate oral abx at discharge        Terry Thakkar MD  6/1/2022

## 2022-06-01 NOTE — DISCHARGE SUMMARY
[facility] Medicine Services  DISCHARGE SUMMARY    Patient Name: Shannon Chan  : 1960  MRN: [id number]    Date of Admission: 2022  2:08 PM  Date of Discharge:  2022  Primary Care Physician: Zachary Shepherd III, MD    Consults     Date and Time Order Name Status Description    2022 12:33 AM Inpatient Infectious Diseases Consult Completed           Hospital Course     Presenting Problem:   Wound dehiscence [T81.30XA]    Active Hospital Problems    Diagnosis  POA   • **Wound dehiscence [T81.30XA]  Yes   • Moderate malnutrition (HCC) [E44.0]  Yes   • S/P endarterectomy of right external iliac, right common femoral, proximal superficial femoral, and profundus femoral artery 2022 [Z98.890]  Not Applicable   • Extensive PAD (peripheral artery disease) (HCC) [I73.9]  Yes   • Carotid stenosis, left s/p left CEA  [I65.22]  Yes   • Essential hypertension [I10]  Yes      Resolved Hospital Problems   No resolved problems to display.          Hospital Course:  Shannon Chan is a 61 y.o. female with history of hypertension, alcohol use disorder, hyperlipidemia, coronary artery disease s/p stent, bilateral carotid stenosis s/p left carotid endarterectomy, chronic emphysema with ongoing tobacco abuse, history of PAD with ongoing claudication.  She is s/p extended endarterectomy of the distal right external iliac, right common femoral, proximal superficial femoral and profundofemoral arteries, by Dr. Quinn 2022.  Patient presents with wound dehiscence/infection.      Assessment and plan:  Right groin wound dehiscence/infection  Status post extended endarterectomy of the distal right external iliac, right common femoral, proximal superficial femoral and profundofemoral arteries on 2022   Wound culture positive for quinolone- and amoxicillin-resistant E. coli, currently on IV Zosyn per ID recommendations.  Appreciate the help from ID team. I discussed  discharge plan with Dr. Thakkar. He would like to transition her to cefdinir and flagyl and will see her in the clinic in 2 days to re-evaluate.   BC x 2 NGTD  Continue dressing changes with 1/4 Dakin's twice daily for 5 to 7 days per CTS  Wound vac has been placed. Plan for home with wound vac and follow up with wound care clinic in [place].  Discussed with Terry Shelley, PT this am. He is working on getting a home wound vac approved prior to discharge.      Coronary artery disease status post stent placement  Bilateral carotid artery stenosis status post left carotid endarterectomy  Dyslipidemia  Continue aspirin, Plavix  On anticoagulation with Xarelto  Continue statins     Essential hypertension  Controlled with Norvasc     GERD  Continue PPI         Discharge Follow Up Recommendations for outpatient labs/diagnostics:  Follow up with PCP, first available hospital follow up appt  Follow up with Dr. Cerda in 1-2 weeks  Follow up with Dr. Thakkar in 2 days.     Day of Discharge     HPI:     Upright in bed this morning. Feels okay today.  Reports that the burning pain from her wound vac has resolved and she is tolerating it well.  Eager to discharge home today.      Review of Systems  Gen- No fevers, chills  CV- No chest pain, palpitations  Resp- No cough, dyspnea  GI- No N/V/D, abd pain      Vital Signs:   Temp:  [97.9 °F (36.6 °C)-98.2 °F (36.8 °C)] 98.1 °F (36.7 °C)  Heart Rate:  [63-88] 88  Resp:  [17-18] 17  BP: ()/(51-63) 112/63      Physical Exam:  Constitutional: No acute distress, awake, alert, upright in bed, no family at bedside  HENT: NCAT, mucous membranes moist  Respiratory: Clear to auscultation bilaterally, respiratory effort normal  On room air  Cardiovascular: RRR, no murmurs, rubs, or gallops  Gastrointestinal: Positive bowel sounds, soft, nontender, nondistended  Musculoskeletal: No bilateral ankle edema  Psychiatric: Appropriate affect, cooperative  Neurologic: Oriented x 3, strength  symmetric in all extremities, Cranial Nerves grossly intact to confrontation, speech clear  Skin: No rashes noted to exposed skin, right groin wound vac intact.       Pertinent  and/or Most Recent Results     LAB RESULTS:      Lab 06/01/22  0428 05/31/22  0412 05/30/22  0615 05/29/22  0450 05/28/22  0412   WBC 8.91 7.95 8.43 7.87 8.11   HEMOGLOBIN 7.9* 8.5* 8.5* 8.3* 8.3*   HEMATOCRIT 23.4* 25.6* 26.3* 24.9* 24.6*   PLATELETS 262 319 306 340 330   MCV 90.0 89.2 95.3 90.5 89.8         Lab 05/26/22  0458   SODIUM 136   POTASSIUM 3.7   CHLORIDE 103   CO2 22.0   ANION GAP 11.0   BUN 5*   CREATININE 0.61   EGFR 102.5   GLUCOSE 91   CALCIUM 8.8                         Brief Urine Lab Results     None        Microbiology Results (last 10 days)     Procedure Component Value - Date/Time    Blood Culture - Blood, Arm, Right [[id number]]  (Normal) Collected: 05/23/22 1544    Lab Status: Final result Specimen: Blood from Arm, Right Updated: 05/28/22 1633     Blood Culture No growth at 5 days    Blood Culture - Blood, Arm, Left [[id number]]  (Normal) Collected: 05/23/22 1538    Lab Status: Final result Specimen: Blood from Arm, Left Updated: 05/28/22 1633     Blood Culture No growth at 5 days    Wound Culture - Wound, Groin [[id number]]  (Abnormal)  (Susceptibility) Collected: 05/23/22 1512    Lab Status: Final result Specimen: Wound from Groin Updated: 05/25/22 0822     Wound Culture Scant growth (1+) Escherichia coli     Gram Stain Moderate (3+) WBCs seen      No organisms seen    Susceptibility      Escherichia coli      ANDREW      Amikacin Susceptible      Ampicillin Resistant     Ampicillin + Sulbactam Intermediate      Cefepime Susceptible      Ceftazidime Susceptible      Ceftriaxone Susceptible      Gentamicin Resistant     Levofloxacin Resistant     Piperacillin + Tazobactam Susceptible      Tetracycline Resistant     Tobramycin Intermediate      Trimethoprim + Sulfamethoxazole Susceptible                        Susceptibility Comments     Escherichia coli    Cefazolin sensitivity will not be reported for Enterobacteriaceae in non-urine isolates. If cefazolin is preferred, please call the microbiology lab to request an E-test.  With the exception of urinary-sourced infections, aminoglycosides should not be used as monotherapy.             COVID PRE-OP / PRE-PROCEDURE SCREENING ORDER (NO ISOLATION) - Swab, Nasopharynx [[id number]]  (Normal) Collected: 05/23/22 1445    Lab Status: Final result Specimen: Swab from Nasopharynx Updated: 05/23/22 1943    Narrative:      The following orders were created for panel order COVID PRE-OP / PRE-PROCEDURE SCREENING ORDER (NO ISOLATION) - Swab, Nasopharynx.  Procedure                               Abnormality         Status                     ---------                               -----------         ------                     COVID-19, APTIMA PANTHER...[[id number]]  Normal              Final result                 Please view results for these tests on the individual orders.    COVID-19, APTIMA PANTHER [facility] IN-HOUSE NP/OP SWAB IN UTM/VTM/SALINE TRANSPORT MEDIA 24HR TAT - Swab, Nasopharynx [[id number]]  (Normal) Collected: 05/23/22 1445    Lab Status: Final result Specimen: Swab from Nasopharynx Updated: 05/23/22 1943     COVID19 Not Detected    Narrative:      Fact sheet for providers: https://www.fda.gov/media/210926/download     Fact sheet for patients: https://www.fda.gov/media/915627/download    Test performed by RT PCR.          No radiology results for the last 10 days    Results for orders placed during the hospital encounter of 04/12/22    Duplex Carotid Ultrasound CAR    Interpretation Summary  · Right internal carotid artery stenosis of 0-49%.  · Left internal carotid artery stenosis of 0-49%.      Results for orders placed during the hospital encounter of 04/12/22    Duplex Carotid Ultrasound CAR    Interpretation Summary  · Right internal carotid artery stenosis of 0-49%.  ·  Left internal carotid artery stenosis of 0-49%.          Plan for Follow-up of Pending Labs/Results:     Discharge Details        Discharge Medications      New Medications      Instructions Start Date   ALPRAZolam 0.5 MG tablet  Commonly known as: XANAX   0.5 mg, Oral, 2 Times Daily PRN      cefdinir 300 MG capsule  Commonly known as: OMNICEF   300 mg, Oral, 2 Times Daily      HYDROcodone-acetaminophen 5-325 MG per tablet  Commonly known as: NORCO   1 tablet, Oral, Every 4 Hours PRN      metroNIDAZOLE 500 MG tablet  Commonly known as: FLAGYL   500 mg, Oral, 3 Times Daily         Continue These Medications      Instructions Start Date   amLODIPine 10 MG tablet  Commonly known as: NORVASC   10 mg, Oral, Every Morning      aspirin 81 MG EC tablet   81 mg, Oral, Daily      pantoprazole 40 MG EC tablet  Commonly known as: PROTONIX   40 mg, Oral, Every Morning      rosuvastatin 20 MG tablet  Commonly known as: CRESTOR   20 mg, Oral, Every Morning      Xarelto 2.5 MG tablet  Generic drug: Rivaroxaban   2.5 mg, Oral, 2 Times Daily With Meals         Stop These Medications    cefuroxime 500 MG tablet  Commonly known as: CEFTIN     sulfamethoxazole-trimethoprim 800-160 MG per tablet  Commonly known as: BACTRIM DS,SEPTRA DS            Allergies   Allergen Reactions   • Percocet [Oxycodone-Acetaminophen] Nausea And Vomiting     N/V   • Lortab [Hydrocodone-Acetaminophen] Nausea And Vomiting   • Darvon [Propoxyphene] Nausea And Vomiting     Darvocet         Discharge Disposition:  Home or Self Care    Diet:  Hospital:  Diet Order   Procedures   • Diet Regular; Consistent Carbohydrate       Activity:  Activity Instructions     Activity as Tolerated               CODE STATUS:    Code Status and Medical Interventions:   Ordered at: 05/23/22 1501     Level Of Support Discussed With:    Patient     Code Status (Patient has no pulse and is not breathing):    CPR (Attempt to Resuscitate)     Medical Interventions (Patient has pulse or  is breathing):    Full Support       Future Appointments   Date Time Provider Department Center   4/6/2023 12:30 PM Kelle Hogan APRN [facility]       Additional Instructions for the Follow-ups that You Need to Schedule     Discharge Follow-up with PCP   As directed       Currently Documented PCP:    Zachary Shepherd III, MD    PCP Phone Number:    [phone]     Follow Up Details: first available hospital follow up appt         Discharge Follow-up with Specified Provider: Dr. Cerda   As directed      To: Dr. Cerda    Follow Up Details: 1-2 weeks         Discharge Follow-up with Specified Provider: Dr. Thakkar   As directed      To: Dr. Thakkar    Follow Up Details: 2 days                     DENIS Montana  06/01/22      Time Spent on Discharge:  I spent  45  minutes on this discharge activity which included: face-to-face encounter with the patient, reviewing the data in the system, coordination of the care with the nursing staff as well as consultants, documentation, and entering orders.

## 2022-06-01 NOTE — CASE MANAGEMENT/SOCIAL WORK
Case Management Discharge Note      Final Note: Spoke with patient at bedside.  Patient denied any needs prior to discharge.  Follow up appointment has been arranged with Dr. Thakkar for Friday June 3rd at 9AM.  A follow up appointment has been made with the [facility] in [place] for Thursday, June 2nd at 10:30 AM.  Both appointments have been entered into Epic and should print on patient's discharge paperwork.  Patient also mentioned meals of wheels.  Have spoken with TAB Fonseca, who states that she will follow up with patient tomorrow.  There are no other needs at this time.         Selected Continued Care - Admitted Since 5/23/2022     Destination    No services have been selected for the patient.              Durable Medical Equipment    No services have been selected for the patient.              Dialysis/Infusion    No services have been selected for the patient.              Home Medical Care    No services have been selected for the patient.              Therapy    No services have been selected for the patient.              Community Resources    No services have been selected for the patient.              Community & DME    No services have been selected for the patient.                       Final Discharge Disposition Code: 01 - home or self-care

## 2022-06-01 NOTE — PROGRESS NOTES
[facility] Cardiothoracic Surgery In-Patient Progress Note       LOS: 9 days       Subjective  No complaints, wanting to go home. Vitals stable, on room air.    Objective  Vital Signs  Temp:  [97.9 °F (36.6 °C)-98.2 °F (36.8 °C)] 98.2 °F (36.8 °C)  Heart Rate:  [63-85] 69  Resp:  [18] 18  BP: ()/(51-63) 109/63      Physical Exam:   General Appearance: alert, appears stated age and cooperative   Lungs: respirations regular, respirations even and respirations unlabored   Heart: normal S1, S2, no murmur, no gallop, no rub and no click   Skin: right groin wound vac in place   Ext: Warm and viable; no edema     Results     Results from last 7 days   Lab Units 06/01/22  0428   WBC 10*3/mm3 8.91   HEMOGLOBIN g/dL 7.9*   HEMATOCRIT % 23.4*   PLATELETS 10*3/mm3 262     Results from last 7 days   Lab Units 05/26/22  0458   SODIUM mmol/L 136   POTASSIUM mmol/L 3.7   CHLORIDE mmol/L 103   CO2 mmol/L 22.0   BUN mg/dL 5*   CREATININE mg/dL 0.61   GLUCOSE mg/dL 91   CALCIUM mg/dL 8.8     Imaging Results (Last 24 Hours)     ** No results found for the last 24 hours. **          Assessment    Wound dehiscence    Essential hypertension    Carotid stenosis, left s/p left CEA 2019    Extensive PAD (peripheral artery disease) (HCC)    S/P endarterectomy of right external iliac, right common femoral, proximal superficial femoral, and profundus femoral artery 4/29/2022    Moderate malnutrition (HCC)      Plan   Ok to be discharged from a surgery standpoint  Case management working on outpatient wound therapy    Maryanne Roldan, DENIS  06/01/22  07:22 EDT

## 2022-06-02 ENCOUNTER — READMISSION MANAGEMENT (OUTPATIENT)
Dept: CALL CENTER | Facility: HOSPITAL | Age: 62
End: 2022-06-02

## 2022-06-02 NOTE — OUTREACH NOTE
Prep Survey    Flowsheet Row Responses   [facility] facility patient discharged from? [facility]   Is LACE score < 7 ? No   Emergency Room discharge w/ pulse ox? No   Eligibility Readm Mgmt   Discharge diagnosis **Wound dehiscence Moderate malnutrition    Does the patient have one of the following disease processes/diagnoses(primary or secondary)? Other   Does the patient have Home health ordered? No   Is there a DME ordered? No   Comments regarding appointments [facility]   Prep survey completed? Yes          TELLY GOLDBERG - Registered Nurse

## 2022-06-03 ENCOUNTER — DOCUMENTATION (OUTPATIENT)
Dept: SOCIAL WORK | Facility: HOSPITAL | Age: 62
End: 2022-06-03

## 2022-06-03 NOTE — CASE MANAGEMENT/SOCIAL WORK
Continued Stay Note       Patient Name: Shannon Chan  MRN: [id number]  Today's Date: 6/3/2022    Admit Date: (Not on file)     Discharge Plan     Row Name 06/03/22 1349       Plan    Plan Referral made to [facility] in [place] to the cordinator Brigitte, [phone] freida d Brigitte will contact the patient next week.               Discharge Codes    No documentation.                     JAMES Bansal

## 2022-06-06 ENCOUNTER — TELEPHONE (OUTPATIENT)
Dept: CARDIAC SURGERY | Facility: CLINIC | Age: 62
End: 2022-06-06

## 2022-06-06 NOTE — TELEPHONE ENCOUNTER
Caller: Shannon Chan    Relationship to patient: Self    Best call back number: [phone]    Chief complaint: PT CALLING IN REGARDING GETTING APPT SET UP WITH DR MCCRARY. PT STATED HER HOSPITAL PAPERS STATE SHE NEEDS AN APPT WITH DR MCCRARY. PT IS EST WITH DR ALBARRAN & IS CONFUSED ON WHAT THIS APPT WOULD BE FOR/ IF IT IS NEEDED. PT HAS [facility] POST OP APPT ON 6/27. PLEASE CALL BACK PT FOR CLARIFICATION/ SCHEDULING IF NECESSARY.     Type of visit: FOLLOW UP (THOUGH SHE WOULD BE NEW TO DR MCCRARY)       Additional notes: PLEASE CALL PT FOR CLARIFICATION.

## 2022-06-07 ENCOUNTER — READMISSION MANAGEMENT (OUTPATIENT)
Dept: CALL CENTER | Facility: HOSPITAL | Age: 62
End: 2022-06-07

## 2022-06-07 NOTE — OUTREACH NOTE
Medical Week 1 Survey    Flowsheet Row Responses   [facility] patient discharged from? [place]   Does the patient have one of the following disease processes/diagnoses(primary or secondary)? Other   Week 1 attempt successful? Yes   Call start time 0852   Call end time 0854   Discharge diagnosis **Wound dehiscence Moderate malnutrition    Meds reviewed with patient/caregiver? Yes   Is the patient having any side effects they believe may be caused by any medication additions or changes? No   Does the patient have all medications ordered at discharge? Yes   Is the patient taking all medications as directed (includes completed medication regime)? Yes   Does the patient have a primary care provider?  Yes   Comments regarding PCP PATIENT STATES SHE HAD A PHONE APPOINTMENT WITH DR. REAL   Has the patient kept scheduled appointments due by today? Yes   Has home health visited the patient within 72 hours of discharge? N/A   Psychosocial issues? No   Did the patient receive a copy of their discharge instructions? Yes   Nursing interventions Reviewed instructions with patient   What is the patient's perception of their health status since discharge? Improving   Is the patient/caregiver able to teach back signs and symptoms related to disease process for when to call PCP? Yes   Is the patient/caregiver able to teach back signs and symptoms related to disease process for when to call 911? Yes   Is the patient/caregiver able to teach back the hierarchy of who to call/visit for symptoms/problems? PCP, Specialist, Home health nurse, Urgent Care, ED, 911 Yes   If the patient is a current smoker, are they able to teach back resources for cessation? [phone]   Week 1 call completed? Yes          JAKE SORENSEN - Licensed Nurse

## 2022-06-16 DIAGNOSIS — I73.9 PAD (PERIPHERAL ARTERY DISEASE): Primary | ICD-10-CM

## 2022-06-27 ENCOUNTER — OFFICE VISIT (OUTPATIENT)
Dept: CARDIAC SURGERY | Facility: CLINIC | Age: 62
End: 2022-06-27

## 2022-06-27 VITALS
HEIGHT: 63 IN | DIASTOLIC BLOOD PRESSURE: 68 MMHG | SYSTOLIC BLOOD PRESSURE: 110 MMHG | BODY MASS INDEX: 18.5 KG/M2 | WEIGHT: 104.4 LBS

## 2022-06-27 DIAGNOSIS — Z98.890 H/O ENDARTERECTOMY: Primary | ICD-10-CM

## 2022-06-27 PROCEDURE — 99024 POSTOP FOLLOW-UP VISIT: CPT | Performed by: NURSE PRACTITIONER

## 2022-06-27 NOTE — PROGRESS NOTES
[facility] Cardiothoracic Surgery Office Follow Up Note     Date of Encounter: 06/27/2022     YOB: 1960  Name: Shannon Chan    PCP: Zachary Shepherd III, MD    Chief Complaint:    Chief Complaint   Patient presents with   • Peripheral Vascular Disease     Hospital follow up for wound infection - right femoral, right iliac endarterectomy on 4/29/22       History of Present Illness:      Shannon Chan is a 61 y.o. female with a history of hypertension, hyperlipidemia, ongoing tobacco use, EtOH use CAD s/p stenting, bilateral carotid stenosis (50% on right preoperatively) s/p left carotid endarterectomy on 4/2/2019 with Dr. Quinn and PVD s/p right femoral artery cutdown with extended endarterectomy of the distal right external iliac, right common femoral, proximal SFA and profundus femoral artery with pericardial patch closure for the right external iliac artery into the right common femoral onto the SFA 4/29/2022 with Dr. Quinn.  Presents today for hospital follow-up.  Last seen in the office on 5/23/2022 with dehiscence of her right groin incision following delayed in office evaluation due to transportation issues with [facility] ER visit with failed outpatient antibiotic therapy and admission to hospital with IV antibiotics/wound care.  Patient was seen during hospitalization by Dr. Bijan CORRAL with right groin growing E. Coli.  She has completed antibiotic therapy and seeing him now on an as-needed basis.  She is continuing to follow with [facility] wound care and wound VAC has been discontinued.  Pt is continuing to smoke.  He has had resolution of her right lower extremity claudication symptoms.  She does have some occasional numbness in her right thigh area.    Review of Systems:  Review of Systems   Constitutional: Negative for chills, decreased appetite, diaphoresis, fever, malaise/fatigue, night sweats and weight loss.   HENT: Negative for congestion, hoarse voice, sore  throat and stridor.    Cardiovascular: Negative for chest pain, claudication, dyspnea on exertion, irregular heartbeat, leg swelling, near-syncope, orthopnea, palpitations, paroxysmal nocturnal dyspnea and syncope.   Respiratory: Negative for cough, hemoptysis, shortness of breath, sleep disturbances due to breathing, snoring, sputum production and wheezing.    Hematologic/Lymphatic: Negative for adenopathy and bleeding problem. Does not bruise/bleed easily.   Skin: Negative for color change, dry skin, itching, poor wound healing and rash.   Musculoskeletal: Negative for arthritis, back pain, falls and muscle weakness.   Gastrointestinal: Negative for abdominal pain, anorexia, constipation, diarrhea, hematochezia, melena, nausea and vomiting.   Neurological: Negative for difficulty with concentration, disturbances in coordination, dizziness, loss of balance, numbness, seizures, vertigo and weakness.   Psychiatric/Behavioral: Negative for altered mental status, depression, memory loss and substance abuse. The patient does not have insomnia and is not nervous/anxious.    Allergic/Immunologic: Negative for persistent infections.       Allergies:  Allergies   Allergen Reactions   • Percocet [Oxycodone-Acetaminophen] Nausea And Vomiting     N/V   • Lortab [Hydrocodone-Acetaminophen] Nausea And Vomiting   • Darvon [Propoxyphene] Nausea And Vomiting     Darvocet       Medications:      Current Outpatient Medications:   •  ALPRAZolam (XANAX) 0.5 MG tablet, Take 1 tablet by mouth 2 (Two) Times a Day As Needed for Anxiety., Disp: 6 tablet, Rfl: 0  •  amLODIPine (NORVASC) 10 MG tablet, Take 10 mg by mouth Every Morning., Disp: , Rfl: 3  •  aspirin 81 MG EC tablet, Take 81 mg by mouth Daily., Disp: , Rfl: 3  •  cefdinir (OMNICEF) 300 MG capsule, Take 1 capsule by mouth 2 (Two) Times a Day., Disp: 14 capsule, Rfl: 0  •  HYDROcodone-acetaminophen (NORCO) 5-325 MG per tablet, Take 1 tablet by mouth Every 4 (Four) Hours As Needed  for Moderate Pain ., Disp: 18 tablet, Rfl: 0  •  pantoprazole (PROTONIX) 40 MG EC tablet, Take 40 mg by mouth Every Morning., Disp: , Rfl:   •  Rivaroxaban (XARELTO) 2.5 MG tablet, Take 1 tablet by mouth 2 (Two) Times a Day With Meals. Indications: Post Surgical - Other, Disp: 60 tablet, Rfl: 5  •  rosuvastatin (CRESTOR) 20 MG tablet, Take 20 mg by mouth Every Morning., Disp: , Rfl: 3    Social History     Socioeconomic History   • Marital status: Single   • Number of children: 0   Tobacco Use   • Smoking status: Current Every Day Smoker     Packs/day: 0.50     Years: 46.00     Pack years: 23.00     Types: Cigarettes   • Smokeless tobacco: Never Used   • Tobacco comment: started smoking at age 12   Vaping Use   • Vaping Use: Never used   Substance and Sexual Activity   • Alcohol use: Yes     Alcohol/week: 21.0 standard drinks     Types: 21 Cans of beer per week   • Drug use: No   • Sexual activity: Defer       Family History   Problem Relation Age of Onset   • Other Mother         brain tumor   • Alcohol abuse Father    • Cirrhosis Father        Past Medical History:   Diagnosis Date   • Anxiety    • Arthritis    • Coronary artery disease     s/p stent in 2016   • Depression    • Dyslipidemia    • GERD (gastroesophageal reflux disease)    • Hyperlipidemia    • Hypertension    • Myocardial infarction (HCC)     age 28   • Peripheral vascular disease (HCC)    • Wears dentures     UPPER PLATE    • Wears glasses        Past Surgical History:   Procedure Laterality Date   • AORTAGRAM N/A 3/8/2022    Procedure: AORTAGRAM WITH RUNOFFS;  Surgeon: Dallin Quinn MD;  Location: [facility];  Service: Vascular;  Laterality: N/A;  FLURO-1 MIN 42 SEC    DOSE- 99.36mGy    VISI-60ML   • AORTAGRAM N/A 4/29/2022    Procedure: ARTERIOGRAM OF RIGHT FERMORAL ARTERY WITH RUNOFFS AND PROFUNDAPLASTY;  Surgeon: Dallin Quinn MD;  Location: [facility];  Service: Vascular;  Laterality: N/A;  FL:  Dose:  Contrast:     •  "CARDIAC CATHETERIZATION  2016    [place]   • CAROTID ENDARTERECTOMY Left 4/2/2019    Procedure: CAROTID ENDARTERECTOMY WITH EEG LEFT;  Surgeon: Dallin Quinn MD;  Location: [facility] OR;  Service: Vascular   • CORONARY STENT PLACEMENT  2016    LADx2,Circx1 [place]   • FEMORAL ENDARTERECTOMY N/A 4/29/2022    Procedure: FEMORAL ENDARTERECTOMY;  Surgeon: Dallin Quinn MD;  Location: [facility];  Service: Vascular;  Laterality: N/A;  FL: 26 SEC  DOSE: 0.6 mGY      • KNEE SURGERY Left     fracture repair   • TUBAL ABDOMINAL LIGATION         I have reviewed the following portions of the patient's history: allergies, current medications, past family history, past medical history, past social history, past surgical history and problem list and confirm it's accurate.    Physical Exam:  Vital Signs:    Vitals:    06/27/22 0942   BP: 110/68   BP Location: Right arm   Patient Position: Sitting   Weight: 47.4 kg (104 lb 6.4 oz)   Height: 160 cm (63\")     Body mass index is 18.49 kg/m².     Physical Exam  Vitals and nursing note reviewed.   Constitutional:       Appearance: Normal appearance. She is well-developed and well-groomed.   HENT:      Head: Normocephalic and atraumatic.   Cardiovascular:      Rate and Rhythm: Normal rate and regular rhythm.      Pulses:           Dorsalis pedis pulses are 2+ on the right side.        Posterior tibial pulses are 1+ on the right side.   Pulmonary:      Comments: Unlabored  Musculoskeletal:      Cervical back: Neck supple.      Right lower leg: No edema.      Left lower leg: No edema.   Skin:     General: Skin is warm and dry.      Comments: Incisions:  Healing right groin with dehiscence and noted pink granulous tissue.     No drainage, surrounding erythema/warmth, hematoma or induration   Neurological:      Mental Status: She is alert and oriented to person, place, and time.   Psychiatric:         Attention and Perception: Attention normal.         Mood and Affect: Mood " normal.         Speech: Speech normal.         Behavior: Behavior is cooperative.             Labs/Imaging:    No radiology results for the last 30 days.     Results for orders placed during the hospital encounter of 04/12/22    Duplex Carotid Ultrasound CAR    Interpretation Summary  · Right internal carotid artery stenosis of 0-49%.  · Left internal carotid artery stenosis of 0-49%.      No results found for this or any previous visit.    Assessment / Plan:  Diagnoses and all orders for this visit:    1. S/P endarterectomy of right external iliac, right common femoral, proximal superficial femoral, and profundus femoral artery 4/29/2022 (Primary)     Shannon Chan is a 61 y.o. female with a history of hypertension, hyperlipidemia, ongoing tobacco use, EtOH use CAD s/p stenting, bilateral carotid stenosis (50% on right preoperatively) s/p left carotid endarterectomy on 4/2/2019 with Dr. Quinn and PVD s/p right femoral artery cutdown with extended endarterectomy of the distal right external iliac, right common femoral, proximal SFA and profundus femoral artery with pericardial patch closure for the right external iliac artery into the right common femoral onto the SFA 4/29/2022 with Dr. Quinn.  Right groin is continuing to heal. She has been using collagen per wound care at [place] and I have discussed with pt that Dr. Quinn recommends transition from Dakin's to saline wet-to-dry dressing changes now with removal of wound VAC.  She verbalizes understanding.  Unfortunately, patient is continuing to smoke and I have counseled her on the importance of cessation in regards to her PVD/wound healing.  We will plan to follow-up in 4 weeks for wound check.  She is to contact our office with any new drainage, erythema, warmth, fevers or chills.    Kelle Hogan APRNATALIE  [facility] Cardiothoracic Surgery

## 2022-07-22 ENCOUNTER — TELEPHONE (OUTPATIENT)
Dept: CARDIAC SURGERY | Facility: CLINIC | Age: 62
End: 2022-07-22

## 2022-07-22 NOTE — TELEPHONE ENCOUNTER
"Shannon called this morning to CX her apt w/ our APRN, Kelle, on 07/25. She stated that \"Her wound is fine, she does not have a car and is tired of paying someone to bring her here just to get looked at.\" I let her know I would CX that apt and I suggested she follow up w/ her PCP to at least have someone check on the wound  or call our office if any concerns. She understood.   "

## 2023-04-26 DIAGNOSIS — I65.22 CAROTID STENOSIS, LEFT: ICD-10-CM

## 2023-04-26 DIAGNOSIS — I65.21 CAROTID STENOSIS, ASYMPTOMATIC, RIGHT: Primary | ICD-10-CM

## 2023-06-06 DIAGNOSIS — I65.21 CAROTID STENOSIS, ASYMPTOMATIC, RIGHT: ICD-10-CM

## 2023-06-06 DIAGNOSIS — I65.22 CAROTID STENOSIS, LEFT: ICD-10-CM

## 2023-06-09 DIAGNOSIS — I65.21 CAROTID STENOSIS, ASYMPTOMATIC, RIGHT: ICD-10-CM

## 2023-06-09 DIAGNOSIS — R42 DIZZINESS: Primary | ICD-10-CM

## 2024-04-15 ENCOUNTER — PREP FOR SURGERY (OUTPATIENT)
Dept: OTHER | Facility: HOSPITAL | Age: 64
End: 2024-04-15
Payer: MEDICARE

## 2024-04-15 ENCOUNTER — OFFICE VISIT (OUTPATIENT)
Dept: CARDIAC SURGERY | Facility: CLINIC | Age: 64
End: 2024-04-15
Payer: MEDICARE

## 2024-04-15 VITALS
BODY MASS INDEX: 17.72 KG/M2 | TEMPERATURE: 97.3 F | SYSTOLIC BLOOD PRESSURE: 190 MMHG | HEART RATE: 117 BPM | WEIGHT: 100 LBS | OXYGEN SATURATION: 98 % | DIASTOLIC BLOOD PRESSURE: 80 MMHG | HEIGHT: 63 IN

## 2024-04-15 DIAGNOSIS — I65.21 CAROTID STENOSIS, ASYMPTOMATIC, RIGHT: Primary | ICD-10-CM

## 2024-04-15 DIAGNOSIS — I65.21 STENOSIS OF RIGHT CAROTID ARTERY: Primary | ICD-10-CM

## 2024-04-15 NOTE — PROGRESS NOTES
04/15/2024  Patient Information  Shannon Desai Day                                                                                          [address]    [place] [address]   1960  'PCP/Referring Physician'  Zachary Shepherd III, MD  [phone]  Keyshawn Bronson MD  [phone]  Chief Complaint   Patient presents with    Consult     Former Patient Ref By Dr. Bronson for Right Carotid Stenosis       History of Present Illness:   The patient is a 63-year-old female who is well known to me from her previous left carotid endarterectomy performed in 2019 and previous femoral endarterectomy after that.  This time she is referred to me witha CT angiogram demonstrating over 80 to 85% right internal carotid artery stenosis.  Despite all of this she has continued smoking.  At this time she denies stroke, seizures or TIAs.  She also denies amaurosis fugax.  I have reviewed her CT angiogram of the carotids and I have notr reviewed any other studies.      Patient Active Problem List   Diagnosis    Essential hypertension    CAD without angina pectoris with previous stents    Dyslipidemia    Mucopurulent chronic bronchitis    Carotid stenosis, left s/p left CEA 2019    PAD (peripheral artery disease)    Carotid stenosis, asymptomatic, right    Alcohol use    Centrilobular emphysema    Superior mesenteric artery stenosis    Femoral artery occlusion, right    S/P endarterectomy of right external iliac, right common femoral, proximal superficial femoral, and profundus femoral artery 4/29/2022    Rupture of operation wound    Wound dehiscence    Moderate malnutrition     Past Medical History:   Diagnosis Date    Anxiety     Arthritis     Coronary artery disease     s/p stent in 2016    Depression     Dyslipidemia     Dysphagia     GERD (gastroesophageal reflux disease)     Hyperlipidemia     Hypertension     Myocardial infarction     age 28    Peripheral vascular disease     Wears dentures     UPPER PLATE     Wears  glasses      Past Surgical History:   Procedure Laterality Date    AORTAGRAM N/A 3/8/2022    Procedure: AORTAGRAM WITH RUNOFFS;  Surgeon: Dallin Quinn MD;  [facility];  Service: Vascular;  Laterality: N/A;  FLURO-1 MIN 42 SEC    DOSE- 99.36mGy    VISI-60ML    AORTAGRAM N/A 4/29/2022    Procedure: ARTERIOGRAM OF RIGHT FERMORAL ARTERY WITH RUNOFFS AND PROFUNDAPLASTY;  Surgeon: Dallin Quinn MD;  Location: [facility];  Service: Vascular;  Laterality: N/A;  FL:  Dose:  Contrast:      CARDIAC CATHETERIZATION  2016    [place]    CAROTID ENDARTERECTOMY Left 4/2/2019    Procedure: CAROTID ENDARTERECTOMY WITH EEG LEFT;  Surgeon: Dallin Quinn MD;  Location: [facility];  Service: Vascular    CORONARY STENT PLACEMENT  2016    LADx2,Circx1 [place]    FEMORAL ENDARTERECTOMY N/A 4/29/2022    Procedure: FEMORAL ENDARTERECTOMY;  Surgeon: Dallin Quinn MD;  Location: [facility];  Service: Vascular;  Laterality: N/A;  FL: 26 SEC  DOSE: 0.6 mGY       KNEE SURGERY Left     fracture repair    TUBAL ABDOMINAL LIGATION         Current Outpatient Medications:     amLODIPine (NORVASC) 10 MG tablet, Take 1 tablet by mouth Every Morning., Disp: , Rfl: 3    aspirin 81 MG EC tablet, Take 1 tablet by mouth Daily., Disp: , Rfl: 3    pantoprazole (PROTONIX) 40 MG EC tablet, Take 1 tablet by mouth Every Morning., Disp: , Rfl:     Rivaroxaban (XARELTO) 2.5 MG tablet, Take 1 tablet by mouth 2 (Two) Times a Day With Meals. Indications: Post Surgical - Other, Disp: 60 tablet, Rfl: 5    rosuvastatin (CRESTOR) 20 MG tablet, Take 1 tablet by mouth Every Morning., Disp: , Rfl: 3    ALPRAZolam (XANAX) 0.5 MG tablet, Take 1 tablet by mouth 2 (Two) Times a Day As Needed for Anxiety. (Patient not taking: Reported on 4/15/2024), Disp: 6 tablet, Rfl: 0    cefdinir (OMNICEF) 300 MG capsule, Take 1 capsule by mouth 2 (Two) Times a Day. (Patient not taking: Reported on 4/15/2024), Disp: 14 capsule, Rfl: 0     HYDROcodone-acetaminophen (NORCO) 5-325 MG per tablet, Take 1 tablet by mouth Every 4 (Four) Hours As Needed for Moderate Pain . (Patient not taking: Reported on 4/15/2024), Disp: 18 tablet, Rfl: 0  Allergies   Allergen Reactions    Percocet [Oxycodone-Acetaminophen] Nausea And Vomiting     N/V    Lortab [Hydrocodone-Acetaminophen] Nausea And Vomiting    Darvon [Propoxyphene] Nausea And Vomiting     Darvocet     Social History     Socioeconomic History    Marital status: Single    Number of children: 0   Tobacco Use    Smoking status: Every Day     Current packs/day: 0.50     Average packs/day: 0.5 packs/day for 46.0 years (23.0 ttl pk-yrs)     Types: Cigarettes    Smokeless tobacco: Never    Tobacco comments:     started smoking at age 12   Vaping Use    Vaping status: Never Used   Substance and Sexual Activity    Alcohol use: Yes     Alcohol/week: 21.0 standard drinks of alcohol     Types: 21 Cans of beer per week    Drug use: No    Sexual activity: Defer     Family History   Problem Relation Age of Onset    Other Mother         brain tumor    Alcohol abuse Father     Cirrhosis Father      Review of Systems   Constitutional: Positive for weight loss. Negative for chills, fever, malaise/fatigue and night sweats.   HENT:  Negative for hearing loss, odynophagia and sore throat.    Cardiovascular:  Negative for chest pain, dyspnea on exertion, leg swelling, orthopnea and palpitations.   Respiratory:  Positive for cough. Negative for hemoptysis.    Endocrine: Negative for cold intolerance, heat intolerance, polydipsia, polyphagia and polyuria.   Hematologic/Lymphatic: Bruises/bleeds easily.   Skin:  Negative for itching and rash.   Musculoskeletal:  Positive for arthritis, back pain and joint pain (left knee). Negative for joint swelling and myalgias.   Gastrointestinal:  Positive for dysphagia. Negative for abdominal pain, constipation, diarrhea, hematemesis, hematochezia, melena, nausea and vomiting.   Genitourinary:  " Negative for dysuria, frequency and hematuria.   Neurological:  Negative for focal weakness, headaches, numbness and seizures.   Psychiatric/Behavioral:  Negative for suicidal ideas.    All other systems reviewed and are negative.    Vitals:    04/15/24 0709   BP: (!) 190/80   BP Location: Left arm   Patient Position: Sitting   Pulse: 117   Temp: 97.3 °F (36.3 °C)   SpO2: 98%   Weight: 45.4 kg (100 lb)   Height: 160 cm (63\")      Physical Exam   CONSTITUTIONAL: Alert and conversant, Well dressed, Well nourished, No acute distress  EYES: Sclera clean, Anicteric, Pupils equal  ENT: No nasal deviation, Trachea midline  NECK: No neck masses, Supple  LUNGS: No wheezing, Cough, non-congested  HEART: No rubs, No murmurs  GASTROINTESTINAL: Soft, non-distended, No masses, Non tender  to palpation, normal bowel sounds  NEURO: No motor deficits, No sensory deficits, Cranial Nerves 2 through 12 grossly intact  PSYCHIATRIC: Oriented to person, place and time, No memory deficits, Mood appropriate  VASCULAR: No carotid bruits, Femoral pulses palpable and symmetric  MUSKULOSKELETAL: No extremity trauma or extremity asymmetry    The ROS, past medical history, surgical history, family history, social history, and vitals were reviewed by myself and corrected as needed.      Labs/Imaging:  I have reviewed the carotid CT scan confirming significant right internal carotid artery stenosis by imaging.  I have reviewed no other scans but that one.  She has no plans to discontinue smoking even though this was discussed. The patient does not have an advance directive on file at this time.     Assessment/Plan:   The patient is a 63-year-old female who is status post left carotid endarterectomy.  The patient has been referred to me to evaluate for a right sided carotid endarterectomy.  I have reviewed those scans.  She is agreeable to a right carotid endarterectomy she is on Xarelto and we will try to proceed as soon as possible .She is aware " that carotid surgery has risks of stroke, bleeding, infection and death and although she had a satisfactory result last time, that in no way guarantees a satisfactory result this time.    Patient Active Problem List   Diagnosis    Essential hypertension    CAD without angina pectoris with previous stents    Dyslipidemia    Mucopurulent chronic bronchitis    Carotid stenosis, left s/p left CEA 2019    PAD (peripheral artery disease)    Carotid stenosis, asymptomatic, right    Alcohol use    Centrilobular emphysema    Superior mesenteric artery stenosis    Femoral artery occlusion, right    S/P endarterectomy of right external iliac, right common femoral, proximal superficial femoral, and profundus femoral artery 4/29/2022    Rupture of operation wound    Wound dehiscence    Moderate malnutrition         CC: MD Keyshawn Chambers MD Regina Fugate editing for Dallin Quinn MD       I, Dallin Quinn MD, have read and agree with the editing done by Makayla Fernandez, .

## 2024-04-16 ENCOUNTER — HOSPITAL ENCOUNTER (OUTPATIENT)
Dept: GENERAL RADIOLOGY | Facility: HOSPITAL | Age: 64
Discharge: HOME | End: 2024-04-16
Payer: MEDICARE

## 2024-04-16 ENCOUNTER — PRE-ADMISSION TESTING (OUTPATIENT)
Dept: PREADMISSION TESTING | Facility: HOSPITAL | Age: 64
DRG: 038 | End: 2024-04-16
Payer: MEDICARE

## 2024-04-16 VITALS — BODY MASS INDEX: 17.36 KG/M2 | WEIGHT: 97.99 LBS | HEIGHT: 63 IN

## 2024-04-16 DIAGNOSIS — I65.21 CAROTID STENOSIS, ASYMPTOMATIC, RIGHT: ICD-10-CM

## 2024-04-16 LAB
ABO GROUP BLD: NORMAL
ANION GAP SERPL CALCULATED.3IONS-SCNC: 14 MMOL/L (ref 5–15)
BLD GP AB SCN SERPL QL: NEGATIVE
BUN SERPL-MCNC: 10 MG/DL (ref 8–23)
BUN/CREAT SERPL: 12 (ref 7–25)
CALCIUM SPEC-SCNC: 10.3 MG/DL (ref 8.6–10.5)
CHLORIDE SERPL-SCNC: 97 MMOL/L (ref 98–107)
CO2 SERPL-SCNC: 24 MMOL/L (ref 22–29)
CREAT SERPL-MCNC: 0.83 MG/DL (ref 0.57–1)
DEPRECATED RDW RBC AUTO: 42.1 FL (ref 37–54)
EGFRCR SERPLBLD CKD-EPI 2021: 79.8 ML/MIN/1.73
ERYTHROCYTE [DISTWIDTH] IN BLOOD BY AUTOMATED COUNT: 12.9 % (ref 12.3–15.4)
GLUCOSE SERPL-MCNC: 109 MG/DL (ref 65–99)
HBA1C MFR BLD: 5.4 % (ref 4.8–5.6)
HCT VFR BLD AUTO: 46.9 % (ref 34–46.6)
HGB BLD-MCNC: 15.5 G/DL (ref 12–15.9)
INR PPP: 0.92 (ref 0.89–1.12)
MCH RBC QN AUTO: 29.1 PG (ref 26.6–33)
MCHC RBC AUTO-ENTMCNC: 33 G/DL (ref 31.5–35.7)
MCV RBC AUTO: 88 FL (ref 79–97)
PLATELET # BLD AUTO: 266 10*3/MM3 (ref 140–450)
PMV BLD AUTO: 10.4 FL (ref 6–12)
POTASSIUM SERPL-SCNC: 4 MMOL/L (ref 3.5–5.2)
PROTHROMBIN TIME: 12.5 SECONDS (ref 12.2–14.5)
QT INTERVAL: 366 MS
QTC INTERVAL: 472 MS
RBC # BLD AUTO: 5.33 10*6/MM3 (ref 3.77–5.28)
RH BLD: POSITIVE
SODIUM SERPL-SCNC: 135 MMOL/L (ref 136–145)
T&S EXPIRATION DATE: NORMAL
WBC NRBC COR # BLD AUTO: 13.37 10*3/MM3 (ref 3.4–10.8)

## 2024-04-16 PROCEDURE — 80048 BASIC METABOLIC PNL TOTAL CA: CPT

## 2024-04-16 PROCEDURE — 85027 COMPLETE CBC AUTOMATED: CPT

## 2024-04-16 PROCEDURE — 36415 COLL VENOUS BLD VENIPUNCTURE: CPT

## 2024-04-16 PROCEDURE — 85610 PROTHROMBIN TIME: CPT

## 2024-04-16 PROCEDURE — 86901 BLOOD TYPING SEROLOGIC RH(D): CPT

## 2024-04-16 PROCEDURE — 83036 HEMOGLOBIN GLYCOSYLATED A1C: CPT

## 2024-04-16 PROCEDURE — 93010 ELECTROCARDIOGRAM REPORT: CPT | Performed by: INTERNAL MEDICINE

## 2024-04-16 PROCEDURE — 71046 X-RAY EXAM CHEST 2 VIEWS: CPT

## 2024-04-16 PROCEDURE — 86850 RBC ANTIBODY SCREEN: CPT

## 2024-04-16 PROCEDURE — 86900 BLOOD TYPING SEROLOGIC ABO: CPT

## 2024-04-16 PROCEDURE — 93005 ELECTROCARDIOGRAM TRACING: CPT

## 2024-04-16 RX ORDER — CHLORHEXIDINE GLUCONATE 500 MG/1
1 CLOTH TOPICAL EVERY 12 HOURS PRN
Status: CANCELLED | OUTPATIENT
Start: 2024-04-16

## 2024-04-16 RX ORDER — CHLORHEXIDINE GLUCONATE 500 MG/1
1 CLOTH TOPICAL EVERY 12 HOURS PRN
Status: ACTIVE | OUTPATIENT
Start: 2024-04-16

## 2024-04-16 NOTE — PAT
Patient left her PAT pass, CHG wipes/instruction sheet, bactroban ointment in radiology after her CXR today.  Patient was contacted by radiology staff and patient refused to return to [facility] to retrieve the items.  Thus bactroban and CHG wipes will not be completed as ordered. Patient lives in [place].

## 2024-04-16 NOTE — PAT
An arrival time for procedure was not provided during PAT visit. If patient had any questions or concerns about their arrival time, they were instructed to contact their surgeon/physician.  Additionally, if the patient referred to an arrival time that was acquired from their my chart account, patient was encouraged to verify that time with their surgeon/physician. Arrival times are NOT provided in Pre Admission Testing Department.    PATIENT RECEIVED BACTROBAN WITH INSTRUCTIONS ON HOW TO AND WHEN TO USE. PATIENT VERBALIZED UNDERSTANDING.     Blood bank bracelet applied to patient during Pre Admission Testing visit.  Patient instructed not to remove from arm until after procedure and they are discharged from the hospital.  Explained to patient that they may shower and get the bracelet wet, but not to immerse under water for longer periods (bathing, swimming, hand dishwashing, etc).  Patient verbalized understanding.    Patient denies any current skin issues.     Patient to apply Chlorhexadine wipes  to surgical area (as instructed) the night before procedure and the AM of procedure. Wipes provided.    Patient directed to Radiology Department for CXR after Pre Admission Testing Appointment.     Patient viewed general PAT education video as instructed in their preoperative information received from their surgeon.  Patient stated the general PAT education video was viewed in its entirety and survey completed.  Copies of PAT general education handouts (Incentive Spirometry, Meds to Beds Program, Patient Belongings, Pre-op skin preparation instructions, Blood Glucose testing, Visitor policy, Surgery FAQ, Code H) distributed to patient if not printed. Education related to the PAT pass and skin preparation for surgery (if applicable) completed in PAT as a reinforcement to PAT education video. Patient instructed to return PAT pass provided today as well as completed skin preparation sheet (if applicable) on the day of  procedure.     Additionally if patient had not viewed video yet but intended to view it at home or in our waiting area, then referred them to the handout with QR code/link provided during PAT visit.  Instructed patient to complete survey after viewing the video in its entirety.  Encouraged patient/family to read PAT general education handouts thoroughly and notify PAT staff with any questions or concerns. Patient verbalized understanding of all information and priority content.    PATIENT EKG ON CHART AND IN EPIC FROM 04/16/2024

## 2024-04-17 ENCOUNTER — ANESTHESIA EVENT (OUTPATIENT)
Dept: PERIOP | Facility: HOSPITAL | Age: 64
End: 2024-04-17
Payer: MEDICARE

## 2024-04-17 ENCOUNTER — APPOINTMENT (OUTPATIENT)
Dept: NEUROLOGY | Facility: HOSPITAL | Age: 64
DRG: 038 | End: 2024-04-17
Payer: MEDICARE

## 2024-04-17 ENCOUNTER — ANESTHESIA (OUTPATIENT)
Dept: PERIOP | Facility: HOSPITAL | Age: 64
End: 2024-04-17
Payer: MEDICARE

## 2024-04-17 ENCOUNTER — HOSPITAL ENCOUNTER (INPATIENT)
Facility: HOSPITAL | Age: 64
LOS: 1 days | Discharge: HOME | DRG: 038 | End: 2024-04-18
Attending: THORACIC SURGERY (CARDIOTHORACIC VASCULAR SURGERY) | Admitting: THORACIC SURGERY (CARDIOTHORACIC VASCULAR SURGERY)
Payer: MEDICARE

## 2024-04-17 DIAGNOSIS — I65.21 CAROTID STENOSIS, ASYMPTOMATIC, RIGHT: ICD-10-CM

## 2024-04-17 DIAGNOSIS — R13.13 PHARYNGEAL DYSPHAGIA: Primary | ICD-10-CM

## 2024-04-17 DIAGNOSIS — I73.9 PAD (PERIPHERAL ARTERY DISEASE): ICD-10-CM

## 2024-04-17 PROBLEM — Z72.0 TOBACCO USE: Status: ACTIVE | Noted: 2024-04-17

## 2024-04-17 PROCEDURE — 88311 DECALCIFY TISSUE: CPT | Performed by: THORACIC SURGERY (CARDIOTHORACIC VASCULAR SURGERY)

## 2024-04-17 PROCEDURE — 25010000002 ONDANSETRON PER 1 MG: Performed by: NURSE ANESTHETIST, CERTIFIED REGISTERED

## 2024-04-17 PROCEDURE — 95816 EEG AWAKE AND DROWSY: CPT | Performed by: PSYCHIATRY & NEUROLOGY

## 2024-04-17 PROCEDURE — 25010000002 CEFAZOLIN PER 500 MG: Performed by: THORACIC SURGERY (CARDIOTHORACIC VASCULAR SURGERY)

## 2024-04-17 PROCEDURE — 25010000002 PHENYLEPHRINE 10 MG/ML SOLUTION 1 ML VIAL: Performed by: NURSE ANESTHETIST, CERTIFIED REGISTERED

## 2024-04-17 PROCEDURE — 35301 RECHANNELING OF ARTERY: CPT | Performed by: PHYSICIAN ASSISTANT

## 2024-04-17 PROCEDURE — 25010000002 GENTAMICIN PER 80 MG: Performed by: THORACIC SURGERY (CARDIOTHORACIC VASCULAR SURGERY)

## 2024-04-17 PROCEDURE — 25010000002 HEPARIN (PORCINE) PER 1000 UNITS: Performed by: NURSE ANESTHETIST, CERTIFIED REGISTERED

## 2024-04-17 PROCEDURE — 35301 RECHANNELING OF ARTERY: CPT | Performed by: THORACIC SURGERY (CARDIOTHORACIC VASCULAR SURGERY)

## 2024-04-17 PROCEDURE — 4A10X4Z MONITORING OF CENTRAL NERVOUS ELECTRICAL ACTIVITY, EXTERNAL APPROACH: ICD-10-PCS | Performed by: THORACIC SURGERY (CARDIOTHORACIC VASCULAR SURGERY)

## 2024-04-17 PROCEDURE — 03CK0ZZ EXTIRPATION OF MATTER FROM RIGHT INTERNAL CAROTID ARTERY, OPEN APPROACH: ICD-10-PCS | Performed by: THORACIC SURGERY (CARDIOTHORACIC VASCULAR SURGERY)

## 2024-04-17 PROCEDURE — 25010000002 CEFAZOLIN PER 500 MG: Performed by: PHYSICIAN ASSISTANT

## 2024-04-17 PROCEDURE — 25010000002 ESMOLOL 100 MG/10ML SOLUTION: Performed by: NURSE ANESTHETIST, CERTIFIED REGISTERED

## 2024-04-17 PROCEDURE — 25010000002 NICARDIPINE 2.5 MG/ML SOLUTION: Performed by: NURSE ANESTHETIST, CERTIFIED REGISTERED

## 2024-04-17 PROCEDURE — 25010000002 LIDOCAINE 1 % SOLUTION: Performed by: THORACIC SURGERY (CARDIOTHORACIC VASCULAR SURGERY)

## 2024-04-17 PROCEDURE — 25810000003 LACTATED RINGERS PER 1000 ML: Performed by: ANESTHESIOLOGY

## 2024-04-17 PROCEDURE — 99232 SBSQ HOSP IP/OBS MODERATE 35: CPT

## 2024-04-17 PROCEDURE — 25010000002 DEXAMETHASONE PER 1 MG: Performed by: NURSE ANESTHETIST, CERTIFIED REGISTERED

## 2024-04-17 PROCEDURE — 25010000002 PROTAMINE SULFATE PER 10 MG: Performed by: NURSE ANESTHETIST, CERTIFIED REGISTERED

## 2024-04-17 PROCEDURE — 94664 DEMO&/EVAL PT USE INHALER: CPT

## 2024-04-17 PROCEDURE — 25810000003 SODIUM CHLORIDE 0.9 % SOLUTION 250 ML FLEX CONT: Performed by: NURSE ANESTHETIST, CERTIFIED REGISTERED

## 2024-04-17 PROCEDURE — 25010000002 HEPARIN (PORCINE) PER 1000 UNITS: Performed by: THORACIC SURGERY (CARDIOTHORACIC VASCULAR SURGERY)

## 2024-04-17 PROCEDURE — 94799 UNLISTED PULMONARY SVC/PX: CPT

## 2024-04-17 PROCEDURE — 25010000002 FENTANYL CITRATE (PF) 100 MCG/2ML SOLUTION: Performed by: NURSE ANESTHETIST, CERTIFIED REGISTERED

## 2024-04-17 PROCEDURE — 25010000002 PROPOFOL 10 MG/ML EMULSION: Performed by: NURSE ANESTHETIST, CERTIFIED REGISTERED

## 2024-04-17 PROCEDURE — 03UK0KZ SUPPLEMENT RIGHT INTERNAL CAROTID ARTERY WITH NONAUTOLOGOUS TISSUE SUBSTITUTE, OPEN APPROACH: ICD-10-PCS | Performed by: THORACIC SURGERY (CARDIOTHORACIC VASCULAR SURGERY)

## 2024-04-17 PROCEDURE — 95816 EEG AWAKE AND DROWSY: CPT

## 2024-04-17 PROCEDURE — 88304 TISSUE EXAM BY PATHOLOGIST: CPT | Performed by: THORACIC SURGERY (CARDIOTHORACIC VASCULAR SURGERY)

## 2024-04-17 PROCEDURE — 25010000002 SUGAMMADEX 200 MG/2ML SOLUTION: Performed by: NURSE ANESTHETIST, CERTIFIED REGISTERED

## 2024-04-17 PROCEDURE — C1768 GRAFT, VASCULAR: HCPCS | Performed by: THORACIC SURGERY (CARDIOTHORACIC VASCULAR SURGERY)

## 2024-04-17 PROCEDURE — 95955 EEG DURING SURGERY: CPT | Performed by: PSYCHIATRY & NEUROLOGY

## 2024-04-17 PROCEDURE — 95955 EEG DURING SURGERY: CPT

## 2024-04-17 DEVICE — PTCH VASC VASCUGUARD 1X6CM STRL: Type: IMPLANTABLE DEVICE | Site: CAROTID | Status: FUNCTIONAL

## 2024-04-17 DEVICE — SEALANT HEMO TACHOSIL FIBRIN PTCH 9.5X4.8CM: Type: IMPLANTABLE DEVICE | Site: NECK | Status: FUNCTIONAL

## 2024-04-17 RX ORDER — DROPERIDOL 2.5 MG/ML
0.62 INJECTION, SOLUTION INTRAMUSCULAR; INTRAVENOUS ONCE AS NEEDED
Status: DISCONTINUED | OUTPATIENT
Start: 2024-04-17 | End: 2024-04-18 | Stop reason: HOSPADM

## 2024-04-17 RX ORDER — AMOXICILLIN 250 MG
2 CAPSULE ORAL 2 TIMES DAILY
Status: DISCONTINUED | OUTPATIENT
Start: 2024-04-17 | End: 2024-04-18 | Stop reason: HOSPADM

## 2024-04-17 RX ORDER — SODIUM CHLORIDE 9 MG/ML
40 INJECTION, SOLUTION INTRAVENOUS AS NEEDED
Status: DISCONTINUED | OUTPATIENT
Start: 2024-04-17 | End: 2024-04-18 | Stop reason: HOSPADM

## 2024-04-17 RX ORDER — HEPARIN SODIUM 1000 [USP'U]/ML
INJECTION, SOLUTION INTRAVENOUS; SUBCUTANEOUS AS NEEDED
Status: DISCONTINUED | OUTPATIENT
Start: 2024-04-17 | End: 2024-04-17 | Stop reason: SURG

## 2024-04-17 RX ORDER — MIDAZOLAM HYDROCHLORIDE 1 MG/ML
1 INJECTION INTRAMUSCULAR; INTRAVENOUS
Status: DISCONTINUED | OUTPATIENT
Start: 2024-04-17 | End: 2024-04-17 | Stop reason: HOSPADM

## 2024-04-17 RX ORDER — LABETALOL HYDROCHLORIDE 5 MG/ML
5 INJECTION, SOLUTION INTRAVENOUS
Status: DISCONTINUED | OUTPATIENT
Start: 2024-04-17 | End: 2024-04-18

## 2024-04-17 RX ORDER — NALOXONE HCL 0.4 MG/ML
0.4 VIAL (ML) INJECTION AS NEEDED
Status: DISCONTINUED | OUTPATIENT
Start: 2024-04-17 | End: 2024-04-18 | Stop reason: HOSPADM

## 2024-04-17 RX ORDER — ONDANSETRON 2 MG/ML
4 INJECTION INTRAMUSCULAR; INTRAVENOUS EVERY 6 HOURS PRN
Status: DISCONTINUED | OUTPATIENT
Start: 2024-04-17 | End: 2024-04-18 | Stop reason: HOSPADM

## 2024-04-17 RX ORDER — DROPERIDOL 2.5 MG/ML
0.62 INJECTION, SOLUTION INTRAMUSCULAR; INTRAVENOUS
Status: DISCONTINUED | OUTPATIENT
Start: 2024-04-17 | End: 2024-04-18 | Stop reason: HOSPADM

## 2024-04-17 RX ORDER — FAMOTIDINE 10 MG/ML
20 INJECTION, SOLUTION INTRAVENOUS ONCE
Status: DISCONTINUED | OUTPATIENT
Start: 2024-04-17 | End: 2024-04-17

## 2024-04-17 RX ORDER — SODIUM CHLORIDE, SODIUM LACTATE, POTASSIUM CHLORIDE, CALCIUM CHLORIDE 600; 310; 30; 20 MG/100ML; MG/100ML; MG/100ML; MG/100ML
9 INJECTION, SOLUTION INTRAVENOUS CONTINUOUS
Status: DISCONTINUED | OUTPATIENT
Start: 2024-04-17 | End: 2024-04-18 | Stop reason: HOSPADM

## 2024-04-17 RX ORDER — LIDOCAINE HYDROCHLORIDE 40 MG/ML
4 INJECTION, SOLUTION RETROBULBAR; TOPICAL
Status: DISCONTINUED | OUTPATIENT
Start: 2024-04-17 | End: 2024-04-18

## 2024-04-17 RX ORDER — SODIUM CHLORIDE 0.9 % (FLUSH) 0.9 %
3 SYRINGE (ML) INJECTION EVERY 12 HOURS SCHEDULED
Status: DISCONTINUED | OUTPATIENT
Start: 2024-04-17 | End: 2024-04-18 | Stop reason: HOSPADM

## 2024-04-17 RX ORDER — FENTANYL CITRATE 50 UG/ML
50 INJECTION, SOLUTION INTRAMUSCULAR; INTRAVENOUS
Status: DISCONTINUED | OUTPATIENT
Start: 2024-04-17 | End: 2024-04-18 | Stop reason: HOSPADM

## 2024-04-17 RX ORDER — FENTANYL CITRATE 50 UG/ML
INJECTION, SOLUTION INTRAMUSCULAR; INTRAVENOUS AS NEEDED
Status: DISCONTINUED | OUTPATIENT
Start: 2024-04-17 | End: 2024-04-17 | Stop reason: SURG

## 2024-04-17 RX ORDER — SODIUM CHLORIDE 450 MG/100ML
30 INJECTION, SOLUTION INTRAVENOUS CONTINUOUS
Status: DISCONTINUED | OUTPATIENT
Start: 2024-04-17 | End: 2024-04-18 | Stop reason: HOSPADM

## 2024-04-17 RX ORDER — HYDRALAZINE HYDROCHLORIDE 20 MG/ML
5 INJECTION INTRAMUSCULAR; INTRAVENOUS
Status: DISCONTINUED | OUTPATIENT
Start: 2024-04-17 | End: 2024-04-18

## 2024-04-17 RX ORDER — HYDROMORPHONE HYDROCHLORIDE 1 MG/ML
0.5 INJECTION, SOLUTION INTRAMUSCULAR; INTRAVENOUS; SUBCUTANEOUS
Status: DISCONTINUED | OUTPATIENT
Start: 2024-04-17 | End: 2024-04-18 | Stop reason: HOSPADM

## 2024-04-17 RX ORDER — NICOTINE 21 MG/24HR
1 PATCH, TRANSDERMAL 24 HOURS TRANSDERMAL EVERY 24 HOURS
Status: DISCONTINUED | OUTPATIENT
Start: 2024-04-17 | End: 2024-04-18 | Stop reason: HOSPADM

## 2024-04-17 RX ORDER — BISACODYL 10 MG
10 SUPPOSITORY, RECTAL RECTAL DAILY PRN
Status: DISCONTINUED | OUTPATIENT
Start: 2024-04-17 | End: 2024-04-18 | Stop reason: HOSPADM

## 2024-04-17 RX ORDER — LIDOCAINE HYDROCHLORIDE 10 MG/ML
INJECTION, SOLUTION EPIDURAL; INFILTRATION; INTRACAUDAL; PERINEURAL AS NEEDED
Status: DISCONTINUED | OUTPATIENT
Start: 2024-04-17 | End: 2024-04-17 | Stop reason: SURG

## 2024-04-17 RX ORDER — HYDROCODONE BITARTRATE AND ACETAMINOPHEN 5; 325 MG/1; MG/1
1 TABLET ORAL ONCE AS NEEDED
Status: DISCONTINUED | OUTPATIENT
Start: 2024-04-17 | End: 2024-04-18 | Stop reason: HOSPADM

## 2024-04-17 RX ORDER — FAMOTIDINE 20 MG/1
20 TABLET, FILM COATED ORAL ONCE
Status: COMPLETED | OUTPATIENT
Start: 2024-04-17 | End: 2024-04-17

## 2024-04-17 RX ORDER — HYDROCODONE BITARTRATE AND ACETAMINOPHEN 5; 325 MG/1; MG/1
1 TABLET ORAL EVERY 6 HOURS PRN
Status: DISCONTINUED | OUTPATIENT
Start: 2024-04-17 | End: 2024-04-18 | Stop reason: HOSPADM

## 2024-04-17 RX ORDER — PANTOPRAZOLE SODIUM 40 MG/1
40 TABLET, DELAYED RELEASE ORAL DAILY
Status: DISCONTINUED | OUTPATIENT
Start: 2024-04-18 | End: 2024-04-18 | Stop reason: HOSPADM

## 2024-04-17 RX ORDER — BISACODYL 5 MG/1
5 TABLET, DELAYED RELEASE ORAL DAILY PRN
Status: DISCONTINUED | OUTPATIENT
Start: 2024-04-17 | End: 2024-04-18 | Stop reason: HOSPADM

## 2024-04-17 RX ORDER — SODIUM CHLORIDE 9 MG/ML
40 INJECTION, SOLUTION INTRAVENOUS AS NEEDED
Status: DISCONTINUED | OUTPATIENT
Start: 2024-04-17 | End: 2024-04-17 | Stop reason: HOSPADM

## 2024-04-17 RX ORDER — ESMOLOL HYDROCHLORIDE 10 MG/ML
INJECTION INTRAVENOUS AS NEEDED
Status: DISCONTINUED | OUTPATIENT
Start: 2024-04-17 | End: 2024-04-17 | Stop reason: SURG

## 2024-04-17 RX ORDER — ROCURONIUM BROMIDE 10 MG/ML
INJECTION, SOLUTION INTRAVENOUS AS NEEDED
Status: DISCONTINUED | OUTPATIENT
Start: 2024-04-17 | End: 2024-04-17 | Stop reason: SURG

## 2024-04-17 RX ORDER — CLOPIDOGREL BISULFATE 75 MG/1
75 TABLET ORAL DAILY
Status: DISCONTINUED | OUTPATIENT
Start: 2024-04-18 | End: 2024-04-18 | Stop reason: HOSPADM

## 2024-04-17 RX ORDER — FAMOTIDINE 10 MG/ML
20 INJECTION, SOLUTION INTRAVENOUS EVERY 12 HOURS SCHEDULED
Status: DISCONTINUED | OUTPATIENT
Start: 2024-04-17 | End: 2024-04-17

## 2024-04-17 RX ORDER — PROMETHAZINE HYDROCHLORIDE 25 MG/1
25 TABLET ORAL ONCE AS NEEDED
Status: DISCONTINUED | OUTPATIENT
Start: 2024-04-17 | End: 2024-04-18 | Stop reason: HOSPADM

## 2024-04-17 RX ORDER — SODIUM CHLORIDE 0.9 % (FLUSH) 0.9 %
10 SYRINGE (ML) INJECTION AS NEEDED
Status: DISCONTINUED | OUTPATIENT
Start: 2024-04-17 | End: 2024-04-17 | Stop reason: HOSPADM

## 2024-04-17 RX ORDER — FAMOTIDINE 20 MG/1
20 TABLET, FILM COATED ORAL EVERY 12 HOURS SCHEDULED
Status: DISCONTINUED | OUTPATIENT
Start: 2024-04-17 | End: 2024-04-17

## 2024-04-17 RX ORDER — PROMETHAZINE HYDROCHLORIDE 25 MG/1
25 SUPPOSITORY RECTAL ONCE AS NEEDED
Status: DISCONTINUED | OUTPATIENT
Start: 2024-04-17 | End: 2024-04-18 | Stop reason: HOSPADM

## 2024-04-17 RX ORDER — ONDANSETRON 2 MG/ML
INJECTION INTRAMUSCULAR; INTRAVENOUS AS NEEDED
Status: DISCONTINUED | OUTPATIENT
Start: 2024-04-17 | End: 2024-04-17 | Stop reason: SURG

## 2024-04-17 RX ORDER — ONDANSETRON 4 MG/1
4 TABLET, ORALLY DISINTEGRATING ORAL EVERY 6 HOURS PRN
Status: DISCONTINUED | OUTPATIENT
Start: 2024-04-17 | End: 2024-04-17

## 2024-04-17 RX ORDER — ONDANSETRON 4 MG/1
4 TABLET, ORALLY DISINTEGRATING ORAL EVERY 6 HOURS PRN
Status: DISCONTINUED | OUTPATIENT
Start: 2024-04-17 | End: 2024-04-18 | Stop reason: HOSPADM

## 2024-04-17 RX ORDER — PROTAMINE SULFATE 10 MG/ML
INJECTION, SOLUTION INTRAVENOUS AS NEEDED
Status: DISCONTINUED | OUTPATIENT
Start: 2024-04-17 | End: 2024-04-17 | Stop reason: SURG

## 2024-04-17 RX ORDER — LIDOCAINE HYDROCHLORIDE 10 MG/ML
INJECTION, SOLUTION INFILTRATION; PERINEURAL AS NEEDED
Status: DISCONTINUED | OUTPATIENT
Start: 2024-04-17 | End: 2024-04-17 | Stop reason: HOSPADM

## 2024-04-17 RX ORDER — PROPOFOL 10 MG/ML
VIAL (ML) INTRAVENOUS AS NEEDED
Status: DISCONTINUED | OUTPATIENT
Start: 2024-04-17 | End: 2024-04-17 | Stop reason: SURG

## 2024-04-17 RX ORDER — SODIUM CHLORIDE 0.9 % (FLUSH) 0.9 %
10 SYRINGE (ML) INJECTION EVERY 12 HOURS SCHEDULED
Status: DISCONTINUED | OUTPATIENT
Start: 2024-04-17 | End: 2024-04-18 | Stop reason: HOSPADM

## 2024-04-17 RX ORDER — SODIUM CHLORIDE 0.9 % (FLUSH) 0.9 %
10 SYRINGE (ML) INJECTION AS NEEDED
Status: DISCONTINUED | OUTPATIENT
Start: 2024-04-17 | End: 2024-04-18 | Stop reason: HOSPADM

## 2024-04-17 RX ORDER — POLYETHYLENE GLYCOL 3350 17 G/17G
17 POWDER, FOR SOLUTION ORAL DAILY
Status: DISCONTINUED | OUTPATIENT
Start: 2024-04-17 | End: 2024-04-18 | Stop reason: HOSPADM

## 2024-04-17 RX ORDER — ROSUVASTATIN CALCIUM 20 MG/1
20 TABLET, COATED ORAL DAILY
Status: DISCONTINUED | OUTPATIENT
Start: 2024-04-18 | End: 2024-04-18 | Stop reason: HOSPADM

## 2024-04-17 RX ORDER — NICARDIPINE HYDROCHLORIDE 2.5 MG/ML
INJECTION INTRAVENOUS AS NEEDED
Status: DISCONTINUED | OUTPATIENT
Start: 2024-04-17 | End: 2024-04-17 | Stop reason: SURG

## 2024-04-17 RX ORDER — IPRATROPIUM BROMIDE AND ALBUTEROL SULFATE 2.5; .5 MG/3ML; MG/3ML
3 SOLUTION RESPIRATORY (INHALATION) EVERY 4 HOURS PRN
Status: DISCONTINUED | OUTPATIENT
Start: 2024-04-17 | End: 2024-04-18 | Stop reason: HOSPADM

## 2024-04-17 RX ORDER — IPRATROPIUM BROMIDE AND ALBUTEROL SULFATE 2.5; .5 MG/3ML; MG/3ML
3 SOLUTION RESPIRATORY (INHALATION) ONCE AS NEEDED
Status: DISCONTINUED | OUTPATIENT
Start: 2024-04-17 | End: 2024-04-18 | Stop reason: HOSPADM

## 2024-04-17 RX ORDER — SODIUM CHLORIDE 0.9 % (FLUSH) 0.9 %
3-10 SYRINGE (ML) INJECTION AS NEEDED
Status: DISCONTINUED | OUTPATIENT
Start: 2024-04-17 | End: 2024-04-18 | Stop reason: HOSPADM

## 2024-04-17 RX ORDER — AMLODIPINE BESYLATE 10 MG/1
10 TABLET ORAL DAILY
Status: DISCONTINUED | OUTPATIENT
Start: 2024-04-18 | End: 2024-04-18 | Stop reason: HOSPADM

## 2024-04-17 RX ORDER — SODIUM CHLORIDE 0.9 % (FLUSH) 0.9 %
10 SYRINGE (ML) INJECTION EVERY 12 HOURS SCHEDULED
Status: DISCONTINUED | OUTPATIENT
Start: 2024-04-17 | End: 2024-04-17 | Stop reason: HOSPADM

## 2024-04-17 RX ORDER — MORPHINE SULFATE 4 MG/ML
4 INJECTION, SOLUTION INTRAMUSCULAR; INTRAVENOUS
Status: DISCONTINUED | OUTPATIENT
Start: 2024-04-17 | End: 2024-04-18 | Stop reason: HOSPADM

## 2024-04-17 RX ORDER — ONDANSETRON 2 MG/ML
4 INJECTION INTRAMUSCULAR; INTRAVENOUS ONCE AS NEEDED
Status: DISCONTINUED | OUTPATIENT
Start: 2024-04-17 | End: 2024-04-18 | Stop reason: HOSPADM

## 2024-04-17 RX ORDER — BUPIVACAINE HCL/0.9 % NACL/PF 0.125 %
PLASTIC BAG, INJECTION (ML) EPIDURAL AS NEEDED
Status: DISCONTINUED | OUTPATIENT
Start: 2024-04-17 | End: 2024-04-17 | Stop reason: SURG

## 2024-04-17 RX ORDER — LIDOCAINE HYDROCHLORIDE 10 MG/ML
0.5 INJECTION, SOLUTION EPIDURAL; INFILTRATION; INTRACAUDAL; PERINEURAL ONCE AS NEEDED
Status: COMPLETED | OUTPATIENT
Start: 2024-04-17 | End: 2024-04-17

## 2024-04-17 RX ORDER — DEXAMETHASONE SODIUM PHOSPHATE 4 MG/ML
INJECTION, SOLUTION INTRA-ARTICULAR; INTRALESIONAL; INTRAMUSCULAR; INTRAVENOUS; SOFT TISSUE AS NEEDED
Status: DISCONTINUED | OUTPATIENT
Start: 2024-04-17 | End: 2024-04-17 | Stop reason: SURG

## 2024-04-17 RX ADMIN — FAMOTIDINE 20 MG: 20 TABLET ORAL at 13:05

## 2024-04-17 RX ADMIN — ESMOLOL HYDROCHLORIDE 100 MG: 10 INJECTION, SOLUTION INTRAVENOUS at 14:17

## 2024-04-17 RX ADMIN — DEXAMETHASONE SODIUM PHOSPHATE 4 MG: 4 INJECTION INTRA-ARTICULAR; INTRALESIONAL; INTRAMUSCULAR; INTRAVENOUS; SOFT TISSUE at 14:32

## 2024-04-17 RX ADMIN — FENTANYL CITRATE 100 MCG: 50 INJECTION, SOLUTION INTRAMUSCULAR; INTRAVENOUS at 15:04

## 2024-04-17 RX ADMIN — ONDANSETRON 4 MG: 2 INJECTION INTRAMUSCULAR; INTRAVENOUS at 14:52

## 2024-04-17 RX ADMIN — Medication 1 PATCH: at 19:53

## 2024-04-17 RX ADMIN — NICARDIPINE HYDROCHLORIDE 0.4 MG: 25 INJECTION, SOLUTION INTRAVENOUS at 14:48

## 2024-04-17 RX ADMIN — HEPARIN SODIUM 7000 UNITS: 1000 INJECTION INTRAVENOUS; SUBCUTANEOUS at 14:28

## 2024-04-17 RX ADMIN — HYDROCODONE BITARTRATE AND ACETAMINOPHEN 1 TABLET: 5; 325 TABLET ORAL at 21:31

## 2024-04-17 RX ADMIN — PROTAMINE SULFATE 75 MG: 10 INJECTION, SOLUTION INTRAVENOUS at 14:52

## 2024-04-17 RX ADMIN — SUGAMMADEX 150 MG: 100 INJECTION, SOLUTION INTRAVENOUS at 15:01

## 2024-04-17 RX ADMIN — PHENYLEPHRINE HYDROCHLORIDE 0.5 MCG/KG/MIN: 10 INJECTION INTRAVENOUS at 14:24

## 2024-04-17 RX ADMIN — SODIUM CHLORIDE, POTASSIUM CHLORIDE, SODIUM LACTATE AND CALCIUM CHLORIDE 9 ML/HR: 600; 310; 30; 20 INJECTION, SOLUTION INTRAVENOUS at 13:05

## 2024-04-17 RX ADMIN — LIDOCAINE HYDROCHLORIDE 4 ML: 40 INJECTION, SOLUTION RETROBULBAR; TOPICAL at 20:39

## 2024-04-17 RX ADMIN — ROCURONIUM BROMIDE 50 MG: 10 INJECTION INTRAVENOUS at 14:17

## 2024-04-17 RX ADMIN — SODIUM CHLORIDE 2000 MG: 900 INJECTION INTRAVENOUS at 23:36

## 2024-04-17 RX ADMIN — Medication 200 MCG: at 15:00

## 2024-04-17 RX ADMIN — PROPOFOL 150 MG: 10 INJECTION, EMULSION INTRAVENOUS at 14:17

## 2024-04-17 RX ADMIN — LIDOCAINE HYDROCHLORIDE 50 MG: 10 INJECTION, SOLUTION EPIDURAL; INFILTRATION; INTRACAUDAL; PERINEURAL at 14:17

## 2024-04-17 RX ADMIN — Medication 10 ML: at 20:07

## 2024-04-17 RX ADMIN — LIDOCAINE HYDROCHLORIDE 0.5 ML: 10 INJECTION, SOLUTION EPIDURAL; INFILTRATION; INTRACAUDAL; PERINEURAL at 13:05

## 2024-04-17 RX ADMIN — SODIUM CHLORIDE 2 G: 900 INJECTION INTRAVENOUS at 14:20

## 2024-04-17 NOTE — ANESTHESIA POSTPROCEDURE EVALUATION
Patient: Shannon Chan    Procedure Summary       Date: 04/17/24 Room / Location:  [facility]    Anesthesia Start: 1413 Anesthesia Stop: 1549    Procedure: RIGHT CAROTID ENDARTERECTOMY/EEG (Right: Neck) Diagnosis:       Carotid stenosis, asymptomatic, right      (Carotid stenosis, asymptomatic, right [I65.21])    Surgeons: Dallin Quinn MD Provider: Christine Gonzalez DO    Anesthesia Type: generalAllie ASA Status: 3            Anesthesia Type: general, Rupert    Vitals  Vitals Value Taken Time   BP     Temp     Pulse 91 04/17/24 1551   Resp     SpO2     Vitals shown include unfiled device data.        Post Anesthesia Care and Evaluation    Patient location during evaluation: PACU  Patient participation: complete - patient participated  Level of consciousness: awake and alert  Pain score: 0  Pain management: adequate    Airway patency: patent  Anesthetic complications: No anesthetic complications  PONV Status: none  Cardiovascular status: hemodynamically stable and acceptable  Respiratory status: nonlabored ventilation, acceptable, nasal cannula and spontaneous ventilation  Hydration status: acceptable    Comments: VSS  No anesthesia care post op

## 2024-04-17 NOTE — ANESTHESIA PREPROCEDURE EVALUATION
Anesthesia Evaluation     Patient summary reviewed and Nursing notes reviewed                Airway   Mallampati: II  TM distance: >3 FB  Neck ROM: full  No difficulty expected  Dental - normal exam   (+) upper dentures    Pulmonary - normal exam   (+) a smoker Current, COPD,  Cardiovascular - normal exam    (+) hypertension, CAD, cardiac stents (2016) , PVD, hyperlipidemia,  carotid artery disease (s/p L CEA)      Neuro/Psych- negative ROS  GI/Hepatic/Renal/Endo    (+) GERD    Musculoskeletal (-) negative ROS    Abdominal  - normal exam    Bowel sounds: normal.   Substance History   (+) alcohol use     OB/GYN negative ob/gyn ROS         Other                      Anesthesia Plan    ASA 3     general and Rembert     intravenous induction     Anesthetic plan, risks, benefits, and alternatives have been provided, discussed and informed consent has been obtained with: patient.    Plan discussed with CRNA.    CODE STATUS:

## 2024-04-17 NOTE — ANESTHESIA PROCEDURE NOTES
Airway  Urgency: elective    Date/Time: 4/17/2024 2:19 PM  Airway not difficult    General Information and Staff    Patient location during procedure: OR  CRNA/CAA: Seb Thomas CRNA    Indications and Patient Condition  Indications for airway management: airway protection    Preoxygenated: yes  MILS not maintained throughout  Mask difficulty assessment: 1 - vent by mask    Final Airway Details  Final airway type: endotracheal airway      Successful airway: ETT  Cuffed: yes   Successful intubation technique: direct laryngoscopy  Endotracheal tube insertion site: oral  Blade: Romeo  Blade size: 3  ETT size (mm): 6.5  Cormack-Lehane Classification: grade I - full view of glottis  Placement verified by: chest auscultation and capnometry   Measured from: lips  ETT/EBT  to lips (cm): 20  Number of attempts at approach: 1  Assessment: lips, teeth, and gum same as pre-op and atraumatic intubation    Additional Comments  Negative epigastric sounds, Breath sound equal bilaterally with symmetric chest rise and fall

## 2024-04-17 NOTE — OP NOTE
Operative Report    Preop Diagnosis: Previous femoral endarterectomy.  Previous left carotid endarterectomy.  Now with significant 80% right carotid stenosis      Postoperative Diagnosis: Same      Procedure: Right carotid endarterectomy with pericardial patch closure and EEG monitoring        Surgeons: Dallin Quinn MD      Assistant: Marysol Sotelo PA-C    The Assistant provided services of suctioning, irrigation and retraction.  Also, assisted in suture closure of parts of the skin incision.      Indication: This is a patient fairly well-known to me on whom I performed previous left-sided carotid endarterectomy.  I also performed a previous femoral endarterectomy.  She was now referred to me back specifically to see right-sided carotid artery stenosis.  She understood that surgery carries with the risk of stroke bleeding infection death and agreed to proceed        Description: Supine position.  Sterile prep and drape.  Antibiotics given.  General endotracheal anesthesia.  An incision made on the right neck anterior to the sternocleidomastoid and through the platysma.  Care was taken to identify not injure the hypoglossal and vagus nerves.  Heparin was given and then the internal, external, and common carotid arteries were sequentially clamped with no deleterious EEG changes encountered.  The artery was opened with a heavy calcific plaque which was endarterectomized to a smooth surface.  Brisk backbleeding was noted from the internal carotid.  A pericardial patch was sutured over the arteriotomy with 6-0 Prolene suture and the graft was flushed free of air and debris.  The clamps were released in the proper sequential fashion and again no deleterious EEG changes were encountered.  Protamine was given to reverse the heparin the incision was irrigated with antibiotic solution and a small Fawad-Scott drain placed the incision closed with multiple layers of suture and the sponge and needle count reported as  correct estimated blood loss less than 75 mL there was no apparent early complications      Please note that portions of this note were completed with a voice recognition program. Efforts were made to edit the dictations, but occasionally words are mistranscribed.

## 2024-04-17 NOTE — H&P
Pre-Op H&P  Shannon Desai Day  [id number]  1960      Chief complaint: Right Carotid Stenosis      Subjective:  Patient is a 63 y.o.female presents for scheduled surgery by Dr. Albarran. She anticipates a RIGHT CAROTID ENDARTERECTOMY/EEG - Right today.  The patient endorses having random episodes of dizziness that started approximately 1 month ago.  These episodes could happen at rest or with movement.  She also endorses difficulty swallowing and frequent choking that started around a month ago as well.    The last dose of Xarelto was on 4/15/24.    Review of Systems:  Constitutional-- No fever, chills or sweats. No fatigue.  CV-- No chest pain, palpitation or syncope. +HTN, HLD.  Resp-- No SOB, cough, hemoptysis  Skin--No rashes or lesions      Allergies:   Allergies   Allergen Reactions    Percocet [Oxycodone-Acetaminophen] Nausea And Vomiting     N/V    Lortab [Hydrocodone-Acetaminophen] Nausea And Vomiting    Darvon [Propoxyphene] Nausea And Vomiting     Darvocet         Home Meds:  Medications Prior to Admission   Medication Sig Dispense Refill Last Dose    pantoprazole (PROTONIX) 40 MG EC tablet Take 1 tablet by mouth Every Morning.   4/16/2024    amLODIPine (NORVASC) 10 MG tablet Take 1 tablet by mouth Every Morning.  3 4/15/2024    aspirin 81 MG EC tablet Take 1 tablet by mouth Daily.  3 4/15/2024    Rivaroxaban (XARELTO) 2.5 MG tablet Take 1 tablet by mouth 2 (Two) Times a Day With Meals. Indications: Post Surgical - Other (Patient taking differently: Take 1 tablet by mouth 2 (Two) Times a Day With Meals. LAST DOSE 04/15/2024 PER PATIENT PER DR ALBARRAN   Indications: Post Surgical - Other) 60 tablet 5 4/15/2024    rosuvastatin (CRESTOR) 20 MG tablet Take 1 tablet by mouth Every Morning.  3 4/15/2024         PMH:   Past Medical History:   Diagnosis Date    Anxiety     Arthritis     Coronary artery disease     s/p stent in 2016    Depression     Dyslipidemia     Dysphagia     GERD (gastroesophageal  "reflux disease)     Hyperlipidemia     Hypertension     Myocardial infarction     age 29    Peripheral vascular disease     Wears dentures     UPPER PLATE     Wears glasses      PSH:    Past Surgical History:   Procedure Laterality Date    AORTAGRAM N/A 3/8/2022    Procedure: AORTAGRAM WITH RUNOFFS;  Surgeon: Dallin Quinn MD;  Location:  [facility];  Service: Vascular;  Laterality: N/A;  FLURO-1 MIN 42 SEC    DOSE- 99.36mGy    VISI-60ML    AORTAGRAM N/A 4/29/2022    Procedure: ARTERIOGRAM OF RIGHT FERMORAL ARTERY WITH RUNOFFS AND PROFUNDAPLASTY;  Surgeon: Dallin Quinn MD;  Location:  [facility];  Service: Vascular;  Laterality: N/A;  FL:  Dose:  Contrast:      CARDIAC CATHETERIZATION  2016    [place]    CAROTID ENDARTERECTOMY Left 4/2/2019    Procedure: CAROTID ENDARTERECTOMY WITH EEG LEFT;  Surgeon: Dallin Quinn MD;  Location: [facility] OR;  Service: Vascular    CORONARY STENT PLACEMENT  2016    LADx2,Circx1 [place]    FEMORAL ENDARTERECTOMY N/A 4/29/2022    Procedure: FEMORAL ENDARTERECTOMY;  Surgeon: Dallin Quinn MD;  Location:  [facility];  Service: Vascular;  Laterality: N/A;  FL: 26 SEC  DOSE: 0.6 mGY       KNEE SURGERY Left     fracture repair    TUBAL ABDOMINAL LIGATION         Immunization History:  Influenza: No  Pneumococcal: No  Tetanus: Yes  Covid : No    Social History:   Tobacco:   Social History     Tobacco Use   Smoking Status Every Day    Current packs/day: 0.50    Average packs/day: 0.5 packs/day for 46.0 years (23.0 ttl pk-yrs)    Types: Cigarettes   Smokeless Tobacco Never   Tobacco Comments    started smoking at age 12      Alcohol:     Social History     Substance and Sexual Activity   Alcohol Use Yes    Alcohol/week: 21.0 standard drinks of alcohol    Types: 21 Cans of beer per week         Physical Exam:/90 (BP Location: Right arm, Patient Position: Lying)   Pulse 99   Temp 99.2 °F (37.3 °C) (Temporal)   Resp 16   Ht 160 cm (63\")   Wt " 45.8 kg (101 lb)   SpO2 92%   BMI 17.89 kg/m²       General Appearance:    Alert, cooperative, no distress, appears stated age   Head:    Normocephalic, without obvious abnormality, atraumatic   Lungs:     Clear to auscultation bilaterally, respirations unlabored    Heart:   Regular rate and rhythm, S1 and S2 normal    Abdomen:    Soft without tenderness   Extremities:   Extremities normal, atraumatic, no cyanosis or edema   Skin:   Skin color, texture, turgor normal, no rashes or lesions   Neurologic:   Grossly intact     Results Review:     LABS:  Lab Results   Component Value Date    WBC 13.37 (H) 04/16/2024    HGB 15.5 04/16/2024    HCT 46.9 (H) 04/16/2024    MCV 88.0 04/16/2024     04/16/2024    NEUTROABS 5.31 05/24/2022    GLUCOSE 109 (H) 04/16/2024    BUN 10 04/16/2024    CREATININE 0.83 04/16/2024    EGFRIFNONA 75 04/03/2019     (L) 04/16/2024    K 4.0 04/16/2024    CL 97 (L) 04/16/2024    CO2 24.0 04/16/2024    CALCIUM 10.3 04/16/2024    ALBUMIN 3.30 (L) 05/23/2022    AST 20 05/23/2022    ALT 10 05/23/2022    BILITOT <0.2 05/23/2022       RADIOLOGY:  Imaging Results (Last 72 Hours)       ** No results found for the last 72 hours. **            I reviewed the patient's new clinical results.    Cancer Staging (if applicable)  Cancer Patient: __ yes _x_no __unknown; If yes, clinical stage T:__ N:__M:__, stage group or __N/A      Impression: Right Carotid Stenosis      Plan: RIGHT CAROTID ENDARTERECTOMY/EEG - Right   Patient is aware of the procedure risk of stroke bleeding infection death and agrees to proceed    Srini Sauer, APRN   4/17/2024   13:07 EDT

## 2024-04-17 NOTE — PROGRESS NOTES
Intensive Care Follow-up     Hospital:  LOS: 0 days   Ms. Shannon Chan, 63 y.o. female is followed for:   Carotid stenosis, asymptomatic, right          Subjective   Interval History:  Chart reviewed. Encountered patient upon arrival to the ICU. Patient is complaining of some mild incisional pain along with pain at arterial line in right radial. Denies nausea, vomiting, diarrhea, fever, chills. Preoperative labs showing some mildly elevated WBCs. Not requiring vasoactive drips. Hemodynamically stable with adequate oxygenation on room air     The patient's past medical, surgical and social history were reviewed and updated in Epic as appropriate.       Objective     Infusions:  lactated ringers, 9 mL/hr, Last Rate: 9 mL/hr (04/17/24 1413)  niCARdipine, 5-15 mg/hr  niCARdipine, 5-15 mg/hr  phenylephrine, 0.5-3 mcg/kg/min  sodium chloride, 30 mL/hr      Medications:  ceFAZolin, 2,000 mg, Intravenous, Q8H  [START ON 4/18/2024] clopidogrel, 75 mg, Oral, Daily  famotidine, 20 mg, Intravenous, Q12H   Or  famotidine, 20 mg, Oral, Q12H  lidocaine, 4 mL, Nebulization, TID - RT  senna-docusate sodium, 2 tablet, Oral, BID   And  polyethylene glycol, 17 g, Oral, Daily  sodium chloride, 10 mL, Intravenous, Q12H      I reviewed the patient's medications.    Vital Sign Min/Max for last 24 hours  Temp  Min: 97.4 °F (36.3 °C)  Max: 99.2 °F (37.3 °C)   BP  Min: 76/60  Max: 125/90   Pulse  Min: 78  Max: 99   Resp  Min: 16  Max: 16   SpO2  Min: 92 %  Max: 93 %   No data recorded       Input/Output for last 24 hour shift  No intake/output data recorded.   S RR:  [5-11] 5    Physical Exam:  GENERAL: Patient resting in bed. NAD.    HEENT: Normocephalic and atraumatic. Trachea midline. PER. EOM WNL. R neck incision C/D/I, KATYA. SHAZIA drain intact with bloody drainage.    LUNGS: Chest rise of normal depth and symmetric. Lungs with wheezing bilaterally.    HEART: S1,S2 detected. Regular rate and rhythm. No rub, murmur, or gallop.   ABDOMEN:  Soft, round, nondistended, and nontender. Bowel sounds present.   EXTREMITIES: No clubbing, edema, or cyanosis. Weak pulses BLE. Skin warm and dry. Right radial A-line dressing C/D/I with good waveform   NEURO/PSYCH: Alert and oriented. Follows commands. Moves all extremities. No focal deficits.      Results from last 7 days   Lab Units 04/16/24  0929   WBC 10*3/mm3 13.37*   HEMOGLOBIN g/dL 15.5   PLATELETS 10*3/mm3 266     Results from last 7 days   Lab Units 04/16/24  0929   SODIUM mmol/L 135*   POTASSIUM mmol/L 4.0   CO2 mmol/L 24.0   BUN mg/dL 10   CREATININE mg/dL 0.83   GLUCOSE mg/dL 109*     Estimated Creatinine Clearance: 50.8 mL/min (by C-G formula based on SCr of 0.83 mg/dL).          I reviewed the patient's new clinical results.  I reviewed the patient's new imaging results/reports including actual images and agree with reports.     Imaging Results (Last 24 Hours)       ** No results found for the last 24 hours. **            Assessment & Plan   Impression      Carotid stenosis, asymptomatic, right    Carotid stenosis, right    Shannon Chan is a 63 year-old female with carotid disease s/p left CEA (2019), PVD s/p femoral endarterectomy (2022) on Xarelto, ongoing tobacco abuse, CAD s/p stents x 3  (2016), emphysema, HTN, dyslipidemia, ETOH use (21 beers / week), and GERD that presents to [facility] ICU postoperatively from scheduled right CEA with Dr. Quinn today.     Patient was referred back to Dr. Quinn after CTA demonstrated 80-85% stenosis of right carotid stenosis and he recommended surgical intervention. Patient denies symptoms. Last dose of Xarelto 4/15/24. Of note, following right femoral endarterectomy in 2022, patient experienced wound dehiscence with subsequent E. Coli infection in the wound bed requiring inpatient treatment with IV antibiotics directed by ID.      Plan      Admit to ICU  Postop orders per surgery  Nicardipine for blood pressure control per protocol  Neurovascular checks per  protocol  Aspirin/statin/Plavix  Ensure adequate pain control  Mobilize patient per protocol  Aggressive pulmonary toilet  Nicotine patch. Patient unwilling to quit smoking per my conversation with her.   PRN duo-nebs for emphysema  Reorder home medications as appropriate  SCDs for DVT prophylaxis  AM labs. Trend WBCs. No signs or symptoms of infection currently.       Time spent: 39 minutes  Plan of care and goals reviewed with multidisciplinary/antibiotic stewardship team during rounds.   I discussed the patient's findings and my recommendations with patient and nursing staff     Kiah Ryan, MSN, APRN, AGACNP-BC  Pulmonary and Critical Care Medicine

## 2024-04-17 NOTE — PLAN OF CARE
Goal Outcome Evaluation:  Plan of Care Reviewed With: patient        Progress: improving       Patient arrived to unit around 1700 s/p R CEA. Dressing over site clean dry intact. SHAZIA with 10 ml bloody drainage. VSS, SBP 90s-100s. Has not required pressors or cardene. No complaints of pain. Alert and Ox4. On RA. 450 iin UOP.

## 2024-04-18 ENCOUNTER — READMISSION MANAGEMENT (OUTPATIENT)
Dept: CALL CENTER | Facility: HOSPITAL | Age: 64
End: 2024-04-18
Payer: MEDICARE

## 2024-04-18 ENCOUNTER — ANCILLARY PROCEDURE (OUTPATIENT)
Dept: SPEECH THERAPY | Facility: HOSPITAL | Age: 64
DRG: 038 | End: 2024-04-18
Payer: MEDICARE

## 2024-04-18 VITALS
WEIGHT: 101 LBS | HEART RATE: 101 BPM | DIASTOLIC BLOOD PRESSURE: 73 MMHG | HEIGHT: 63 IN | TEMPERATURE: 98.7 F | OXYGEN SATURATION: 93 % | BODY MASS INDEX: 17.89 KG/M2 | RESPIRATION RATE: 18 BRPM | SYSTOLIC BLOOD PRESSURE: 96 MMHG

## 2024-04-18 LAB
ANION GAP SERPL CALCULATED.3IONS-SCNC: 16 MMOL/L (ref 5–15)
BASOPHILS # BLD AUTO: 0.05 10*3/MM3 (ref 0–0.2)
BASOPHILS NFR BLD AUTO: 0.3 % (ref 0–1.5)
BUN SERPL-MCNC: 7 MG/DL (ref 8–23)
BUN/CREAT SERPL: 9.3 (ref 7–25)
CALCIUM SPEC-SCNC: 8.8 MG/DL (ref 8.6–10.5)
CHLORIDE SERPL-SCNC: 99 MMOL/L (ref 98–107)
CO2 SERPL-SCNC: 21 MMOL/L (ref 22–29)
CREAT SERPL-MCNC: 0.75 MG/DL (ref 0.57–1)
DEPRECATED RDW RBC AUTO: 40.2 FL (ref 37–54)
EGFRCR SERPLBLD CKD-EPI 2021: 90.1 ML/MIN/1.73
EOSINOPHIL # BLD AUTO: 0 10*3/MM3 (ref 0–0.4)
EOSINOPHIL NFR BLD AUTO: 0 % (ref 0.3–6.2)
ERYTHROCYTE [DISTWIDTH] IN BLOOD BY AUTOMATED COUNT: 12.8 % (ref 12.3–15.4)
GLUCOSE SERPL-MCNC: 135 MG/DL (ref 65–99)
HCT VFR BLD AUTO: 39.6 % (ref 34–46.6)
HGB BLD-MCNC: 13.7 G/DL (ref 12–15.9)
IMM GRANULOCYTES # BLD AUTO: 0.1 10*3/MM3 (ref 0–0.05)
IMM GRANULOCYTES NFR BLD AUTO: 0.5 % (ref 0–0.5)
LYMPHOCYTES # BLD AUTO: 2.12 10*3/MM3 (ref 0.7–3.1)
LYMPHOCYTES NFR BLD AUTO: 10.8 % (ref 19.6–45.3)
MAGNESIUM SERPL-MCNC: 1.6 MG/DL (ref 1.6–2.4)
MCH RBC QN AUTO: 29.8 PG (ref 26.6–33)
MCHC RBC AUTO-ENTMCNC: 34.6 G/DL (ref 31.5–35.7)
MCV RBC AUTO: 86.1 FL (ref 79–97)
MONOCYTES # BLD AUTO: 1.06 10*3/MM3 (ref 0.1–0.9)
MONOCYTES NFR BLD AUTO: 5.4 % (ref 5–12)
NEUTROPHILS NFR BLD AUTO: 16.35 10*3/MM3 (ref 1.7–7)
NEUTROPHILS NFR BLD AUTO: 83 % (ref 42.7–76)
NRBC BLD AUTO-RTO: 0 /100 WBC (ref 0–0.2)
PHOSPHATE SERPL-MCNC: 3.6 MG/DL (ref 2.5–4.5)
PLATELET # BLD AUTO: 247 10*3/MM3 (ref 140–450)
PMV BLD AUTO: 10 FL (ref 6–12)
POTASSIUM SERPL-SCNC: 4 MMOL/L (ref 3.5–5.2)
RBC # BLD AUTO: 4.6 10*6/MM3 (ref 3.77–5.28)
SODIUM SERPL-SCNC: 136 MMOL/L (ref 136–145)
WBC NRBC COR # BLD AUTO: 19.68 10*3/MM3 (ref 3.4–10.8)

## 2024-04-18 PROCEDURE — 25010000002 CEFAZOLIN PER 500 MG: Performed by: PHYSICIAN ASSISTANT

## 2024-04-18 PROCEDURE — 85025 COMPLETE CBC W/AUTO DIFF WBC: CPT | Performed by: PHYSICIAN ASSISTANT

## 2024-04-18 PROCEDURE — 25010000002 HYDRALAZINE PER 20 MG: Performed by: NURSE PRACTITIONER

## 2024-04-18 PROCEDURE — 94761 N-INVAS EAR/PLS OXIMETRY MLT: CPT

## 2024-04-18 PROCEDURE — 99024 POSTOP FOLLOW-UP VISIT: CPT

## 2024-04-18 PROCEDURE — 80048 BASIC METABOLIC PNL TOTAL CA: CPT | Performed by: PHYSICIAN ASSISTANT

## 2024-04-18 PROCEDURE — 25010000002 ONDANSETRON PER 1 MG: Performed by: PHYSICIAN ASSISTANT

## 2024-04-18 PROCEDURE — 92612 ENDOSCOPY SWALLOW (FEES) VID: CPT | Performed by: SPEECH-LANGUAGE PATHOLOGIST

## 2024-04-18 PROCEDURE — 99232 SBSQ HOSP IP/OBS MODERATE 35: CPT | Performed by: INTERNAL MEDICINE

## 2024-04-18 PROCEDURE — 83735 ASSAY OF MAGNESIUM: CPT

## 2024-04-18 PROCEDURE — 92610 EVALUATE SWALLOWING FUNCTION: CPT | Performed by: SPEECH-LANGUAGE PATHOLOGIST

## 2024-04-18 PROCEDURE — 94799 UNLISTED PULMONARY SVC/PX: CPT

## 2024-04-18 PROCEDURE — 84100 ASSAY OF PHOSPHORUS: CPT

## 2024-04-18 RX ORDER — HYDRALAZINE HYDROCHLORIDE 20 MG/ML
5 INJECTION INTRAMUSCULAR; INTRAVENOUS
Status: DISCONTINUED | OUTPATIENT
Start: 2024-04-18 | End: 2024-04-18 | Stop reason: HOSPADM

## 2024-04-18 RX ORDER — LABETALOL HYDROCHLORIDE 5 MG/ML
5 INJECTION, SOLUTION INTRAVENOUS
Status: DISCONTINUED | OUTPATIENT
Start: 2024-04-18 | End: 2024-04-18 | Stop reason: HOSPADM

## 2024-04-18 RX ORDER — NICOTINE 21 MG/24HR
1 PATCH, TRANSDERMAL 24 HOURS TRANSDERMAL EVERY 24 HOURS
Qty: 30 EACH | Refills: 1 | Status: SHIPPED | OUTPATIENT
Start: 2024-04-18

## 2024-04-18 RX ORDER — CLOPIDOGREL BISULFATE 75 MG/1
75 TABLET ORAL DAILY
Qty: 2 TABLET | Refills: 0 | Status: SHIPPED | OUTPATIENT
Start: 2024-04-19

## 2024-04-18 RX ADMIN — Medication 3 ML: at 09:18

## 2024-04-18 RX ADMIN — LIDOCAINE HYDROCHLORIDE 4 ML: 40 INJECTION, SOLUTION RETROBULBAR; TOPICAL at 07:45

## 2024-04-18 RX ADMIN — Medication 10 ML: at 09:18

## 2024-04-18 RX ADMIN — HYDRALAZINE HYDROCHLORIDE 5 MG: 20 INJECTION INTRAMUSCULAR; INTRAVENOUS at 00:09

## 2024-04-18 RX ADMIN — ONDANSETRON 4 MG: 2 INJECTION INTRAMUSCULAR; INTRAVENOUS at 04:16

## 2024-04-18 RX ADMIN — ROSUVASTATIN CALCIUM 20 MG: 20 TABLET, COATED ORAL at 09:18

## 2024-04-18 RX ADMIN — SODIUM CHLORIDE 2000 MG: 900 INJECTION INTRAVENOUS at 09:17

## 2024-04-18 RX ADMIN — CLOPIDOGREL BISULFATE 75 MG: 75 TABLET ORAL at 09:18

## 2024-04-18 RX ADMIN — PANTOPRAZOLE SODIUM 40 MG: 40 TABLET, DELAYED RELEASE ORAL at 09:18

## 2024-04-18 RX ADMIN — SENNOSIDES AND DOCUSATE SODIUM 2 TABLET: 8.6; 5 TABLET ORAL at 09:17

## 2024-04-18 RX ADMIN — AMLODIPINE BESYLATE 10 MG: 10 TABLET ORAL at 09:18

## 2024-04-18 NOTE — PLAN OF CARE
Goal Outcome Evaluation:   Pt A&O x 4. On RA, IS encouraged. SR w/ PACs & PVCs. Pain managed with PRNs. Hydralazine given to keep SBP < 110, Cardene & Nino remain off. SHAZIA output of 70 mL, sanguineous. UOP of 1100 mL. No BM. VSS.

## 2024-04-18 NOTE — DISCHARGE INSTRUCTIONS
Monitor surgical wounds daily. Keep incisons clean and dry at all times.  Call CT Surgery office [phone]  with any questions or concerns, specifically let them know of increased redness, drainage, or opening up of incision.

## 2024-04-18 NOTE — PAYOR COMM NOTE
"[facility]# [id number]   4/18/24 Discharge Date, no DC Summary    Utilization Review  Phone [phone]  Fax [phone]    [place] [address]             Shannon Becerra \"Vicky\" (63 y.o. Female)       Date of Birth   1960    Social Security Number       Address   [address] [address]    Home Phone   [phone]    MRN   [id number]       [employer]ybeterian   None    Marital Status   Single                            Admission Date   4/17/24    Admission Type   Elective    Admitting Provider   Dallin Quinn MD    Attending Provider       Department, Room/Bed   [facility] 2A ICU, N211/1       Discharge Date   4/18/2024    Discharge Disposition   Home or Self Care    Discharge Destination                                 Attending Provider: (none)   Allergies: Percocet [Oxycodone-acetaminophen], Lortab [Hydrocodone-acetaminophen], Darvon [Propoxyphene]    Isolation: None   Infection: None   Code Status: CPR    Ht: 160 cm (63\")   Wt: 45.8 kg (101 lb)    Admission Cmt: None   Principal Problem: Carotid stenosis, asymptomatic, right [I65.21]                   Active Insurance as of 4/17/2024       Primary Coverage       Payor Plan Insurance Group Employer/Plan Group    AETNA MEDICARE REPLACEMENT AETNA MEDICARE REPLACEMENT [id number]       Payor Plan Address Payor Plan Phone Number Payor Plan Fax Number Effective Dates    [address] [phone]  1/1/2024 - None Entered    [facility] [id number]         Subscriber Name Subscriber Birth Date Member ID       SHANNON BECERRA 1960 [id number]                     Emergency Contacts        (Rel.) Home Phone Work Phone Mobile Phone    LULUDAKOTAH (Sister) [phone] -- [phone]    gracia olivares (Friend) [phone] -- [phone]    Nitza Fernando (Sister) -- -- [phone]                 Operative/Procedure Notes (all)        Dallin Quinn " MD ALFREDA at 04/17/24 1425  Version 1 of 1         Operative Report    Preop Diagnosis: Previous femoral endarterectomy.  Previous left carotid endarterectomy.  Now with significant 80% right carotid stenosis      Postoperative Diagnosis: Same      Procedure: Right carotid endarterectomy with pericardial patch closure and EEG monitoring        Surgeons: Dallin Quinn MD      Assistant: Marysol Sotelo PA-C    The Assistant provided services of suctioning, irrigation and retraction.  Also, assisted in suture closure of parts of the skin incision.      Indication: This is a patient fairly well-known to me on whom I performed previous left-sided carotid endarterectomy.  I also performed a previous femoral endarterectomy.  She was now referred to me back specifically to see right-sided carotid artery stenosis.  She understood that surgery carries with the risk of stroke bleeding infection death and agreed to proceed        Description: Supine position.  Sterile prep and drape.  Antibiotics given.  General endotracheal anesthesia.  An incision made on the right neck anterior to the sternocleidomastoid and through the platysma.  Care was taken to identify not injure the hypoglossal and vagus nerves.  Heparin was given and then the internal, external, and common carotid arteries were sequentially clamped with no deleterious EEG changes encountered.  The artery was opened with a heavy calcific plaque which was endarterectomized to a smooth surface.  Brisk backbleeding was noted from the internal carotid.  A pericardial patch was sutured over the arteriotomy with 6-0 Prolene suture and the graft was flushed free of air and debris.  The clamps were released in the proper sequential fashion and again no deleterious EEG changes were encountered.  Protamine was given to reverse the heparin the incision was irrigated with antibiotic solution and a small Fawad-Scott drain placed the incision closed with multiple layers of suture  and the sponge and needle count reported as correct estimated blood loss less than 75 mL there was no apparent early complications      Please note that portions of this note were completed with a voice recognition program. Efforts were made to edit the dictations, but occasionally words are mistranscribed.    Electronically signed by Dallin Quinn MD at 04/17/24 1520          Physician Progress Notes (most recent note)        Robert Rockwell DO at 04/18/24 0932            Intensive Care Follow-up     Hospital:  LOS: 1 day   Ms. Shannon Chan, 63 y.o. female is followed for:   Carotid stenosis, asymptomatic, right     Subjective       History of present illness:   Shannon Chan is a 63 year-old female with carotid disease s/p left CEA (2019), PVD s/p femoral endarterectomy (2022) on Xarelto, ongoing tobacco abuse, CAD s/p stents x 3  (2016), emphysema, HTN, dyslipidemia, ETOH use (21 beers / week), and GERD that presents to [facility] ICU postoperatively from scheduled right CEA with Dr. Quinn today.      Patient was referred back to Dr. Quinn after CTA demonstrated 80-85% stenosis of right carotid stenosis and he recommended surgical intervention. Patient denies symptoms. Last dose of Xarelto 4/15/24. Of note, following right femoral endarterectomy in 2022, patient experienced wound dehiscence with subsequent E. Coli infection in the wound bed requiring inpatient treatment with IV antibiotics directed by ID.       Subjective   Interval History:  Patient doing well this morning, no significant pain.  Would like to go home.             The patient's past medical, surgical and social history were reviewed and updated in Epic as appropriate.    Objective   Objective     Infusions:  lactated ringers, 9 mL/hr, Last Rate: 9 mL/hr (04/17/24 1413)  niCARdipine, 5-15 mg/hr, Last Rate: Stopped (04/17/24 1823)  phenylephrine, 0.5-3 mcg/kg/min, Last Rate: Stopped (04/17/24 1824)  sodium chloride, 30  mL/hr      Medications:  amLODIPine, 10 mg, Oral, Daily  clopidogrel, 75 mg, Oral, Daily  nicotine, 1 patch, Transdermal, Q24H  pantoprazole, 40 mg, Oral, Daily  senna-docusate sodium, 2 tablet, Oral, BID   And  polyethylene glycol, 17 g, Oral, Daily  rosuvastatin, 20 mg, Oral, Daily  sodium chloride, 10 mL, Intravenous, Q12H  sodium chloride, 3 mL, Intravenous, Q12H      I reviewed the patient's medications.    Vital Sign Min/Max for last 24 hours  Temp  Min: 97.4 °F (36.3 °C)  Max: 99.2 °F (37.3 °C)   BP  Min: 60/37  Max: 130/87   Pulse  Min: 71  Max: 118   Resp  Min: 14  Max: 18   SpO2  Min: 72 %  Max: 98 %   No data recorded       Input/Output for last 24 hour shift  04/17 0701 - 04/18 0700  In: 950 [I.V.:750]  Out: 1630 [Urine:1550; Drains:80]   S RR:  [5-11] 5  GENERAL : NAD, conversant  RESPIRATORY/THORAX : normal respiratory effort and no intercostal retractions, CTAB  CARDIOVASCULAR : Normal S1/S2, RRR. no lower ext edema.  GASTROINTESTINAL : Soft, NT/ND. BS x 4 normoactive. No hepatosplenomegaly.  MUSCULOSKELETAL : No cyanosis, clubbing, or ischemia  NEUROLOGICAL: alert and oriented to person, place and time  PSYCHOLOGICAL : Appropriate affect    Results from last 7 days   Lab Units 04/18/24  0420 04/16/24  0929   WBC 10*3/mm3 19.68* 13.37*   HEMOGLOBIN g/dL 13.7 15.5   PLATELETS 10*3/mm3 247 266     Results from last 7 days   Lab Units 04/18/24  0420 04/16/24  0929   SODIUM mmol/L 136 135*   POTASSIUM mmol/L 4.0 4.0   CO2 mmol/L 21.0* 24.0   BUN mg/dL 7* 10   CREATININE mg/dL 0.75 0.83   MAGNESIUM mg/dL 1.6  --    PHOSPHORUS mg/dL 3.6  --    GLUCOSE mg/dL 135* 109*     Estimated Creatinine Clearance: 56.2 mL/min (by C-G formula based on SCr of 0.75 mg/dL).          I reviewed the patient's new clinical results.  I reviewed the patient's new imaging results/reports including actual images and agree with reports.         Assessment & Plan   Impression        Carotid stenosis, asymptomatic, right     Essential hypertension    PAD (peripheral artery disease)    Centrilobular emphysema    Carotid stenosis, right    Tobacco use       Plan        Shannon Chan is a 63 year-old female with carotid disease s/p left CEA (2019), PVD s/p femoral endarterectomy (2022) on Xarelto, ongoing tobacco abuse, CAD s/p stents x 3  (2016), emphysema, HTN, dyslipidemia, ETOH use (21 beers / week), and GERD that presents to [facility] ICU postoperatively from scheduled right CEA with Dr. Albarran on 4/18/2024.    -Postop orders per surgery  -Continue home antihypertensive medications  -statin/Plavix  -Ensure adequate pain control  -Mobilize patient per protocol  -Aggressive pulmonary toilet  -SCDs for DVT prophylaxis  -Medically okay to be downgraded to the floor or discharged    I conducted multidisciplinary rounds in the plan of care was discussed with the multidisciplinary team at that time. In attendance at multidisciplinary rounds was clinical pharmacist, dietitian, nursing staff, and case management.    I discussed the patient's findings and my recommendations with patient and nursing staff       Marcellus Rockwell DO  Pulmonary, Critical care and Sleep Medicine         Electronically signed by Robert Rockwell DO at 04/18/24 0938       Consult Notes (most recent note)    No notes of this type exist for this encounter.       Discharge Summary    No notes of this type exist for this encounter.       Discharge Order (From admission, onward)       Start     Ordered    04/18/24 1351  Discharge patient  Once        Expected Discharge Date: 04/18/24   Discharge Disposition: Home or Self Care   Physician of Record for Attribution - Please select from Treatment Team: APOLONIA ALBARRAN [[id number]]   Review needed by CMO to determine Physician of Record: No      Question Answer Comment   Physician of Record for Attribution - Please select from Treatment Team APOLONIA ALBARRAN    Review needed by CMO to determine Physician of Record No         04/18/24 7603

## 2024-04-18 NOTE — PROGRESS NOTES
Malnutrition Severity Assessment    Patient Name:  Shannon Chan  YOB: 1960  MRN: [id number]  Admit Date:  4/17/2024    Patient meets criteria for : Severe Malnutrition    Comments: severe muscle and fat wasting on exam due to chronic condition.     Malnutrition Severity Assessment  Malnutrition Type: Chronic Disease - Related Malnutrition  Malnutrition Type (Last 8 Hours)       Malnutrition Severity Assessment       Row Name 04/18/24 1404       Malnutrition Severity Assessment    Malnutrition Type Chronic Disease - Related Malnutrition      Row Name 04/18/24 1404       Muscle Loss    Loss of Muscle Mass Findings Severe    [place] Region Severe - deep hollowing/scooping, lack of muscle to touch, facial bones well defined    Clavicle Bone Region Severe - protruding prominent bone    Acromion Bone Region Severe - squared shoulders, bones, and acromion process protrusion prominent    Scapular Bone Region Severe - prominent bones, depressions easily visible between ribs, scapula, spine, shoulders    Dorsal Hand Region Severe - prominent depression    Patellar Region Severe - prominent bone, square looking, very little muscle definition    Posterior Calf Region Severe - thin with very little definition/firmness      Row Name 04/18/24 1404       Fat Loss    Subcutaneous Fat Loss Findings Severe    Orbital Region  Severe - pronounced hollowness/depression, dark circles, loose saggy skin    Upper Arm Region Severe - mostly skin, very little space between folds, fingers touch    Thoracic & Lumbar Region Severe - ribs visible with prominent depressions, iliac crest very prominent      Row Name 04/18/24 1404       Criteria Met (Must meet criteria for severity in at least 2 of these categories: M Wasting, Fat Loss, Fluid, Secondary Signs, Wt. Status, Intake)    Patient meets criteria for  Severe Malnutrition                    Electronically signed by:  Bozena Cabrera RD  04/18/24 14:05 EDT

## 2024-04-18 NOTE — PROGRESS NOTES
"                  Clinical Nutrition   Nutrition Support Assessment  Reason for Visit: Identified at risk by screening criteria, MST score 2+, BMI    Patient Name: Shannon Chan  YOB: 1960  MRN: [id number]  Date of Encounter: 04/18/24 13:32 EDT  Admission date: 4/17/2024    Comments:    Meets MSA quinteros to chronic conditions  Complains of inability to swallow solid food, dicussed with RN.  Might needs swallow eval prior to D//C     Nutrition Assessment   Admission Diagnosis:  Carotid stenosis, asymptomatic, right [I65.21]  Carotid stenosis, right [I65.21]      Problem List:    Carotid stenosis, asymptomatic, right    Essential hypertension    PAD (peripheral artery disease)    Centrilobular emphysema    Carotid stenosis, right    Tobacco use        PMH:   She  has a past medical history of Anxiety, Arthritis, Coronary artery disease, Depression, Dyslipidemia, Dysphagia, GERD (gastroesophageal reflux disease), Hyperlipidemia, Hypertension, Myocardial infarction, Peripheral vascular disease, Wears dentures, and Wears glasses.    PSH:  She  has a past surgical history that includes Coronary stent placement (2016); Cardiac catheterization (2016); Knee surgery (Left); Tubal ligation; Carotid Endarterectomy (Left, 4/2/2019); Aortagram (N/A, 3/8/2022); Aortagram (N/A, 4/29/2022); Femoral Endarterectomy (N/A, 4/29/2022); and Carotid Endarterectomy (Right, 4/17/2024).    Applicable Nutrition Concerns:   Skin:  Oral:  GI:    Applicable Interval History:       Reported/Observed/Food/Nutrition Related History:   4/18  Patient reports weight stable x 2 years or longer.  Reports she lost weight a while back and has not been able to gain it back.  Reports issus with swallowing solids the pats couple of months.  Reports she vomits everytime she tries to eat solid and she has been \"living on ice cream.\" She has not discussed this with her PCP and she has not had an SLP eval before.  Discussed with RN, plan for SLP " "order     Anthropometrics     Height: Height: 160 cm (63\")  Last Filed Weight: Weight: 45.8 kg (101 lb) (04/17/24 1228)  Method: Weight Method: Stated  BMI: BMI (Calculated): 17.9    UBW:  100lb  Weight change: no recent weight changes       Nutrition Focused Physical Exam     Date:     4/16    Patient meets criteria for malnutrition diagnosis, see MSA note. Meets criteria for MSA due to chronic condition.      Current Nutrition Prescription   PO: Diet: Regular/House; No Straw; Texture: Regular (IDDSI 7); Fluid Consistency: Thin (IDDSI 0)  Oral Nutrition Supplement:   Intake: Insufficient data      Nutrition Diagnosis   Date:  4/18            Updated:    Problem Malnutrition    Etiology Chronic condition    Signs/Symptoms Muscle and fat wasting on exam    Status:     Date:     4/18  Updated:     Problem Swallowing difficulty   Etiology Reported per patient    Signs/Symptoms Report difficulty swallowing solid food    Status:     Goal:   Nutrition to support treatment and Establish PO      Nutrition Intervention      Follow treatment progress, Care plan reviewed, Interview for preferences    Meets MSA quinteros to chronic conditions  Complains of inability to swallow solid food, dicussed with RN.  Might needs swallow eval prior to D//C     Monitoring/Evaluation:   Per protocol, I&O, PO intake, Supplement intake, Pertinent labs, Swallow function      Bozena Cabrera RD  Time Spent: 30min    "

## 2024-04-18 NOTE — MBS/VFSS/FEES
Acute Care - Speech Language Pathology   Swallow Initial Evaluation [facility]  Clinical Swallow Evaluation  + Fiberoptic Endoscopic Evaluation of Swallowing (FEES)       Patient Name: Shannon Chan  : 1960  MRN: [id number]  Today's Date: 2024               Admit Date: 2024    Visit Dx:     ICD-10-CM ICD-9-CM   1. Pharyngeal dysphagia  R13.13 787.23   2. Carotid stenosis, asymptomatic, right  I65.21 433.10   3. Extensive PAD (peripheral artery disease) ([facility])  I73.9 443.9     Patient Active Problem List   Diagnosis    Essential hypertension    CAD without angina pectoris with previous stents    Dyslipidemia    Mucopurulent chronic bronchitis    Carotid stenosis, left s/p left CEA     PAD (peripheral artery disease)    Carotid stenosis, asymptomatic, right    Alcohol use    Centrilobular emphysema    Superior mesenteric artery stenosis    Femoral artery occlusion, right    S/P endarterectomy of right external iliac, right common femoral, proximal superficial femoral, and profundus femoral artery 2022    Rupture of operation wound    Wound dehiscence    Moderate malnutrition    Carotid stenosis, right    Tobacco use     Past Medical History:   Diagnosis Date    Anxiety     Arthritis     Coronary artery disease     s/p stent in     Depression     Dyslipidemia     Dysphagia     GERD (gastroesophageal reflux disease)     Hyperlipidemia     Hypertension     Myocardial infarction     age 29    Peripheral vascular disease     Wears dentures     UPPER PLATE     Wears glasses      Past Surgical History:   Procedure Laterality Date    AORTAGRAM N/A 3/8/2022    Procedure: AORTAGRAM WITH RUNOFFS;  Surgeon: Dallin Quinn MD;  Location: [facility];  Service: Vascular;  Laterality: N/A;  FLURO-1 MIN 42 SEC    DOSE- 99.36mGy    VISI-60ML    AORTAGRAM N/A 2022    Procedure: ARTERIOGRAM OF RIGHT FERMORAL ARTERY WITH RUNOFFS AND PROFUNDAPLASTY;  Surgeon: Dallin Quinn MD;   Location:  [facility];  Service: Vascular;  Laterality: N/A;  FL:  Dose:  Contrast:      CARDIAC CATHETERIZATION  2016    [place]    CAROTID ENDARTERECTOMY Left 4/2/2019    Procedure: CAROTID ENDARTERECTOMY WITH EEG LEFT;  Surgeon: Dallin Quinn MD;  Location:  [facility];  Service: Vascular    CAROTID ENDARTERECTOMY Right 4/17/2024    Procedure: CAROTID ENDARTERECTOMY WITH EEG RIGHT;  Surgeon: Dallin Quinn MD;  Location:  [facility];  Service: Vascular;  Laterality: Right;    CORONARY STENT PLACEMENT  2016    LADx2,Circx1 [place]    FEMORAL ENDARTERECTOMY N/A 4/29/2022    Procedure: FEMORAL ENDARTERECTOMY;  Surgeon: Dallin Quinn MD;  Location:  [facility];  Service: Vascular;  Laterality: N/A;  FL: 26 SEC  DOSE: 0.6 mGY       KNEE SURGERY Left     fracture repair    TUBAL ABDOMINAL LIGATION         SLP Recommendation and Plan  SLP Swallowing Diagnosis: mild, pharyngeal dysphagia (04/18/24 1045)  SLP Diet Recommendation: regular textures, thin liquids (no straws) (04/18/24 1045)  Recommended Precautions and Strategies: upright posture during/after eating, small bites of food and sips of liquid, no straw, multiple swallows per bite of food, multiple swallows per sip of liquid, general aspiration precautions, fatigue precautions (04/18/24 1045)  SLP Rec. for Method of Medication Administration: meds crushed, with puree, as tolerated (04/18/24 1045)     Monitor for Signs of Aspiration: yes, notify SLP if any concerns (04/18/24 1045)     Swallow Criteria for Skilled Therapeutic Interventions Met: demonstrates skilled criteria (04/18/24 1045)  Anticipated Discharge Disposition (SLP): home with home health (04/18/24 1045)  Rehab Potential/Prognosis, Swallowing: good, to achieve stated therapy goals (04/18/24 1045)  Therapy Frequency (Swallow): 5 days per week (04/18/24 1045)  Predicted Duration Therapy Intervention (Days): until discharge (04/18/24 1045)  Oral Care Recommendations: Oral Care  BID/PRN, Toothbrush (04/18/24 1045)  Demonstrates Need for Referral to Another Service: speech therapy (04/18/24 1045)          SWALLOW EVALUATION (Last 72 Hours)       SLP Adult Swallow Evaluation       Row Name 04/18/24 1045       Rehab Evaluation    Document Type evaluation  -CJ    Subjective Information no complaints  -CJ    Patient Observations alert;cooperative  -CJ    Patient/Family/Caregiver Comments/Observations no family present  -CJ    Patient Effort good  -CJ    Symptoms Noted During/After Treatment none  -CJ       General Information    Patient Profile Reviewed yes  -CJ    Pertinent History Of Current Problem Pt adm for carotid stenosis s/p R CEA; sig h/o L CEA in 2019, bronchitis, PAD, emphysema, HTN. Consulted 2/2 concerns for diffiuclty swallowing  -CJ    Current Method of Nutrition regular textures;thin liquids  -CJ    Precautions/Limitations, Vision WFL;for purposes of eval  -CJ    Precautions/Limitations, Hearing WFL;for purposes of eval  -CJ    Prior Level of Function-Communication WFL  -CJ    Prior Level of Function-Swallowing other (see comments)  difficulty reported at home  -CJ    Plans/Goals Discussed with patient  -CJ    Barriers to Rehab none identified  -CJ    Patient's Goals for Discharge eat/drink without coughing/choking  -CJ       Pain    Additional Documentation Pain Scale: FACES Pre/Post-Treatment (Group)  -CJ       Pain Scale: FACES Pre/Post-Treatment    Pain: FACES Scale, Pretreatment 0-->no hurt  -CJ    Posttreatment Pain Rating 0-->no hurt  -CJ       Oral Motor Structure and Function    Dentition Assessment natural, present and adequate  -CJ    Secretion Management WNL/WFL  -CJ    Mucosal Quality moist, healthy  -CJ       Oral Musculature and Cranial Nerve Assessment    Oral Motor General Assessment WFL  -CJ       General Eating/Swallowing Observations    Respiratory Support Currently in Use room air  -CJ    Eating/Swallowing Skills self-fed  -CJ    Positioning During Eating  "upright in bed  -CJ    Utensils Used cup;spoon;straw  -CJ    Consistencies Trialed pureed;ice chips;thin liquids  -CJ       Clinical Swallow Eval    Pharyngeal Phase suspected pharyngeal impairment  -    Clinical Swallow Evaluation Summary Pt reports significant difficulty w/ swallowing larger things such as pills and having to \"cough it back up\". Throat clear/multiple swallows intermittently throughout. Recommend plan for FEES prior to d/c  -CJ       Pharyngeal Phase Concerns    Pharyngeal Phase Concerns multiple swallows  -       Fiberoptic Endoscopic Evaluation of Swallowing (FEES)    Risks/Benefits Reviewed risks/benefits explained;patient;agreed to eval  -CJ    Nasal Entry right:  -    Scope serial number/identification [id number]  -       Anatomy and Physiology    Anatomic Considerations edema;anatomic deviation observed (see comments)  -    Comment some mild diffuse edema; prominent posterior pharyngeal wall  -CJ    Velopharyngeal WFL  -CJ    Base of Tongue symmetrical  -CJ    Epiglottis WFL  -    Laryngeal Function Breathing symmetrical  -CJ    Laryngeal Function Phonation symmetrical  -CJ    Laryngeal Function to Breath Hold TVF contact;sustained closure  -    Secretion Rating Scale (Shay et al. 1996) 1- secretions present around the laryngeal vestibule  -CJ    Secretion Description thin;clear  -CJ    Ice Chips DNA  -CJ    Spontaneous Swallow frequency adequate  multiple  -CJ    Sensory sensed scope  -CJ    Utensils Used Spoon;Cup;Straw  -CJ    Consistencies Trialed thin liquids;pudding/puree;regular textures;spoon;straw;cup;mixed consistency  -CJ       FEES Interpretation    Oral Phase WFL  -CJ       Initiation of Pharyngeal Swallow    Initiation of Pharyngeal Swallow bolus in pyriform sinuses  -CJ    Pharyngeal Phase impaired pharyngeal phase of swallowing  -CJ    Aspiration During the Swallow thin liquids;secondary to delayed swallow initiation or mistiming;secondary to reduced laryngeal " elevation;secondary to reduced vestibular closure  via straw only  -CJ    Response to Aspiration No  -CJ    No spontaneous response to aspiration with non-effective subglottic clearance with cue (see comments)  -CJ    Residue all consistencies tested;diffuse within pharynx;secondary to reduced posterior pharyngeal wall stripping;secondary to reduced laryngeal elevation;secondary to reduced hyolaryngeal excursion;other (see comments)  -CJ    Response to Residue partial residue clearance;with spontaneous subsequent swallow;with cued swallow  multiple swallows  -CJ    Attempted Compensatory Maneuvers bolus size;bolus presentation style;additional subsequent swallow;multiple swallows;throat clear after swallow  -CJ    Response to Attempted Compensatory Maneuvers prevented aspiration  -CJ    Successful Compensatory Maneuver Competency patient able to;demonstrate compensations  -CJ    FEES Summary Mild pharyngeal dysphagia. Noted prominent posterior pharyngeal wall also some mild diffuse edema. Silent aspiration w/ thin liquids via straw only. Cued cough was ineffective to clear aspirated material. Diffuse residue w/ all consistencies, pt noted w/ multiple swallows in response to be able to clear and not impact safety of swallowing fnx. No other episodes of laryngeal penetration or aspiration. Recommend regular diet w/ thin liquids ,no straws, meds crushed in puree/pudding. Dysphgaia tx upon discharge  -       SLP Evaluation Clinical Impression    SLP Swallowing Diagnosis mild;pharyngeal dysphagia  -    Functional Impact risk of aspiration/pneumonia  -    Rehab Potential/Prognosis, Swallowing good, to achieve stated therapy goals  -    Swallow Criteria for Skilled Therapeutic Interventions Met demonstrates skilled criteria  -       Recommendations    Therapy Frequency (Swallow) 5 days per week  -    Predicted Duration Therapy Intervention (Days) until discharge  -    SLP Diet Recommendation regular  textures;thin liquids  no straws  -    Recommended Precautions and Strategies upright posture during/after eating;small bites of food and sips of liquid;no straw;multiple swallows per bite of food;multiple swallows per sip of liquid;general aspiration precautions;fatigue precautions  -    Oral Care Recommendations Oral Care BID/PRN;Toothbrush  -    SLP Rec. for Method of Medication Administration meds crushed;with puree;as tolerated  -    Monitor for Signs of Aspiration yes;notify SLP if any concerns  -    Anticipated Discharge Disposition (SLP) home with home health  -    Demonstrates Need for Referral to Another Service speech therapy  -              User Key  (r) = Recorded By, (t) = Taken By, (c) = Cosigned By      Initials Name Effective Dates    Nadine Finch MS CCC-SLP 07/11/23 -                     EDUCATION  The patient has been educated in the following areas:   Dysphagia (Swallowing Impairment) Oral Care/Hydration.              Time Calculation:    Time Calculation- SLP       Row Name 04/18/24 1538             Time Calculation- SLP    SLP Start Time 1045  -      SLP Received On 04/18/24  -         Untimed Charges    65908-WE Eval Oral Pharyng Swallow Minutes 40  -      31526-UC Fiberoptic Endo Eval Swallow Minutes 85  -         Total Minutes    Untimed Charges Total Minutes 125  -       Total Minutes 125  -CJ                User Key  (r) = Recorded By, (t) = Taken By, (c) = Cosigned By      Initials Name Provider Type    Nadine Finch MS CCC-SLP Speech and Language Pathologist                    Therapy Charges for Today       Code Description Service Date Service Provider Modifiers Qty    30909553177 HC ST EVAL ORAL PHARYNG SWALLOW 3 4/18/2024 Nadine Adams MS CCC-SLP GN 1    87295206763 HC ST FIBEROPTIC ENDO EVAL SWALL 6 4/18/2024 Nadine Adams MS CCC-SLP GN 1                 Nadine Adams MS CCC-SLP  4/18/2024

## 2024-04-18 NOTE — CASE MANAGEMENT/SOCIAL WORK
Discharge Planning Assessment  [facility]     Patient Name: Shannon Chan  MRN: [id number]  Today's Date: 4/18/2024    Admit Date: 4/17/2024    Plan: Home   Discharge Needs Assessment       Row Name 04/18/24 0906       Living Environment    People in Home alone    Current Living Arrangements apartment    Potentially Unsafe Housing Conditions none    Primary Care Provided by self    Provides Primary Care For no one    Family Caregiver if Needed sibling(s)    Family Caregiver Names Gissel Murillo (sister) [phone]    Able to Return to Prior Arrangements yes       Resource/Environmental Concerns    Resource/Environmental Concerns none    Transportation Concerns none       Transition Planning    Patient/Family Anticipates Transition to home    Patient/Family Anticipated Services at Transition none    Transportation Anticipated family or friend will provide       Discharge Needs Assessment    Readmission Within the Last 30 Days no previous admission in last 30 days    Equipment Currently Used at Home cane, straight    Concerns to be Addressed denies needs/concerns at this time    Anticipated Changes Related to Illness none    Equipment Needed After Discharge none                   Discharge Plan       Row Name 04/18/24 0907       Plan    Plan Home    Patient/Family in Agreement with Plan yes    Plan Comments Spoke with patient at bedside. Lives alone in [facility]. Contact is Gissel Murillo (sister) [phone]. Is independent with ADL's. No problems with [place] Medicare or medications. Uses a straight cane. Has advanced directives. PCP is Zachary Shepherd MD. Plan is home. Sister will transport. CM will continue to follow.    Final Discharge Disposition Code 01 - home or self-care                  Continued Care and Services - Admitted Since 4/17/2024    No active coordination exists for this encounter.       Expected Discharge Date and Time       Expected Discharge Date Expected Discharge Time    Apr 20, 2024             Demographic Summary       Row Name 04/18/24 0905       General Information    Admission Type inpatient    Arrived From PACU/recovery room    Referral Source admission list    Reason for Consult discharge planning    Preferred Language English       Contact Information    Permission Granted to Share Info With     Contact Information Obtained for                    Functional Status       Row Name 04/18/24 0906       Functional Status    Usual Activity Tolerance good    Current Activity Tolerance good       Functional Status, IADL    Medications independent    Meal Preparation independent    Housekeeping independent    Laundry independent    Shopping independent       Mental Status    General Appearance WDL WDL       Mental Status Summary    Recent Changes in Mental Status/Cognitive Functioning no changes       Employment/    Employment Status disabled                   Psychosocial    No documentation.                  Abuse/Neglect    No documentation.                  Legal    No documentation.                  Substance Abuse    No documentation.                  Patient Forms    No documentation.                     Nelson Kraus, RN

## 2024-04-18 NOTE — PROGRESS NOTES
[facility] Cardiothoracic Surgery In-Patient Progress Note     POD # 1 s/p right CEA     LOS: 1 day       Subjective  No complaints. Off of cardene.      Objective  Vital Signs  Temp:  [97.4 °F (36.3 °C)-99.2 °F (37.3 °C)] 98.2 °F (36.8 °C)  Heart Rate:  [] 93  Resp:  [14-18] 18  BP: ()/(26-90) 93/74        Physical Exam:   General Appearance: alert, appears stated age and cooperative   Neuro: Tongue midline, voice normal   Lungs: clear to auscultation, respirations regular, respirations even, and respirations unlabored   Heart: regular rhythm & normal rate, normal S1, S2, no murmur, no gallop, no rub, and no click   Skin: Incision c/d/I  Output by Drain (mL) 04/17/24 0701 - 04/17/24 1900 04/17/24 1901 - 04/18/24 0700 04/18/24 0701 - 04/18/24 0729 Range Total   Closed/Suction Drain Right;Ventral Neck Bulb 15 Fr. 10 70  80        Results     Results from last 7 days   Lab Units 04/18/24  0420   WBC 10*3/mm3 19.68*   HEMOGLOBIN g/dL 13.7   HEMATOCRIT % 39.6   PLATELETS 10*3/mm3 247     Results from last 7 days   Lab Units 04/18/24  0420   SODIUM mmol/L 136   POTASSIUM mmol/L 4.0   CHLORIDE mmol/L 99   CO2 mmol/L 21.0*   BUN mg/dL 7*   CREATININE mg/dL 0.75   GLUCOSE mg/dL 135*   CALCIUM mg/dL 8.8     Assessment  POD # 1 s/p right CEA      Plan   D/C SHAZIA drain and art line  D/C home today if ambulating well  Restart home Xarelto on Sunday 4/21      DENIS Reddy  04/18/24  07:28 EDT

## 2024-04-18 NOTE — PAYOR COMM NOTE
"Shannon Becerra \"Vicky\" (63 y.o. Female)       Date of Birth   1960    Social Security Number       Address   [address] [id number]    Home Phone   [phone]    MRN   [id number]       Roman Catholic   None    Marital Status   Single                            Admission Date   4/17/24    Admission Type   Elective    Admitting Provider   Dallin Quinn MD    Attending Provider   Dallin Quinn MD    Department, Room/Bed   [facility] 2A ICU, N211/1       Discharge Date       Discharge Disposition       Discharge Destination                                 Attending Provider: Dallin Quinn MD    Allergies: Percocet [Oxycodone-acetaminophen], Lortab [Hydrocodone-acetaminophen], Darvon [Propoxyphene]    Isolation: None   Infection: None   Code Status: CPR    Ht: 160 cm (63\")   Wt: 45.8 kg (101 lb)    Admission Cmt: None   Principal Problem: Carotid stenosis, asymptomatic, right [I65.21]                   Active Insurance as of 4/17/2024       Primary Coverage       Payor Plan Insurance Group Employer/Plan Group    AETNA MEDICARE REPLACEMENT AETNA MEDICARE REPLACEMENT [id number]       Payor Plan Address Payor Plan Phone Number Payor Plan Fax Number Effective Dates    [address] [phone]  1/1/2024 - None Entered    [facility] [id number]         Subscriber Name Subscriber Birth Date Member ID       SHANNON BECERRA 1960 [id number]                     Emergency Contacts        (Rel.) Home Phone Work Phone Mobile Phone    DAKOTAH LAWSON (Sister) [phone] -- [phone]    gracia olivares (Friend) [phone] -- [phone]    Nitza Fernando (Sister) -- -- [phone]              [place]: NPI [id number] Tax ID [id number]  Insurance Information                  AETNA MEDICARE REPLACEMENT/AETNA MEDICARE REPLACEMENT Phone: [phone]    Subscriber: Shannon Becerra Subscriber#: [id number]    Group#: [id number] Precert#: -- "          Current Facility-Administered Medications   Medication Dose Route Frequency Provider Last Rate Last Admin    amLODIPine (NORVASC) tablet 10 mg  10 mg Oral Daily Kiah Ryan APRN   10 mg at 04/18/24 0918    sennosides-docusate (PERICOLACE) 8.6-50 MG per tablet 2 tablet  2 tablet Oral BID Marysol Sotelo PA-C   2 tablet at 04/18/24 0917    And    polyethylene glycol (MIRALAX) packet 17 g  17 g Oral Daily Marysol Sotelo PA-C        And    bisacodyl (DULCOLAX) EC tablet 5 mg  5 mg Oral Daily PRN Marysol Sotelo PA-C        And    bisacodyl (DULCOLAX) suppository 10 mg  10 mg Rectal Daily PRN Marysol Sotelo PA-C        clopidogrel (PLAVIX) tablet 75 mg  75 mg Oral Daily Marysol Sotelo PA-C   75 mg at 04/18/24 0918    droperidol (INAPSINE) injection 0.625 mg  0.625 mg Intravenous Q15 Min PRN Seb Thomas CRNA        Or    droperidol (INAPSINE) injection 0.625 mg  0.625 mg Intramuscular Once PRN Seb Thomas CRNA        fentaNYL citrate (PF) (SUBLIMAZE) injection 50 mcg  50 mcg Intravenous Q5 Min PRN Seb Thomas CRNA        hydrALAZINE (APRESOLINE) injection 5 mg  5 mg Intravenous Q10 Min PRN Parveen Mcmanus APRN   5 mg at 04/18/24 0009    HYDROcodone-acetaminophen (NORCO) 5-325 MG per tablet 1 tablet  1 tablet Oral Once PRN Seb Thomas CRNA        HYDROcodone-acetaminophen (NORCO) 5-325 MG per tablet 1 tablet  1 tablet Oral Q6H PRN Dallin Quinn MD   1 tablet at 04/17/24 2131    HYDROmorphone (DILAUDID) injection 0.5 mg  0.5 mg Intravenous Q10 Min PRN Seb Thomas CRNA        ipratropium-albuterol (DUO-NEB) nebulizer solution 3 mL  3 mL Nebulization Once PRN Seb Thomas CRNA        ipratropium-albuterol (DUO-NEB) nebulizer solution 3 mL  3 mL Nebulization Q4H PRN Kiah Ryan, DENIS        labetalol (NORMODYNE,TRANDATE) injection 5 mg  5 mg Intravenous Q5 Min PRN Parveen Mcmanus APRN        lactated ringers bolus 250 mL  250 mL Intravenous Once PRN  Seb Thomas CRNA        lactated ringers infusion  9 mL/hr Intravenous Continuous Kiah Ryan APRN 9 mL/hr at 04/17/24 1413 Restarted at 04/17/24 1501    Morphine sulfate (PF) injection 4 mg  4 mg Intravenous Q3H PRN Dallin Quinn MD        naloxone (NARCAN) injection 0.4 mg  0.4 mg Intravenous PRN Seb Thomas CRNA        niCARdipine (CARDENE) 25mg in 250mL NS infusion  5-15 mg/hr Intravenous Titrated Marysol Sotelo PA-C   Held at 04/17/24 1823    nicotine (NICODERM CQ) 21 MG/24HR patch 1 patch  1 patch Transdermal Q24H Kiah Ryan APRN   1 patch at 04/17/24 1953    ondansetron (ZOFRAN) injection 4 mg  4 mg Intravenous Once PRN Seb Thomas CRNA        ondansetron ODT (ZOFRAN-ODT) disintegrating tablet 4 mg  4 mg Oral Q6H PRN Marysol Sotelo PA-C        Or    ondansetron (ZOFRAN) injection 4 mg  4 mg Intravenous Q6H PRN Marysol Sotelo PA-C   4 mg at 04/18/24 0416    pantoprazole (PROTONIX) EC tablet 40 mg  40 mg Oral Daily Kiah Ryan APRN   40 mg at 04/18/24 0918    phenylephrine (SAHRA-SYNEPHRINE) 50 mg in sodium chloride 0.9 % 250 mL infusion  0.5-3 mcg/kg/min Intravenous Titrated Kiah Ryan APRN   Held at 04/17/24 1824    promethazine (PHENERGAN) suppository 25 mg  25 mg Rectal Once PRN Seb Thomas CRNA        Or    promethazine (PHENERGAN) tablet 25 mg  25 mg Oral Once PRN Seb Thomas CRNA        rosuvastatin (CRESTOR) tablet 20 mg  20 mg Oral Daily Kiah Ryan APRN   20 mg at 04/18/24 0918    sodium chloride 0.45 % infusion  30 mL/hr Intravenous Continuous Marysol Sotelo PA-C        sodium chloride 0.9 % flush 10 mL  10 mL Intravenous Q12H Marysol Sotelo PA-C   10 mL at 04/18/24 0918    sodium chloride 0.9 % flush 10 mL  10 mL Intravenous PRN Marysol Sotelo PA-C        sodium chloride 0.9 % flush 3 mL  3 mL Intravenous Q12H Seb Thomas CRNA   3 mL at 04/18/24 0918    sodium chloride 0.9 % flush 3-10 mL  3-10 mL Intravenous PRN  Seb Thomas CRNA        sodium chloride 0.9 % infusion 40 mL  40 mL Intravenous PRN Seb Thomas CRNA        sodium chloride 0.9 % infusion 40 mL  40 mL Intravenous PRN Marysol Sotelo PA-C         Facility-Administered Medications Ordered in Other Encounters   Medication Dose Route Frequency Provider Last Rate Last Admin    Chlorhexidine Gluconate Cloth 2 % pads 1 Application  1 Application Topical Q12H PRN Sandra Hammond PA-C         Lab Results (last 24 hours)       Procedure Component Value Units Date/Time    Tissue Pathology Exam [[id number]] Collected: 04/17/24 1429    Specimen: Tissue from Carotid Plaque Updated: 04/18/24 0740    Basic Metabolic Panel [[id number]]  (Abnormal) Collected: 04/18/24 0420    Specimen: Blood Updated: 04/18/24 0446     Glucose 135 mg/dL      BUN 7 mg/dL      Creatinine 0.75 mg/dL      Sodium 136 mmol/L      Potassium 4.0 mmol/L      Chloride 99 mmol/L      CO2 21.0 mmol/L      Calcium 8.8 mg/dL      BUN/Creatinine Ratio 9.3     Anion Gap 16.0 mmol/L      eGFR 90.1 mL/min/1.73     Narrative:      GFR Normal >60  Chronic Kidney Disease <60  Kidney Failure <15      Magnesium [[id number]]  (Normal) Collected: 04/18/24 0420    Specimen: Blood Updated: 04/18/24 0446     Magnesium 1.6 mg/dL     Phosphorus [[id number]]  (Normal) Collected: 04/18/24 0420    Specimen: Blood Updated: 04/18/24 0446     Phosphorus 3.6 mg/dL     CBC & Differential [[id number]]  (Abnormal) Collected: 04/18/24 0420    Specimen: Blood Updated: 04/18/24 0430    Narrative:      The following orders were created for panel order CBC & Differential.  Procedure                               Abnormality         Status                     ---------                               -----------         ------                     CBC Auto Differential[[id number]]        Abnormal            Final result                 Please view results for these tests on the individual orders.    CBC Auto Differential [[id number]]   (Abnormal) Collected: 04/18/24 0420    Specimen: Blood Updated: 04/18/24 0430     WBC 19.68 10*3/mm3      RBC 4.60 10*6/mm3      Hemoglobin 13.7 g/dL      Hematocrit 39.6 %      MCV 86.1 fL      MCH 29.8 pg      MCHC 34.6 g/dL      RDW 12.8 %      RDW-SD 40.2 fl      MPV 10.0 fL      Platelets 247 10*3/mm3      Neutrophil % 83.0 %      Lymphocyte % 10.8 %      Monocyte % 5.4 %      Eosinophil % 0.0 %      Basophil % 0.3 %      Immature Grans % 0.5 %      Neutrophils, Absolute 16.35 10*3/mm3      Lymphocytes, Absolute 2.12 10*3/mm3      Monocytes, Absolute 1.06 10*3/mm3      Eosinophils, Absolute 0.00 10*3/mm3      Basophils, Absolute 0.05 10*3/mm3      Immature Grans, Absolute 0.10 10*3/mm3      nRBC 0.0 /100 WBC           Imaging Results (Last 24 Hours)       ** No results found for the last 24 hours. **          Orders (last 24 hrs)        Start     Ordered    04/18/24 0900  clopidogrel (PLAVIX) tablet 75 mg  Daily         04/17/24 1531    04/18/24 0900  amLODIPine (NORVASC) tablet 10 mg  Daily         04/17/24 1731 04/18/24 0900  pantoprazole (PROTONIX) EC tablet 40 mg  Daily         04/17/24 1731 04/18/24 0900  rosuvastatin (CRESTOR) tablet 20 mg  Daily         04/17/24 1731 04/18/24 0827  Remove Drains / Tubes  Once         04/18/24 0826    04/18/24 0827  Discontinue Arterial Line  Once         04/18/24 0826    04/18/24 0600  Basic Metabolic Panel  Morning Draw         04/17/24 1531    04/18/24 0600  CBC & Differential  Morning Draw         04/17/24 1531    04/18/24 0600  Magnesium  Morning Draw         04/17/24 1734    04/18/24 0600  Phosphorus  Morning Draw         04/17/24 1734 04/18/24 0600  CBC Auto Differential  PROCEDURE ONCE         04/17/24 2202 04/18/24 0003  labetalol (NORMODYNE,TRANDATE) injection 5 mg  Every 5 Minutes PRN         04/18/24 0003 04/18/24 0003  hydrALAZINE (APRESOLINE) injection 5 mg  Every 10 Minutes PRN         04/18/24 0003 04/17/24 2300  ceFAZolin 2000 mg IVPB  "in 100 mL NS (MBP)  Every 8 Hours         04/17/24 1531 04/17/24 2100  sodium chloride 0.9 % flush 3 mL  Every 12 Hours Scheduled         04/17/24 1700 04/17/24 2100  sodium chloride 0.9 % flush 10 mL  Every 12 Hours Scheduled         04/17/24 1531 04/17/24 2100  Famotidine (PF) (PEPCID) injection 20 mg  Every 12 Hours Scheduled,   Status:  Discontinued        Placed in \"Or\" Linked Group    04/17/24 1531 04/17/24 2100  famotidine (PEPCID) tablet 20 mg  Every 12 Hours Scheduled,   Status:  Discontinued        Placed in \"Or\" Linked Group    04/17/24 1531 04/17/24 2100  sennosides-docusate (PERICOLACE) 8.6-50 MG per tablet 2 tablet  2 Times Daily        Placed in \"And\" Linked Group    04/17/24 1531 04/17/24 2030  lidocaine (XYLOCAINE) 4 % nebulizer solution 4 mL  3 Times Daily - RT,   Status:  Discontinued         04/17/24 1531 04/17/24 1900  nicotine (NICODERM CQ) 21 MG/24HR patch 1 patch  Every 24 Hours         04/17/24 1725 04/17/24 1728  ipratropium-albuterol (DUO-NEB) nebulizer solution 3 mL  Every 4 Hours PRN         04/17/24 1728    04/17/24 1701  Oxygen Therapy- Nasal Cannula; 2 LPM  Continuous         04/17/24 1700    04/17/24 1701  Continuous Pulse Oximetry  Continuous         04/17/24 1700    04/17/24 1701  Vital signs every 5 minutes for 15 minutes, every 15 minutes thereafter.  Once         04/17/24 1700    04/17/24 1701  Call Anesthesiologist for additional IV Fluid bolus for Hypotension/Tachycardia  Continuous         04/17/24 1700    04/17/24 1701  Discontinue Arterial Line - Recovery RN  to discontinue arterial line if the patient is going to a non critical care floor.  Once        Comments: Recovery RN  to discontinue arterial line if the patient is going to a non critical care floor.    04/17/24 1700    04/17/24 1701  Notify Anesthesia of Any Acute Changes in Patient Condition  Until Discontinued         04/17/24 1700    04/17/24 1701  Notify Anesthesia for Unrelieved Pain  " "Until Discontinued         04/17/24 1700    04/17/24 1701  Insert Peripheral IV  Once         04/17/24 1700    04/17/24 1701  Saline Lock & Maintain IV Access  Continuous         04/17/24 1700 04/17/24 1701  Once DC criteria to floor met, follow surgeon's orders.  Until Discontinued         04/17/24 1700 04/17/24 1701  Discharge patient from PACU when discharge criteria is met.  Until Discontinued         04/17/24 1700 04/17/24 1700  naloxone (NARCAN) injection 0.4 mg  As Needed         04/17/24 1700 04/17/24 1700  ondansetron (ZOFRAN) injection 4 mg  Once As Needed         04/17/24 1700 04/17/24 1700  droperidol (INAPSINE) injection 0.625 mg  Every 15 Minutes PRN        Placed in \"Or\" Linked Group    04/17/24 1700    04/17/24 1700  droperidol (INAPSINE) injection 0.625 mg  Once As Needed        Placed in \"Or\" Linked Group    04/17/24 1700    04/17/24 1700  promethazine (PHENERGAN) suppository 25 mg  Once As Needed        Placed in \"Or\" Linked Group    04/17/24 1700    04/17/24 1700  promethazine (PHENERGAN) tablet 25 mg  Once As Needed        Placed in \"Or\" Linked Group    04/17/24 1700    04/17/24 1700  labetalol (NORMODYNE,TRANDATE) injection 5 mg  Every 5 Minutes PRN,   Status:  Discontinued         04/17/24 1700    04/17/24 1700  hydrALAZINE (APRESOLINE) injection 5 mg  Every 10 Minutes PRN,   Status:  Discontinued         04/17/24 1700    04/17/24 1700  ipratropium-albuterol (DUO-NEB) nebulizer solution 3 mL  Once As Needed         04/17/24 1700    04/17/24 1700  HYDROcodone-acetaminophen (NORCO) 5-325 MG per tablet 1 tablet  Once As Needed         04/17/24 1700 04/17/24 1700  sodium chloride 0.9 % flush 3-10 mL  As Needed         04/17/24 1700    04/17/24 1700  sodium chloride 0.9 % infusion 40 mL  As Needed         04/17/24 1700    04/17/24 1700  lactated ringers bolus 250 mL  Once As Needed         04/17/24 1700    04/17/24 1700  HYDROmorphone (DILAUDID) injection 0.5 mg  Every 10 Minutes " "PRN         04/17/24 1700    04/17/24 1700  fentaNYL citrate (PF) (SUBLIMAZE) injection 50 mcg  Every 5 Minutes PRN         04/17/24 1700    04/17/24 1600  Incentive Spirometry  Every 2 Hours While Awake       04/17/24 1531    04/17/24 1542  EEG Monitoring Nonintracranial Surgery  Once         04/17/24 1202    04/17/24 1533  sodium chloride 0.45 % infusion  Continuous         04/17/24 1531    04/17/24 1533  polyethylene glycol (MIRALAX) packet 17 g  Daily        Placed in \"And\" Linked Group    04/17/24 1531    04/17/24 1532  Morphine sulfate (PF) injection 4 mg  Every 3 Hours PRN         04/17/24 1532    04/17/24 1532  HYDROcodone-acetaminophen (NORCO) 5-325 MG per tablet 1 tablet  Every 6 Hours PRN         04/17/24 1532    04/17/24 1532  niCARdipine (CARDENE) 25mg in 250mL NS infusion  Titrated         04/17/24 1531    04/17/24 1531  bisacodyl (DULCOLAX) EC tablet 5 mg  Daily PRN        Placed in \"And\" Linked Group    04/17/24 1531    04/17/24 1531  bisacodyl (DULCOLAX) suppository 10 mg  Daily PRN        Placed in \"And\" Linked Group    04/17/24 1531    04/17/24 1531  ondansetron ODT (ZOFRAN-ODT) disintegrating tablet 4 mg  Every 6 Hours PRN        Placed in \"Or\" Linked Group    04/17/24 1531    04/17/24 1531  ondansetron (ZOFRAN) injection 4 mg  Every 6 Hours PRN        Placed in \"Or\" Linked Group    04/17/24 1531    04/17/24 1531  ondansetron ODT (ZOFRAN-ODT) disintegrating tablet 4 mg  Every 6 Hours PRN,   Status:  Discontinued         04/17/24 1531    04/17/24 1531  Neurological Measures  Continuous         04/17/24 1531    04/17/24 1531  Notify Physician  - As Specified  Until Discontinued        Comments: Notify Physician for:    SBP greater than 180 or less than 90.    HR greater than 120 or less than 60.    Urine output less than 30 mL per hour.    04/17/24 1531    04/17/24 1531  Notify Provider - Neurological Deterioration, New Headache, Acute Hypertension, Nausea or Vomiting  Until Discontinued         " 04/17/24 1531    04/17/24 1531  Notify Provider for surgical site swelling, bleeding, or hematoma  Until Discontinued         04/17/24 1531    04/17/24 1531  Notify Physician for access site swelling, bleeding, hematoma or change in peripheral pulses, temperature, color or sensation in extremity  Until Discontinued         04/17/24 1531    04/17/24 1531  Vital Signs and Surgical / Access Site Checks- As Specified  Continuous        Comments: Perform Vital Signs and Surgical / Access Site checks as specified.    For patients in ICU:    Q 15 minutes X 4  Q 30 minutes X 2  Q 1 hour X 4  Q 2 hours until discharged.      For patients not in ICU:    Q 15 minutes X 4  Q 30 minutes X 2  Q 1 hour X 4  Q 4 hours X 2 or until discharged.    04/17/24 1531    04/17/24 1531  Neuro Checks - As Specified  Until Discontinued        Comments: Perform Neuro checks as specified:    For patients in ICU:    Q 15 minutes X 4  Q 30 minutes X 2  Q 1 hour X 10  Q 2 hours until discharged.      For patients not in ICU:    Q 15 minutes X 4  Q 30 minutes X 2  Q 1 hour X 10  Q 4 hours X 2 or until discharged.    04/17/24 1531    04/17/24 1531  Peripheral Vascular Checks - As Specified  Until Discontinued        Comments: Perform Vital Signs as specified.    For patients in ICU:    Q 15 minutes X 4  Q 30 minutes X 2  Q 1 hour X 4  Q 2 hours until discharged.      For patients not in ICU:    Q 15 minutes X 4  Q 30 minutes X 2  Q 1 hour X 4  Q 4 hours X 2 or until discharged.    04/17/24 1531    04/17/24 1531  Continuous Cardiac Monitoring  Continuous        Comments: Follow Standing Orders As Outlined in Process Instructions (Open Order Report to View Full Instructions)    04/17/24 1531    04/17/24 1531  Maintain IV Access  Continuous,   Status:  Canceled         04/17/24 1531    04/17/24 1531  Telemetry - Place Orders & Notify Provider of Results When Patient Experiences Acute Chest Pain, Dysrhythmia or Respiratory Distress  Continuous         Comments: Open Order Report to View Parameters Requiring Provider Notification    04/17/24 1531    04/17/24 1531  Monitor Arterial Line  Every Shift       04/17/24 1531    04/17/24 1531  Provide Stroke Educational Material  Once        Comments: Provide and review Stroke Education Booklet with patient/family. Sign mutually agreed goals (last page) and scan into media.    04/17/24 1531    04/17/24 1531  Monitor Surgical Site Drain Output  Every Shift       04/17/24 1531    04/17/24 1531  Strict Bed Rest  Until Discontinued         04/17/24 1531    04/17/24 1531  Advance Diet As Tolerated -  Until Discontinued         04/17/24 1531    04/17/24 1531  Oxygen Therapy- Nasal Cannula; Titrate 1-6 LPM Per SpO2; 90 - 95%  Continuous,   Status:  Canceled         04/17/24 1531    04/17/24 1531  Continuous Pulse Oximetry  Continuous,   Status:  Canceled         04/17/24 1531    04/17/24 1531  Place Sequential Compression Device  Once         04/17/24 1531    04/17/24 1531  Maintain Sequential Compression Device  Continuous         04/17/24 1531    04/17/24 1531  Insert Peripheral IV  Once         04/17/24 1531    04/17/24 1531  Saline Lock & Maintain IV Access  Continuous,   Status:  Canceled         04/17/24 1531    04/17/24 1531  Code Status and Medical Interventions:  Continuous         04/17/24 1531    04/17/24 1531  Elevate HOB 30 Degrees  Continuous         04/17/24 1531    04/17/24 1531  Diet: Regular/House; Fluid Consistency: Thin (IDDSI 0)  Diet Effective Now         04/17/24 1531    04/17/24 1530  sodium chloride 0.9 % flush 10 mL  As Needed         04/17/24 1531    04/17/24 1530  sodium chloride 0.9 % infusion 40 mL  As Needed         04/17/24 1531    04/17/24 1429  Tissue Pathology Exam  RELEASE UPON ORDERING         04/17/24 1429    04/17/24 1428  sodium chloride 500 mL with povidone-iodine 45 mL, ceFAZolin 1 g, gentamicin 80 mg irrigation  As Needed,   Status:  Discontinued         04/17/24 1428    04/17/24 1428   sodium chloride 500 mL with heparin (porcine) 5,000 Units mixture  As Needed,   Status:  Discontinued         04/17/24 1428    04/17/24 1428  lidocaine (XYLOCAINE) 1 % injection  As Needed,   Status:  Discontinued         04/17/24 1429    04/17/24 1320  phenylephrine (ASHRA-SYNEPHRINE) 50 mg in sodium chloride 0.9 % 250 mL infusion  Titrated         04/17/24 1247    04/17/24 1320  niCARdipine (CARDENE) 25mg in 250mL NS infusion  Titrated,   Status:  Discontinued         04/17/24 1247    04/17/24 1313  Inpatient Admission  Once         04/17/24 1312    04/17/24 1204  sodium chloride 0.9 % flush 10 mL  Every 12 Hours Scheduled,   Status:  Discontinued         04/17/24 1202    04/17/24 1204  lactated ringers infusion  Continuous         04/17/24 1202    04/17/24 1204  famotidine (PEPCID) tablet 20 mg  Once         04/17/24 1202    04/17/24 1204  famotidine (PEPCID) injection 20 mg  Once,   Status:  Discontinued         04/17/24 1202    04/17/24 1204  ceFAZolin 2000 mg IVPB in 100 mL NS (MBP)  Once         04/17/24 1202    04/17/24 1203  Notify Anesthesiologist with any acute changes in patient's condition.  Continuous,   Status:  Canceled         04/17/24 1202    04/17/24 1203  Notify Anesthesiologist for unrelieved pain.  Continuous,   Status:  Canceled        Comments: Notify Anesthesiologist    04/17/24 1202    04/17/24 1203  Oxygen Therapy- Nasal Cannula; Titrate 1-6 LPM Per SpO2; 90 - 95%  Continuous,   Status:  Canceled         04/17/24 1202    04/17/24 1203  Continuous Pulse Oximetry  Continuous,   Status:  Canceled         04/17/24 1202    04/17/24 1203  POC Glucose Once  Once,   Status:  Canceled        Comments: For all diabetic patients. Notify Anesthesiologist for blood sugar > 180.      04/17/24 1202    04/17/24 1203  Insert Peripheral IV  Once,   Status:  Canceled         04/17/24 1202    04/17/24 1203  Saline Lock & Maintain IV Access  Continuous,   Status:  Canceled         04/17/24 1202    04/17/24 120   Potassium  Once,   Status:  Canceled        Comments: For all patients with renal disease within 7 days, ESRD day of, within 3 days if taking digoxin, potassium-depleting anti-hypertensives or diuretics within 7 days. Nursing: discontinue this order if not completed in pre-op; there is no need to act on this order once the patient is out of surgery.      04/17/24 1202    04/17/24 1203  ECG 12 Lead Pre-Op / Pre-Procedure  Once,   Status:  Canceled        Comments: If no ECG within the previous 6 months or any of the following: Heart/Valvular Disease; Previous Abnormal EKG; Diabetic; Renal Failure; Chest Pain; Hypertension; Emphysema/Respiratory Disease - Read by Anesthesiologist    04/17/24 1202 04/17/24 1203  May take Beta Blocker from home with sip of water.  Once,   Status:  Canceled        Comments: If patient currently taking Beta Blocker and has not taken within 24 hours    04/17/24 1202    04/17/24 1203  Follow Anesthesia Guidelines / Protocol  Once,   Status:  Canceled         04/17/24 1202    04/17/24 1203  EEG (Pre-Op)  Once         04/17/24 1202    04/17/24 1203  EEG (Intra-Op)  Once,   Status:  Canceled         04/17/24 1202    04/17/24 1202  midazolam (VERSED) injection 1 mg  Every 10 Minutes PRN,   Status:  Discontinued         04/17/24 1202    04/17/24 1202  Vital Signs - Per Anesthesia Protocol  As Needed,   Status:  Canceled       04/17/24 1202    04/17/24 1202  sodium chloride 0.9 % flush 10 mL  As Needed,   Status:  Discontinued         04/17/24 1202    04/17/24 1202  sodium chloride 0.9 % infusion 40 mL  As Needed,   Status:  Discontinued         04/17/24 1202    04/17/24 1202  lidocaine PF 1% (XYLOCAINE) injection 0.5 mL  Once As Needed         04/17/24 1202                     Physician Progress Notes (last 24 hours)        Robert Rockwell DO at 04/18/24 0966            Intensive Care Follow-up     Hospital:  LOS: 1 day   Ms. Shannon Chan, 63 y.o. female is followed for:    Carotid stenosis, asymptomatic, right     Subjective       History of present illness:   Shannon Chan is a 63 year-old female with carotid disease s/p left CEA (2019), PVD s/p femoral endarterectomy (2022) on Xarelto, ongoing tobacco abuse, CAD s/p stents x 3  (2016), emphysema, HTN, dyslipidemia, ETOH use (21 beers / week), and GERD that presents to [facility] ICU postoperatively from scheduled right CEA with Dr. Quinn today.      Patient was referred back to Dr. Quinn after CTA demonstrated 80-85% stenosis of right carotid stenosis and he recommended surgical intervention. Patient denies symptoms. Last dose of Xarelto 4/15/24. Of note, following right femoral endarterectomy in 2022, patient experienced wound dehiscence with subsequent E. Coli infection in the wound bed requiring inpatient treatment with IV antibiotics directed by ID.       Subjective   Interval History:  Patient doing well this morning, no significant pain.  Would like to go home.             The patient's past medical, surgical and social history were reviewed and updated in Epic as appropriate.    Objective   Objective     Infusions:  lactated ringers, 9 mL/hr, Last Rate: 9 mL/hr (04/17/24 1413)  niCARdipine, 5-15 mg/hr, Last Rate: Stopped (04/17/24 1823)  phenylephrine, 0.5-3 mcg/kg/min, Last Rate: Stopped (04/17/24 1824)  sodium chloride, 30 mL/hr      Medications:  amLODIPine, 10 mg, Oral, Daily  clopidogrel, 75 mg, Oral, Daily  nicotine, 1 patch, Transdermal, Q24H  pantoprazole, 40 mg, Oral, Daily  senna-docusate sodium, 2 tablet, Oral, BID   And  polyethylene glycol, 17 g, Oral, Daily  rosuvastatin, 20 mg, Oral, Daily  sodium chloride, 10 mL, Intravenous, Q12H  sodium chloride, 3 mL, Intravenous, Q12H      I reviewed the patient's medications.    Vital Sign Min/Max for last 24 hours  Temp  Min: 97.4 °F (36.3 °C)  Max: 99.2 °F (37.3 °C)   BP  Min: 60/37  Max: 130/87   Pulse  Min: 71  Max: 118   Resp  Min: 14  Max: 18   SpO2  Min: 72 %  Max:  98 %   No data recorded       Input/Output for last 24 hour shift  04/17 0701 - 04/18 0700  In: 950 [I.V.:750]  Out: 1630 [Urine:1550; Drains:80]   S RR:  [5-11] 5  GENERAL : NAD, conversant  RESPIRATORY/THORAX : normal respiratory effort and no intercostal retractions, CTAB  CARDIOVASCULAR : Normal S1/S2, RRR. no lower ext edema.  GASTROINTESTINAL : Soft, NT/ND. BS x 4 normoactive. No hepatosplenomegaly.  MUSCULOSKELETAL : No cyanosis, clubbing, or ischemia  NEUROLOGICAL: alert and oriented to person, place and time  PSYCHOLOGICAL : Appropriate affect    Results from last 7 days   Lab Units 04/18/24  0420 04/16/24  0929   WBC 10*3/mm3 19.68* 13.37*   HEMOGLOBIN g/dL 13.7 15.5   PLATELETS 10*3/mm3 247 266     Results from last 7 days   Lab Units 04/18/24  0420 04/16/24  0929   SODIUM mmol/L 136 135*   POTASSIUM mmol/L 4.0 4.0   CO2 mmol/L 21.0* 24.0   BUN mg/dL 7* 10   CREATININE mg/dL 0.75 0.83   MAGNESIUM mg/dL 1.6  --    PHOSPHORUS mg/dL 3.6  --    GLUCOSE mg/dL 135* 109*     Estimated Creatinine Clearance: 56.2 mL/min (by C-G formula based on SCr of 0.75 mg/dL).          I reviewed the patient's new clinical results.  I reviewed the patient's new imaging results/reports including actual images and agree with reports.         Assessment & Plan   Impression        Carotid stenosis, asymptomatic, right    Essential hypertension    PAD (peripheral artery disease)    Centrilobular emphysema    Carotid stenosis, right    Tobacco use       Plan        Shannon Chan is a 63 year-old female with carotid disease s/p left CEA (2019), PVD s/p femoral endarterectomy (2022) on Xarelto, ongoing tobacco abuse, CAD s/p stents x 3  (2016), emphysema, HTN, dyslipidemia, ETOH use (21 beers / week), and GERD that presents to [facility] ICU postoperatively from scheduled right CEA with Dr. Quinn on 4/18/2024.    -Postop orders per surgery  -Continue home antihypertensive medications  -statin/Plavix  -Ensure adequate pain control  -Mobilize  patient per protocol  -Aggressive pulmonary toilet  -SCDs for DVT prophylaxis  -Medically okay to be downgraded to the floor or discharged    I conducted multidisciplinary rounds in the plan of care was discussed with the multidisciplinary team at that time. In attendance at multidisciplinary rounds was clinical pharmacist, dietitian, nursing staff, and case management.    I discussed the patient's findings and my recommendations with patient and nursing staff       Marcellus Rockwell DO  Pulmonary, Critical care and Sleep Medicine         Electronically signed by Robert Rockwell DO at 04/18/24 0957       Maryanne Roldan APRN at 04/18/24 0705          [facility] Cardiothoracic Surgery In-Patient Progress Note     POD # 1 s/p right CEA     LOS: 1 day       Subjective  No complaints. Off of cardene.      Objective  Vital Signs  Temp:  [97.4 °F (36.3 °C)-99.2 °F (37.3 °C)] 98.2 °F (36.8 °C)  Heart Rate:  [] 93  Resp:  [14-18] 18  BP: ()/(26-90) 93/74        Physical Exam:   General Appearance: alert, appears stated age and cooperative   Neuro: Tongue midline, voice normal   Lungs: clear to auscultation, respirations regular, respirations even, and respirations unlabored   Heart: regular rhythm & normal rate, normal S1, S2, no murmur, no gallop, no rub, and no click   Skin: Incision c/d/I  Output by Drain (mL) 04/17/24 0701 - 04/17/24 1900 04/17/24 1901 - 04/18/24 0700 04/18/24 0701 - 04/18/24 0729 Range Total   Closed/Suction Drain Right;Ventral Neck Bulb 15 Fr. 10 70  80        Results     Results from last 7 days   Lab Units 04/18/24  0420   WBC 10*3/mm3 19.68*   HEMOGLOBIN g/dL 13.7   HEMATOCRIT % 39.6   PLATELETS 10*3/mm3 247     Results from last 7 days   Lab Units 04/18/24  0420   SODIUM mmol/L 136   POTASSIUM mmol/L 4.0   CHLORIDE mmol/L 99   CO2 mmol/L 21.0*   BUN mg/dL 7*   CREATININE mg/dL 0.75   GLUCOSE mg/dL 135*   CALCIUM mg/dL 8.8     Assessment  POD # 1 s/p right CEA      Plan    D/C SHAZIA drain and art line  D/C home today if ambulating well  Restart home Xarelto on Sunday 4/21      DENIS Reddy  04/18/24  07:28 EDT    Electronically signed by Maryanne Roldan APRN at 04/18/24 0859       Kiah Ryan APRN at 04/17/24 1022            Intensive Care Follow-up     Hospital:  LOS: 0 days   Ms. Shannon Chan, 63 y.o. female is followed for:   Carotid stenosis, asymptomatic, right     Subjective     Subjective   Interval History:  Chart reviewed. Encountered patient upon arrival to the ICU. Patient is complaining of some mild incisional pain along with pain at arterial line in right radial. Denies nausea, vomiting, diarrhea, fever, chills. Preoperative labs showing some mildly elevated WBCs. Not requiring vasoactive drips. Hemodynamically stable with adequate oxygenation on room air     The patient's past medical, surgical and social history were reviewed and updated in Epic as appropriate.    Objective   Objective     Infusions:  lactated ringers, 9 mL/hr, Last Rate: 9 mL/hr (04/17/24 1413)  niCARdipine, 5-15 mg/hr  niCARdipine, 5-15 mg/hr  phenylephrine, 0.5-3 mcg/kg/min  sodium chloride, 30 mL/hr      Medications:  ceFAZolin, 2,000 mg, Intravenous, Q8H  [START ON 4/18/2024] clopidogrel, 75 mg, Oral, Daily  famotidine, 20 mg, Intravenous, Q12H   Or  famotidine, 20 mg, Oral, Q12H  lidocaine, 4 mL, Nebulization, TID - RT  senna-docusate sodium, 2 tablet, Oral, BID   And  polyethylene glycol, 17 g, Oral, Daily  sodium chloride, 10 mL, Intravenous, Q12H      I reviewed the patient's medications.    Vital Sign Min/Max for last 24 hours  Temp  Min: 97.4 °F (36.3 °C)  Max: 99.2 °F (37.3 °C)   BP  Min: 76/60  Max: 125/90   Pulse  Min: 78  Max: 99   Resp  Min: 16  Max: 16   SpO2  Min: 92 %  Max: 93 %   No data recorded       Input/Output for last 24 hour shift  No intake/output data recorded.   S RR:  [5-11] 5    Physical Exam:  GENERAL: Patient resting in bed. NAD.    HEENT:  Normocephalic and atraumatic. Trachea midline. PER. EOM WNL. R neck incision C/D/I, KATYA. SHAZIA drain intact with bloody drainage.    LUNGS: Chest rise of normal depth and symmetric. Lungs with wheezing bilaterally.    HEART: S1,S2 detected. Regular rate and rhythm. No rub, murmur, or gallop.   ABDOMEN: Soft, round, nondistended, and nontender. Bowel sounds present.   EXTREMITIES: No clubbing, edema, or cyanosis. Weak pulses BLE. Skin warm and dry. Right radial A-line dressing C/D/I with good waveform   NEURO/PSYCH: Alert and oriented. Follows commands. Moves all extremities. No focal deficits.      Results from last 7 days   Lab Units 04/16/24  0929   WBC 10*3/mm3 13.37*   HEMOGLOBIN g/dL 15.5   PLATELETS 10*3/mm3 266     Results from last 7 days   Lab Units 04/16/24  0929   SODIUM mmol/L 135*   POTASSIUM mmol/L 4.0   CO2 mmol/L 24.0   BUN mg/dL 10   CREATININE mg/dL 0.83   GLUCOSE mg/dL 109*     Estimated Creatinine Clearance: 50.8 mL/min (by C-G formula based on SCr of 0.83 mg/dL).          I reviewed the patient's new clinical results.  I reviewed the patient's new imaging results/reports including actual images and agree with reports.     Imaging Results (Last 24 Hours)       ** No results found for the last 24 hours. **            Assessment & Plan   Impression      Carotid stenosis, asymptomatic, right    Carotid stenosis, right    Shannon Chan is a 63 year-old female with carotid disease s/p left CEA (2019), PVD s/p femoral endarterectomy (2022) on Xarelto, ongoing tobacco abuse, CAD s/p stents x 3  (2016), emphysema, HTN, dyslipidemia, ETOH use (21 beers / week), and GERD that presents to [facility] ICU postoperatively from scheduled right CEA with Dr. Quinn today.     Patient was referred back to Dr. Quinn after CTA demonstrated 80-85% stenosis of right carotid stenosis and he recommended surgical intervention. Patient denies symptoms. Last dose of Xarelto 4/15/24. Of note, following right femoral endarterectomy  in 2022, patient experienced wound dehiscence with subsequent E. Coli infection in the wound bed requiring inpatient treatment with IV antibiotics directed by ID.      Plan      Admit to ICU  Postop orders per surgery  Nicardipine for blood pressure control per protocol  Neurovascular checks per protocol  Aspirin/statin/Plavix  Ensure adequate pain control  Mobilize patient per protocol  Aggressive pulmonary toilet  Nicotine patch. Patient unwilling to quit smoking per my conversation with her.   PRN duo-nebs for emphysema  Reorder home medications as appropriate  SCDs for DVT prophylaxis  AM labs. Trend WBCs. No signs or symptoms of infection currently.       Time spent: 39 minutes  Plan of care and goals reviewed with multidisciplinary/antibiotic stewardship team during rounds.   I discussed the patient's findings and my recommendations with patient and nursing staff     Kiah Ryan, MSN, APRN, [facility]  Pulmonary and Critical Care Medicine             Electronically signed by Kiah Ryan APRN at 04/17/24 1734       Consult Notes (last 24 hours)           Dallin Quinn MD   Physician  Cardiothoracic Surgery     Op Note     Signed     Date of Service: 04/17/24 1425  Creation Time: 04/17/24 1518  Case Time: Procedures: Surgeons:   04/17/24 1425 CAROTID ENDARTERECTOMY WITH EEG RIGHT    Dallin Quinn MD               Signed         Show:  []Written[]Templated[]Copied    Added by:  [x]Dallin Quinn MD    []Arun for details  Operative Report     Preop Diagnosis: Previous femoral endarterectomy.  Previous left carotid endarterectomy.  Now with significant 80% right carotid stenosis        Postoperative Diagnosis: Same        Procedure: Right carotid endarterectomy with pericardial patch closure and EEG monitoring           Surgeons: Dallin Quinn MD        Assistant: Marysol Sotelo PA-C     The Assistant provided services of suctioning, irrigation and retraction.  Also, assisted in  suture closure of parts of the skin incision.        Indication: This is a patient fairly well-known to me on whom I performed previous left-sided carotid endarterectomy.  I also performed a previous femoral endarterectomy.  She was now referred to me back specifically to see right-sided carotid artery stenosis.  She understood that surgery carries with the risk of stroke bleeding infection death and agreed to proceed           Description: Supine position.  Sterile prep and drape.  Antibiotics given.  General endotracheal anesthesia.  An incision made on the right neck anterior to the sternocleidomastoid and through the platysma.  Care was taken to identify not injure the hypoglossal and vagus nerves.  Heparin was given and then the internal, external, and common carotid arteries were sequentially clamped with no deleterious EEG changes encountered.  The artery was opened with a heavy calcific plaque which was endarterectomized to a smooth surface.  Brisk backbleeding was noted from the internal carotid.  A pericardial patch was sutured over the arteriotomy with 6-0 Prolene suture and the graft was flushed free of air and debris.  The clamps were released in the proper sequential fashion and again no deleterious EEG changes were encountered.  Protamine was given to reverse the heparin the incision was irrigated with antibiotic solution and a small Fawad-Scott drain placed the incision closed with multiple layers of suture and the sponge and needle count reported as correct estimated blood loss less than 75 mL there was no apparent early complications        Please note that portions of this note were completed with a voice recognition program. Efforts were made to edit the dictations, but occasionally words are mistranscribed.                Notes from 04/17/24 0955 through 04/18/24 0955   No notes of this type exist for this encounter.

## 2024-04-18 NOTE — PROGRESS NOTES
Intensive Care Follow-up     Hospital:  LOS: 1 day   Ms. Shannon Chan, 63 y.o. female is followed for:   Carotid stenosis, asymptomatic, right            History of present illness:   Shannon Chan is a 63 year-old female with carotid disease s/p left CEA (2019), PVD s/p femoral endarterectomy (2022) on Xarelto, ongoing tobacco abuse, CAD s/p stents x 3  (2016), emphysema, HTN, dyslipidemia, ETOH use (21 beers / week), and GERD that presents to [facility] ICU postoperatively from scheduled right CEA with Dr. Quinn today.      Patient was referred back to Dr. Quinn after CTA demonstrated 80-85% stenosis of right carotid stenosis and he recommended surgical intervention. Patient denies symptoms. Last dose of Xarelto 4/15/24. Of note, following right femoral endarterectomy in 2022, patient experienced wound dehiscence with subsequent E. Coli infection in the wound bed requiring inpatient treatment with IV antibiotics directed by ID.       Subjective   Interval History:  Patient doing well this morning, no significant pain.  Would like to go home.             The patient's past medical, surgical and social history were reviewed and updated in Epic as appropriate.       Objective     Infusions:  lactated ringers, 9 mL/hr, Last Rate: 9 mL/hr (04/17/24 1413)  niCARdipine, 5-15 mg/hr, Last Rate: Stopped (04/17/24 1823)  phenylephrine, 0.5-3 mcg/kg/min, Last Rate: Stopped (04/17/24 1824)  sodium chloride, 30 mL/hr      Medications:  amLODIPine, 10 mg, Oral, Daily  clopidogrel, 75 mg, Oral, Daily  nicotine, 1 patch, Transdermal, Q24H  pantoprazole, 40 mg, Oral, Daily  senna-docusate sodium, 2 tablet, Oral, BID   And  polyethylene glycol, 17 g, Oral, Daily  rosuvastatin, 20 mg, Oral, Daily  sodium chloride, 10 mL, Intravenous, Q12H  sodium chloride, 3 mL, Intravenous, Q12H      I reviewed the patient's medications.    Vital Sign Min/Max for last 24 hours  Temp  Min: 97.4 °F (36.3 °C)  Max: 99.2 °F (37.3 °C)   BP  Min: 60/37   Max: 130/87   Pulse  Min: 71  Max: 118   Resp  Min: 14  Max: 18   SpO2  Min: 72 %  Max: 98 %   No data recorded       Input/Output for last 24 hour shift  04/17 0701 - 04/18 0700  In: 950 [I.V.:750]  Out: 1630 [Urine:1550; Drains:80]   S RR:  [5-11] 5  GENERAL : NAD, conversant  RESPIRATORY/THORAX : normal respiratory effort and no intercostal retractions, CTAB  CARDIOVASCULAR : Normal S1/S2, RRR. no lower ext edema.  GASTROINTESTINAL : Soft, NT/ND. BS x 4 normoactive. No hepatosplenomegaly.  MUSCULOSKELETAL : No cyanosis, clubbing, or ischemia  NEUROLOGICAL: alert and oriented to person, place and time  PSYCHOLOGICAL : Appropriate affect    Results from last 7 days   Lab Units 04/18/24  0420 04/16/24  0929   WBC 10*3/mm3 19.68* 13.37*   HEMOGLOBIN g/dL 13.7 15.5   PLATELETS 10*3/mm3 247 266     Results from last 7 days   Lab Units 04/18/24  0420 04/16/24  0929   SODIUM mmol/L 136 135*   POTASSIUM mmol/L 4.0 4.0   CO2 mmol/L 21.0* 24.0   BUN mg/dL 7* 10   CREATININE mg/dL 0.75 0.83   MAGNESIUM mg/dL 1.6  --    PHOSPHORUS mg/dL 3.6  --    GLUCOSE mg/dL 135* 109*     Estimated Creatinine Clearance: 56.2 mL/min (by C-G formula based on SCr of 0.75 mg/dL).          I reviewed the patient's new clinical results.  I reviewed the patient's new imaging results/reports including actual images and agree with reports.         Assessment & Plan   Impression        Carotid stenosis, asymptomatic, right    Essential hypertension    PAD (peripheral artery disease)    Centrilobular emphysema    Carotid stenosis, right    Tobacco use       Plan        Shannon Chan is a 63 year-old female with carotid disease s/p left CEA (2019), PVD s/p femoral endarterectomy (2022) on Xarelto, ongoing tobacco abuse, CAD s/p stents x 3  (2016), emphysema, HTN, dyslipidemia, ETOH use (21 beers / week), and GERD that presents to [facility] ICU postoperatively from scheduled right CEA with Dr. Quinn on 4/18/2024.    -Postop orders per surgery  -Continue  home antihypertensive medications  -statin/Plavix  -Ensure adequate pain control  -Mobilize patient per protocol  -Aggressive pulmonary toilet  -SCDs for DVT prophylaxis  -Medically okay to be downgraded to the floor or discharged    I conducted multidisciplinary rounds in the plan of care was discussed with the multidisciplinary team at that time. In attendance at multidisciplinary rounds was clinical pharmacist, dietitian, nursing staff, and case management.    I discussed the patient's findings and my recommendations with patient and nursing staff       Marcellus Rockwell, DO  Pulmonary, Critical care and Sleep Medicine

## 2024-04-19 ENCOUNTER — PATIENT ROUNDING (BHMG ONLY) (OUTPATIENT)
Dept: CARDIAC SURGERY | Facility: CLINIC | Age: 64
End: 2024-04-19
Payer: MEDICARE

## 2024-04-19 NOTE — PROGRESS NOTES
April 19, 2024    Hello, may I speak with Shannon Chan?    My name is JACEK    I am  with [facility] CARDIOTHORACIC SURGERY  [address]  [place] [facility] [address] [phone].    Before we get started may I verify your date of birth? 1960    I am calling to officially welcome you to our practice and ask about your recent visit. Is this a good time to talk? YES    Tell me about your visit with us. What things went well? EVERYTHING WAS FINE       We're always looking for ways to make our patients' experiences even better. Do you have recommendations on ways we may improve?  NO    Overall were you satisfied with your first visit to our practice? YES       I appreciate you taking the time to speak with me today. Is there anything else I can do for you?   NO    Thank you, and have a great day.

## 2024-04-19 NOTE — OUTREACH NOTE
Prep Survey      Flowsheet Row Responses   Confucianism facility patient discharged from? [facility]   Is LACE score < 7 ? No   Eligibility Readm Mgmt   Discharge diagnosis CAROTID ENDARTERECTOMY   Does the patient have one of the following disease processes/diagnoses(primary or secondary)? General Surgery   Does the patient have Home health ordered? No   Is there a DME ordered? No   Prep survey completed? Yes            THIAGO KC - Registered Nurse

## 2024-04-22 NOTE — DISCHARGE SUMMARY
[facility] Cardiothoracic Surgery  DISCHARGE SUMMARY    Patient Name: Shannon Chan  : 1960  MRN: [id number]    Date of Admission: 2024 10:04 AM  Date of Discharge:  2024  Primary Care Physician: Zachary Shepherd III, MD  Referred By: Dallin Quinn MD    IP Care Team:  Patient Care Team:  Zachary Shepherd III, MD as PCP - General (Family Medicine)    Consults       No orders found from 3/19/2024 to 2024.            Hospital Course     Presenting Problem: Right carotid stenosis    Active Hospital Problems    Diagnosis  POA    **Carotid stenosis, asymptomatic, right [I65.21]  Unknown    Carotid stenosis, right [I65.21]  Yes    Tobacco use [Z72.0]  Unknown    Centrilobular emphysema [J43.2]  Yes    PAD (peripheral artery disease) [I73.9]  Yes    Essential hypertension [I10]  Yes      Resolved Hospital Problems   No resolved problems to display.        Procedures Performed:  Procedure(s):  CAROTID ENDARTERECTOMY WITH EEG RIGHT       Hospital Course:  Shannon Chan is a 63 y.o. female who was admitted to [facility] on 2024 and underwent scheduled right carotid endarterectomy with Dr. Quinn.  She was extubated without complications and was sent to ICU in stable condition.  POD 1: No acute events.  She was weaned off of Cardene drip.  She was discharged home in stable condition.       Discharge Follow Up Recommendations for outpatient labs/diagnostics:  Follow-up with PCP 1 to 2 weeks  Follow-up with CT surgery in 2 to 4 weeks        DO NOT REMOVE SUTURES AND/OR STAPLES THIS WILL BE ADDRESSED AT CT SURGERY FOLLOW-UP  If you have any questions or concerns please reach out to our office at [phone]    Day of Discharge     HPI:   The patient is a 63-year-old female who is well known to me from her previous left carotid endarterectomy performed in 2019 and previous femoral endarterectomy after that.  This time she is referred to me witha CT  "angiogram demonstrating over 80 to 85% right internal carotid artery stenosis.  Despite all of this she has continued smoking.  At this time she denies stroke, seizures or TIAs.  She also denies amaurosis fugax.  I have reviewed her CT angiogram of the carotids and I have notr reviewed any other studies.       Vital Signs:   Temp:  [97.4 °F (36.3 °C)-99.2 °F (37.3 °C)] 98.2 °F (36.8 °C)  Heart Rate:  [] 93  Resp:  [14-18] 18  BP: ()/(26-90) 93/74      Physical Exam:  General Appearance: alert, appears stated age and cooperative              Neuro: Tongue midline, voice normal              Lungs: clear to auscultation, respirations regular, respirations even, and respirations unlabored              Heart: regular rhythm & normal rate, normal S1, S2, no murmur, no gallop, no rub, and no click              Skin: Incision c/d/I    Pertinent  and/or Most Recent Results     LAB RESULTS:    Operative Pathology:    Final Diagnosis   RIGHT CAROTID PLAQUE:  Calcified atheromatous plaque.   Electronically signed by Patric Duval MD on 4/19/2024 at 1235   Gross Description    1. Carotid Plaque.  Received in formalin labeled \"right carotid plaque\" is a 3.5 cm long by 1.2 cm in average diameter bifurcated yellow-brown, densely calcified atheromatous plaque.  Thrombi are not presented representative sections are submitted in block 1A following decalcification.  HDM           Lab 04/18/24 0420 04/16/24  0929   WBC 19.68* 13.37*   HEMOGLOBIN 13.7 15.5   HEMATOCRIT 39.6 46.9*   PLATELETS 247 266   NEUTROS ABS 16.35*  --    IMMATURE GRANS (ABS) 0.10*  --    LYMPHS ABS 2.12  --    MONOS ABS 1.06*  --    EOS ABS 0.00  --    MCV 86.1 88.0   PROTIME  --  12.5         Lab 04/18/24  0420 04/16/24  0929   SODIUM 136 135*   POTASSIUM 4.0 4.0   CHLORIDE 99 97*   CO2 21.0* 24.0   ANION GAP 16.0* 14.0   BUN 7* 10   CREATININE 0.75 0.83   EGFR 90.1 79.8   GLUCOSE 135* 109*   CALCIUM 8.8 10.3   MAGNESIUM 1.6  --    PHOSPHORUS 3.6  " --    HEMOGLOBIN A1C  --  5.40             Lab 04/16/24  0929   PROTIME 12.5   INR 0.92             Lab 04/16/24  0929   ABO TYPING O   RH TYPING Positive   ANTIBODY SCREEN Negative         Brief Urine Lab Results       None          Microbiology Results (last 10 days)       ** No results found for the last 240 hours. **            SLP FEES - Fiberoptic Endo Eval Swallow    Result Date: 4/18/2024  This procedure was auto-finalized with no dictation required.    EEG Monitoring Nonintracranial Surgery    Result Date: 4/17/2024  Reason for referral: 63 y.o.female with right carotid artery stenosis, monitoring during right CEA Technical Summary:  A 19 channel digital EEG was performed using the international 10-20 placement system, including eye leads and EKG leads. Duration: 50 minutes Findings: The patient is receiving IV sedation as the study begins.  EMG artifact is now absent.  The background shows diffuse low amplitude 4-6 Hz intermixed delta and theta activity with an overlay of faster beta frequencies present symmetrically over both hemispheres.  As the study proceeds, frequent brief periods of relative voltage attenuation are noted.  The right carotid artery is clamped at 2:31 PM and the clamp is removed at 2:48 PM.  During the time the clamp is in place and following clamp removal, no changes in the background are seen. Video: None Technical quality: Appear SUMMARY: No lateralizing features are seen during clamping of the right carotid artery     Uneventful EEG monitoring during right CEA This report is transcribed using the Dragon dictation system.      EEG (Pre-Op)    Result Date: 4/17/2024  Reason for referral: 63 y.o.female with right carotid artery stenosis, baseline study prior to monitoring for right CEA Technical Summary:  A 19 channel digital EEG was performed using the international 10-20 placement system, including eye leads and EKG leads. Duration: 20 minutes Findings: The patient is awake.   Diffuse medium amplitude intermixed theta and alpha activity is present symmetrically over both hemispheres.  EMG and eye blink artifact are noted anteriorly.  Sleep is not seen.  Hyperventilation and photic stimulation are not performed.  No focal features or epileptiform activity are seen. Video: None Technical quality: Superior EKG: Regular, 80 bpm SUMMARY: Normal EEG in the awake state      This study is adequate for intraoperative monitoring This report is transcribed using the Dragon dictation system.      Chest X-Ray PA & Lateral    Result Date: 4/16/2024  XR CHEST PA AND LATERAL Date of Exam: 4/16/2024 10:02 AM EDT Indication: Pre-Op Cardiac Surgery Comparison: Chest radiograph 4/28/2022. Findings: Cardiomediastinal silhouette is unchanged. No focal consolidation or overt pulmonary edema. No pleural effusion or pneumothorax. Osseous structures are unchanged, with multiple old/healed bilateral rib fractures again seen.     Impression: No evidence of acute cardiopulmonary disease. Electronically Signed: Acosta Carson MD  4/16/2024 10:24 AM EDT  Workstation ID: [id number]     Results for orders placed in visit on 06/06/23    Duplex Carotid Ultrasound CAR      Results for orders placed in visit on 06/06/23    Duplex Carotid Ultrasound CAR              Discharge Details        Discharge Medications        New Medications        Instructions Start Date   clopidogrel 75 MG tablet  Commonly known as: PLAVIX   75 mg, Oral, Daily      nicotine 21 MG/24HR patch  Commonly known as: NICODERM CQ   1 patch, Transdermal, Every 24 Hours             Changes to Medications        Instructions Start Date   Rivaroxaban 2.5 MG tablet  Commonly known as: XARELTO  What changed: additional instructions   2.5 mg, Oral, 2 Times Daily With Meals, RESUME 4/21             Continue These Medications        Instructions Start Date   amLODIPine 10 MG tablet  Commonly known as: NORVASC   10 mg, Oral, Every Morning      aspirin 81 MG EC  tablet   81 mg, Oral, Daily      pantoprazole 40 MG EC tablet  Commonly known as: PROTONIX   40 mg, Oral, Every Morning      rosuvastatin 20 MG tablet  Commonly known as: CRESTOR   20 mg, Oral, Every Morning               Allergies   Allergen Reactions    Percocet [Oxycodone-Acetaminophen] Nausea And Vomiting     N/V    Lortab [Hydrocodone-Acetaminophen] Nausea And Vomiting    Darvon [Propoxyphene] Nausea And Vomiting     Darvocet         Discharge Disposition:  Home or Self Care    Diet:  Hospital:No active diet order      Activity:  Activity Instructions       Activity as Tolerated      Bathing Restrictions      You may shower    Type of Restriction: Bathing    Bathing Restrictions: No Tub Bath    Driving Restrictions      Type of Restriction: Driving    Driving Restrictions: No Driving While Taking Narcotics    Lifting Restrictions      Type of Restriction: Lifting    Lifting Restrictions: Lifting Restriction (Indicate Limit)    Weight Limit (Pounds): 10    Length of Lifting Restriction: until seen at next follow-up appointment                 CODE STATUS:    Code Status and Medical Interventions:   Ordered at: 04/17/24 1531     Code Status (Patient has no pulse and is not breathing):    CPR (Attempt to Resuscitate)     Medical Interventions (Patient has pulse or is breathing):    Full Support       Future Appointments   Date Time Provider Department Center   5/16/2024 11:30 AM Misty Henao APRN [facility]       Additional Instructions for the Follow-ups that You Need to Schedule       Call MD With Problems / Concerns   As directed      Instructions:   Please call the CT Surgery office with any questions or concerns you may have [phone]    Order Comments: Instructions: Please call the CT Surgery office with any questions or concerns you may have [phone]         Discharge Follow-up with PCP   As directed       Currently Documented PCP:    Zachary Shepherd III, MD    PCP Phone Number:     [phone]     Follow Up Details: Follow-up with your PCP within 1-2 weeks        Discharge Follow-up with Specialty: Cardiothoracic Surgery   As directed      Specialty: Cardiothoracic Surgery   Follow Up Details: Follow-up within 2-4 weeks                Discharge Education:  Tobacco Cessation:  I advised patient to quit, and offered support.  Wound Care:  Patient educated regarding care of post-operative wounds.  Patient is to wash with plain soap and water and pat dry.  Patient is not to use salves on the incision sites.  Patient instructed regarding the signs and symptoms of infection and when to call the office with any concerns.      Maryanne Roldan, DENIS  04/22/24  08:51 EDT    Time: I spent 15 minutes on this discharge activity which included: face-to-face encounter with the patient, reviewing the data in the system, coordination of the care with the nursing staff as well as consultants, documentation, and entering orders.

## 2024-04-23 ENCOUNTER — READMISSION MANAGEMENT (OUTPATIENT)
Dept: CALL CENTER | Facility: HOSPITAL | Age: 64
End: 2024-04-23
Payer: MEDICARE

## 2024-04-23 NOTE — OUTREACH NOTE
General Surgery Week 1 Survey      Flowsheet Row Responses   [facility] facility patient discharged from? [place]   Does the patient have one of the following disease processes/diagnoses(primary or secondary)? General Surgery   Week 1 attempt successful? No   Unsuccessful attempts Attempt 1            Adeline SORENSEN - Registered Nurse

## 2024-04-24 NOTE — PROGRESS NOTES
"Enter Query Response Below      Query Response: malnutrition  Electronically signed by Dallin Quinn MD, 24, 12:34 PM EDT.              If applicable, please update the problem list.     Patient: Shannon Chan \"Vicky\"        : 1960  Account: [id number]           Admit Date: 2024        How to Respond to this query:       a. Click New Note     b. Answer query within the yellow box.                c. Update the Problem List, if applicable.      If you have any questions about this query contact me at: [email or web address]     Dr. Quinn    63-year-old female admitted 24 for right carotid endarterectomy. BMI 17.9.  malnutrition severity assessment by the registered dietician notes \"eports she vomits everytime she tries to eat solid and she has been \"living on ice cream.\" She has not discussed this with her PCP and she has not had an SLP eval before.  Discussed with RN, plan for SLP order \", \"Patient meets criteria for : Severe Malnutrition Comments: severe muscle and fat wasting on exam due to chronic condition.\" Treatment:  I&O, PO intake, Supplement intake, Pertinent labs, Swallow function, \"Complains of inability to swallow solid food, dicussed with RN.  Might needs swallow eval prior to D/C \"    Please clarify diagnosis treated/monitored:  Chronic illness related severe protein calorie malnutrition  Malnutrition  Underweight  Other- specify __________  Unable to determine     By submitting this query, we are merely seeking further clarification of documentation to accurately reflect all conditions that you are monitoring, evaluating, treating or that extend the hospitalization or utilize additional resources of care. Please utilize your independent clinical judgment when addressing the question(s) above.     This query and your response, once completed, will be entered into the legal medical record.    Sincerely,  Sharda Melvin MSN, RN, CCDS  Clinical Documentation Integrity " Program

## 2024-04-26 ENCOUNTER — READMISSION MANAGEMENT (OUTPATIENT)
Dept: CALL CENTER | Facility: HOSPITAL | Age: 64
End: 2024-04-26
Payer: MEDICARE

## 2024-04-26 ENCOUNTER — TELEPHONE (OUTPATIENT)
Dept: CARDIAC SURGERY | Facility: CLINIC | Age: 64
End: 2024-04-26
Payer: MEDICARE

## 2024-04-26 NOTE — OUTREACH NOTE
General Surgery Week 1 Survey      Flowsheet Row Responses   [facility] patient discharged from? [place]   Does the patient have one of the following disease processes/diagnoses(primary or secondary)? General Surgery   Week 1 attempt successful? No   Unsuccessful attempts Attempt 2            Vargas KC - Registered Nurse

## 2024-04-26 NOTE — TELEPHONE ENCOUNTER
Patient called to report that she was having some clear/bloody drainage around the drain area of her carotid incision. She has some swelling but there is no redness and it does not hot to the touch. She reports that the drainage has no odor and she does not have a fever. Per DENIS Bhandari I told patient that some drainage from that area was ok but she could come into the office today to be evaluated. She declined an office visit today. I told her to watch the area closely and call back Monday if there were any changes and we would get her in to be seen. I advised her to go to the ER over the weekend if she had any concerns.

## 2024-04-30 ENCOUNTER — READMISSION MANAGEMENT (OUTPATIENT)
Dept: CALL CENTER | Facility: HOSPITAL | Age: 64
End: 2024-04-30
Payer: MEDICARE

## 2024-04-30 NOTE — OUTREACH NOTE
General Surgery Week 1 Survey      Flowsheet Row Responses   [facility] patient discharged from? [place]   Does the patient have one of the following disease processes/diagnoses(primary or secondary)? General Surgery   Week 1 attempt successful? Yes   Call start time 1600   Call end time 1601   Discharge diagnosis CAROTID ENDARTERECTOMY   Meds reviewed with patient/caregiver? Yes   Is the patient having any side effects they believe may be caused by any medication additions or changes? No   Does the patient have all medications related to this admission filled (includes all antibiotics, pain medications, etc.) Yes   Is the patient taking all medications as directed (includes completed medication regime)? Yes   Does the patient have a follow up appointment scheduled with their surgeon? Yes  [5/16/24 CT surgery FU apt]   Has the patient kept scheduled appointments due by today? N/A   Has home health visited the patient within 72 hours of discharge? N/A   Psychosocial issues? No   Did the patient receive a copy of their discharge instructions? Yes   Nursing interventions Reviewed instructions with patient   What is the patient's perception of their health status since discharge? Same   Nursing interventions Nurse provided patient education   Is the patient /caregiver able to teach back basic post-op care? Drive as instructed by MD in discharge instructions, Take showers only when approved by MD-sponge bathe until then, No tub bath, swimming, or hot tub until instructed by MD, Keep incision areas clean,dry and protected, Lifting as instructed by MD in discharge instructions, Continue use of incentive spirometry at least 1 week post discharge   Is the patient/caregiver able to teach back signs and symptoms of incisional infection? Increased redness, swelling or pain at the incisonal site, Increased drainage or bleeding, Incisional warmth, Pus or odor from incision, Fever   Is the patient/caregiver able to teach  back steps to recovery at home? Set small, achievable goals for return to baseline health, Rest and rebuild strength, gradually increase activity, Practice good oral hygiene, Eat a well-balance diet, Make a list of questions for surgeon's appointment   If the patient is a current smoker, are they able to teach back resources for cessation? [id number], Smoking cessation medications   Is the patient/caregiver able to teach back the hierarchy of who to call/visit for symptoms/problems? PCP, Specialist, Home health nurse, Urgent Care, ED, 911 Yes   Week 1 call completed? Yes   Call end time 1601            Betzy H - Registered Nurse

## 2024-05-09 ENCOUNTER — READMISSION MANAGEMENT (OUTPATIENT)
Dept: CALL CENTER | Facility: HOSPITAL | Age: 64
End: 2024-05-09
Payer: MEDICARE

## 2024-05-15 ENCOUNTER — READMISSION MANAGEMENT (OUTPATIENT)
Dept: CALL CENTER | Facility: HOSPITAL | Age: 64
End: 2024-05-15
Payer: MEDICARE

## 2024-05-15 NOTE — OUTREACH NOTE
General Surgery Week 2 Survey      Flowsheet Row Responses   [facility] facility patient discharged from? [place]   Does the patient have one of the following disease processes/diagnoses(primary or secondary)? General Surgery   Week 2 attempt successful? No   Unsuccessful attempts Attempt 2            Madelyn SORENSEN - Licensed Nurse

## 2024-05-23 ENCOUNTER — READMISSION MANAGEMENT (OUTPATIENT)
Dept: CALL CENTER | Facility: HOSPITAL | Age: 64
End: 2024-05-23
Payer: MEDICARE

## 2024-05-23 NOTE — OUTREACH NOTE
"General Surgery Week 3 Survey      Flowsheet Row Responses   [facility] patient discharged from? [place]   Does the patient have one of the following disease processes/diagnoses(primary or secondary)? General Surgery   Week 3 attempt successful? Yes   Call start time 1619   Call end time 1621   Discharge diagnosis CAROTID ENDARTERECTOMY   Meds reviewed with patient/caregiver? Yes   Is the patient taking all medications as directed (includes completed medication regime)? Yes   Does the patient have a follow up appointment scheduled with their surgeon? No   What is preventing the patient from scheduling follow up appointments? Haven't had time   Nursing Interventions Advised patient to make appointment   Has the patient kept scheduled appointments due by today? N/A   What is the patient's perception of their health status since discharge? Improving   Week 3 call completed? Yes   Graduated Yes   Graduated/Revoked comments Pt states her area has \"almost healed\" -encouraged pt to reschedule the FU apt with her surgeon's office   Call end time 1621            Betzy CASON - Registered Nurse  "

## 2025-03-31 ENCOUNTER — TELEPHONE (OUTPATIENT)
Dept: CARDIAC SURGERY | Facility: CLINIC | Age: 65
End: 2025-03-31
Payer: MEDICARE

## 2025-03-31 NOTE — TELEPHONE ENCOUNTER
S/w Christelle from Dr. Jacques Miguel with [facility] Hemo/ ONC, per Dr. Quinn patient needs to be seen referred to [facility] due to that we do not treat for ESOPHAGEAL cancer on lung, and Dr. Quinn thinks its best that the patient sees a provider that can treat both at the same time. Christelle v/u and will let provider be aware. I will now cx referral

## (undated) DEVICE — CATH GUIDE SOFTVU FLUSH HT PIG .035 4F 65CM

## (undated) DEVICE — ANTIBACTERIAL UNDYED BRAIDED (POLYGLACTIN 910), SYNTHETIC ABSORBABLE SUTURE: Brand: COATED VICRYL

## (undated) DEVICE — SUCTION CANISTER 2500CC: Brand: DEROYAL

## (undated) DEVICE — PATIENT RETURN ELECTRODE, SINGLE-USE, CONTACT QUALITY MONITORING, ADULT, WITH 9FT CORD, FOR PATIENTS WEIGING OVER 33LBS. (15KG): Brand: MEGADYNE

## (undated) DEVICE — SUT SILK 4/0 TIES 18IN A183H

## (undated) DEVICE — SYR TB SLPTP 1CC NO NDL

## (undated) DEVICE — CATHETER,URETHRAL,REDRUBBER,STRL,18FR: Brand: MEDLINE

## (undated) DEVICE — NDL HYPO ECLPS SFTY 22G 1 1/2IN

## (undated) DEVICE — GLV SURG BIOGEL LTX PF 7 1/2

## (undated) DEVICE — 3M™ IOBAN™ 2 ANTIMICROBIAL INCISE DRAPE 6650EZ: Brand: IOBAN™ 2

## (undated) DEVICE — PK VASC 10

## (undated) DEVICE — SUT SILK 3/0 TIES 18IN A184H

## (undated) DEVICE — DECANT BG O JET

## (undated) DEVICE — GLIDEX™ COATED HYDROPHILIC GUIDEWIRE: Brand: MAGIC TORQUE™

## (undated) DEVICE — PENCL ROCKRSWCH MEGADYNE W/HOLSTR 10FT SS

## (undated) DEVICE — SUT PROLN 6/0 C1 D/A 30IN 8706H

## (undated) DEVICE — SEALANT HEMO TACHOSIL FIBRIN PTCH 9.5X4.8CM

## (undated) DEVICE — SPNG GZ WOVN 4X4IN 12PLY 10/BX STRL

## (undated) DEVICE — TBG INJ CONTRL EXCITE RA 1200PSI 48IN

## (undated) DEVICE — SYR CONTRL PRESS/LO FIX/M/LL W/THMB/RNG 10ML

## (undated) DEVICE — FOGARTY - HYDRAGRIP SURGICAL - CLAMP INSERTS: Brand: FOGARTY SOFTJAW

## (undated) DEVICE — ELECTRD BLD EZ CLN STD 2.5IN

## (undated) DEVICE — AMD ANTIMICROBIAL GAUZE SPONGES,12 PLY USP TYPE VII, 0.2% POLYHEXAMETHYLENE BIGUANIDE HCI (PHMB): Brand: CURITY

## (undated) DEVICE — ADHS SKIN PREMIERPRO EXOFIN TOPICAL HI/VISC .5ML

## (undated) DEVICE — AVANTI + 4F STD W/GW: Brand: AVANTI

## (undated) DEVICE — STERILE PVP: Brand: MEDLINE INDUSTRIES, INC.

## (undated) DEVICE — GLV SURG BIOGEL LTX PF 8

## (undated) DEVICE — SUT PROLN 7/0 BV1 D/A 24IN 8702H

## (undated) DEVICE — SUT SILK 2/0 PS 18IN 1588H

## (undated) DEVICE — CVR HNDL LT SURG ACCSSRY BLU STRL

## (undated) DEVICE — Device: Brand: JELCO

## (undated) DEVICE — TBG PENCL TELESCP MEGADYNE SMOKE EVAC 10FT

## (undated) DEVICE — TRAP FLD MINIVAC MEGADYNE 100ML

## (undated) DEVICE — NDL HYPO SFTY PROEDGE 27G 1 1/4IN GRY

## (undated) DEVICE — HYPODERMIC SAFETY NEEDLE: Brand: MONOJECT

## (undated) DEVICE — NDL PERC 1PRT THNWALL W/BASEPLT 18G 7CM

## (undated) DEVICE — SKIN AFFIX SURG ADHESIVE 72/CS 0.55ML: Brand: MEDLINE

## (undated) DEVICE — COVADERM: Brand: DEROYAL

## (undated) DEVICE — SYR CONTRL LUERLOK 10CC

## (undated) DEVICE — 3M™ STERI-DRAPE™ INSTRUMENT POUCH 1018: Brand: STERI-DRAPE™

## (undated) DEVICE — SUT SILK 2/0 TIES 18IN A185H

## (undated) DEVICE — SYR LUERLOK 30CC

## (undated) DEVICE — HDRST POSTIN FM CRDL TRACH SLOT 9X8X4IN

## (undated) DEVICE — DECANTER BAG 9": Brand: MEDLINE INDUSTRIES, INC.

## (undated) DEVICE — SUT PROLN SURGILENE 5.0 24IN BLU 9702H

## (undated) DEVICE — DRAIN JACKSON PRATT ROUND 15FR: Brand: CARDINAL HEALTH

## (undated) DEVICE — DRN PENRS 1/4X18IN LTX

## (undated) DEVICE — JACKSON-PRATT 100CC BULB RESERVOIR: Brand: CARDINAL HEALTH

## (undated) DEVICE — RADIFOCUS GLIDEWIRE ADVANTAGE GUIDEWIRE: Brand: GLIDEWIRE ADVANTAGE

## (undated) DEVICE — SUCTION CANISTER, 2500CC, RIGID: Brand: DEROYAL

## (undated) DEVICE — PK ANGIO OR 10

## (undated) DEVICE — 1 ML TUBERCULIN SYRINGE REGULAR TIP: Brand: MONOJECT

## (undated) DEVICE — JP 19FR SILICONE DRAIN: Brand: CARDINAL HEALTH

## (undated) DEVICE — LN INJ CONTRST FLXCIL HP BR F/M LL W/ADAPT/ROT 1200PSI 48IN

## (undated) DEVICE — PCH INST SURG INVISISHIELD 2PCKT